# Patient Record
Sex: MALE | Race: WHITE | NOT HISPANIC OR LATINO | Employment: FULL TIME | URBAN - METROPOLITAN AREA
[De-identification: names, ages, dates, MRNs, and addresses within clinical notes are randomized per-mention and may not be internally consistent; named-entity substitution may affect disease eponyms.]

---

## 2017-01-18 ENCOUNTER — TRANSFERRED RECORDS (OUTPATIENT)
Dept: CARDIOLOGY | Facility: CLINIC | Age: 57
End: 2017-01-18

## 2017-06-14 ENCOUNTER — HOSPITAL ENCOUNTER (INPATIENT)
Facility: CLINIC | Age: 57
LOS: 2 days | Discharge: HOME | DRG: 247 | End: 2017-06-16
Attending: EMERGENCY MEDICINE | Admitting: INTERNAL MEDICINE
Payer: COMMERCIAL

## 2017-06-14 ENCOUNTER — APPOINTMENT (OUTPATIENT)
Dept: GENERAL RADIOLOGY | Facility: CLINIC | Age: 57
DRG: 247 | End: 2017-06-14
Attending: EMERGENCY MEDICINE
Payer: COMMERCIAL

## 2017-06-14 DIAGNOSIS — I21.4 NSTEMI (NON-ST ELEVATED MYOCARDIAL INFARCTION) (H): ICD-10-CM

## 2017-06-14 LAB
ALBUMIN SERPL-MCNC: 3.9 G/DL (ref 3.4–5)
ALP SERPL-CCNC: 38 U/L (ref 40–150)
ALT SERPL W P-5'-P-CCNC: 43 U/L (ref 0–70)
ANION GAP SERPL CALCULATED.3IONS-SCNC: 7 MMOL/L (ref 3–14)
AST SERPL W P-5'-P-CCNC: 30 U/L (ref 0–45)
BASOPHILS # BLD AUTO: 0 10E9/L (ref 0–0.2)
BASOPHILS NFR BLD AUTO: 0.3 %
BILIRUB SERPL-MCNC: 0.4 MG/DL (ref 0.2–1.3)
BUN SERPL-MCNC: 19 MG/DL (ref 7–30)
CALCIUM SERPL-MCNC: 9.6 MG/DL (ref 8.5–10.1)
CHLORIDE SERPL-SCNC: 109 MMOL/L (ref 94–109)
CO2 SERPL-SCNC: 25 MMOL/L (ref 20–32)
CREAT SERPL-MCNC: 0.88 MG/DL (ref 0.66–1.25)
D DIMER PPP FEU-MCNC: 0.3 UG/ML FEU (ref 0–0.5)
DIFFERENTIAL METHOD BLD: NORMAL
EOSINOPHIL # BLD AUTO: 0.1 10E9/L (ref 0–0.7)
EOSINOPHIL NFR BLD AUTO: 1.4 %
ERYTHROCYTE [DISTWIDTH] IN BLOOD BY AUTOMATED COUNT: 12.4 % (ref 10–15)
GFR SERPL CREATININE-BSD FRML MDRD: 89 ML/MIN/1.7M2
GLUCOSE SERPL-MCNC: 112 MG/DL (ref 70–99)
HBA1C MFR BLD: 5.8 % (ref 4.3–6)
HCT VFR BLD AUTO: 41.7 % (ref 40–53)
HGB BLD-MCNC: 14.5 G/DL (ref 13.3–17.7)
IMM GRANULOCYTES # BLD: 0 10E9/L (ref 0–0.4)
IMM GRANULOCYTES NFR BLD: 0.2 %
LIPASE SERPL-CCNC: 193 U/L (ref 73–393)
LYMPHOCYTES # BLD AUTO: 2.2 10E9/L (ref 0.8–5.3)
LYMPHOCYTES NFR BLD AUTO: 33.2 %
MAGNESIUM SERPL-MCNC: 1.7 MG/DL (ref 1.6–2.3)
MCH RBC QN AUTO: 30.5 PG (ref 26.5–33)
MCHC RBC AUTO-ENTMCNC: 34.8 G/DL (ref 31.5–36.5)
MCV RBC AUTO: 88 FL (ref 78–100)
MONOCYTES # BLD AUTO: 0.5 10E9/L (ref 0–1.3)
MONOCYTES NFR BLD AUTO: 8.3 %
NEUTROPHILS # BLD AUTO: 3.7 10E9/L (ref 1.6–8.3)
NEUTROPHILS NFR BLD AUTO: 56.6 %
NRBC # BLD AUTO: 0 10*3/UL
NRBC BLD AUTO-RTO: 0 /100
NT-PROBNP SERPL-MCNC: 44 PG/ML (ref 0–900)
PLATELET # BLD AUTO: 231 10E9/L (ref 150–450)
POTASSIUM SERPL-SCNC: 3.7 MMOL/L (ref 3.4–5.3)
PROT SERPL-MCNC: 7.3 G/DL (ref 6.8–8.8)
RBC # BLD AUTO: 4.75 10E12/L (ref 4.4–5.9)
SODIUM SERPL-SCNC: 141 MMOL/L (ref 133–144)
TROPONIN I SERPL-MCNC: 1.48 UG/L (ref 0–0.04)
TROPONIN I SERPL-MCNC: 1.61 UG/L (ref 0–0.04)
WBC # BLD AUTO: 6.5 10E9/L (ref 4–11)

## 2017-06-14 PROCEDURE — 85379 FIBRIN DEGRADATION QUANT: CPT | Performed by: EMERGENCY MEDICINE

## 2017-06-14 PROCEDURE — 84484 ASSAY OF TROPONIN QUANT: CPT | Performed by: EMERGENCY MEDICINE

## 2017-06-14 PROCEDURE — 84484 ASSAY OF TROPONIN QUANT: CPT | Performed by: INTERNAL MEDICINE

## 2017-06-14 PROCEDURE — 25000132 ZZH RX MED GY IP 250 OP 250 PS 637: Performed by: EMERGENCY MEDICINE

## 2017-06-14 PROCEDURE — 25000128 H RX IP 250 OP 636: Performed by: EMERGENCY MEDICINE

## 2017-06-14 PROCEDURE — 36415 COLL VENOUS BLD VENIPUNCTURE: CPT | Performed by: INTERNAL MEDICINE

## 2017-06-14 PROCEDURE — 83735 ASSAY OF MAGNESIUM: CPT | Performed by: EMERGENCY MEDICINE

## 2017-06-14 PROCEDURE — 25000132 ZZH RX MED GY IP 250 OP 250 PS 637: Performed by: INTERNAL MEDICINE

## 2017-06-14 PROCEDURE — 96376 TX/PRO/DX INJ SAME DRUG ADON: CPT

## 2017-06-14 PROCEDURE — 99223 1ST HOSP IP/OBS HIGH 75: CPT | Mod: AI | Performed by: INTERNAL MEDICINE

## 2017-06-14 PROCEDURE — 71020 XR CHEST 2 VW: CPT

## 2017-06-14 PROCEDURE — 83690 ASSAY OF LIPASE: CPT | Performed by: EMERGENCY MEDICINE

## 2017-06-14 PROCEDURE — 83880 ASSAY OF NATRIURETIC PEPTIDE: CPT | Performed by: EMERGENCY MEDICINE

## 2017-06-14 PROCEDURE — 83036 HEMOGLOBIN GLYCOSYLATED A1C: CPT | Performed by: EMERGENCY MEDICINE

## 2017-06-14 PROCEDURE — 96361 HYDRATE IV INFUSION ADD-ON: CPT

## 2017-06-14 PROCEDURE — 12000007 ZZH R&B INTERMEDIATE

## 2017-06-14 PROCEDURE — 96365 THER/PROPH/DIAG IV INF INIT: CPT

## 2017-06-14 PROCEDURE — 99285 EMERGENCY DEPT VISIT HI MDM: CPT | Mod: 25

## 2017-06-14 PROCEDURE — 85025 COMPLETE CBC W/AUTO DIFF WBC: CPT | Performed by: EMERGENCY MEDICINE

## 2017-06-14 PROCEDURE — 93005 ELECTROCARDIOGRAM TRACING: CPT

## 2017-06-14 PROCEDURE — 80053 COMPREHEN METABOLIC PANEL: CPT | Performed by: EMERGENCY MEDICINE

## 2017-06-14 RX ORDER — HYDRALAZINE HYDROCHLORIDE 20 MG/ML
10 INJECTION INTRAMUSCULAR; INTRAVENOUS EVERY 4 HOURS PRN
Status: DISCONTINUED | OUTPATIENT
Start: 2017-06-14 | End: 2017-06-16 | Stop reason: HOSPADM

## 2017-06-14 RX ORDER — BISACODYL 10 MG
10 SUPPOSITORY, RECTAL RECTAL DAILY PRN
Status: DISCONTINUED | OUTPATIENT
Start: 2017-06-14 | End: 2017-06-16 | Stop reason: HOSPADM

## 2017-06-14 RX ORDER — MAGNESIUM SULFATE HEPTAHYDRATE 40 MG/ML
4 INJECTION, SOLUTION INTRAVENOUS EVERY 4 HOURS PRN
Status: DISCONTINUED | OUTPATIENT
Start: 2017-06-14 | End: 2017-06-16 | Stop reason: HOSPADM

## 2017-06-14 RX ORDER — POTASSIUM CL/LIDO/0.9 % NACL 10MEQ/0.1L
10 INTRAVENOUS SOLUTION, PIGGYBACK (ML) INTRAVENOUS
Status: DISCONTINUED | OUTPATIENT
Start: 2017-06-14 | End: 2017-06-16 | Stop reason: HOSPADM

## 2017-06-14 RX ORDER — ONDANSETRON 2 MG/ML
4 INJECTION INTRAMUSCULAR; INTRAVENOUS EVERY 6 HOURS PRN
Status: DISCONTINUED | OUTPATIENT
Start: 2017-06-14 | End: 2017-06-16 | Stop reason: HOSPADM

## 2017-06-14 RX ORDER — NITROGLYCERIN 0.4 MG/1
0.4 TABLET SUBLINGUAL EVERY 5 MIN PRN
Status: DISCONTINUED | OUTPATIENT
Start: 2017-06-14 | End: 2017-06-15

## 2017-06-14 RX ORDER — LISINOPRIL 40 MG/1
40 TABLET ORAL AT BEDTIME
Status: DISCONTINUED | OUTPATIENT
Start: 2017-06-14 | End: 2017-06-16 | Stop reason: HOSPADM

## 2017-06-14 RX ORDER — LORAZEPAM 0.5 MG/1
.5-1 TABLET ORAL EVERY 4 HOURS PRN
Status: DISCONTINUED | OUTPATIENT
Start: 2017-06-14 | End: 2017-06-16 | Stop reason: HOSPADM

## 2017-06-14 RX ORDER — PROCHLORPERAZINE 25 MG
25 SUPPOSITORY, RECTAL RECTAL EVERY 12 HOURS PRN
Status: DISCONTINUED | OUTPATIENT
Start: 2017-06-14 | End: 2017-06-16 | Stop reason: HOSPADM

## 2017-06-14 RX ORDER — CARVEDILOL 6.25 MG/1
6.25 TABLET ORAL ONCE
Status: COMPLETED | OUTPATIENT
Start: 2017-06-14 | End: 2017-06-15

## 2017-06-14 RX ORDER — POTASSIUM CHLORIDE 29.8 MG/ML
20 INJECTION INTRAVENOUS
Status: DISCONTINUED | OUTPATIENT
Start: 2017-06-14 | End: 2017-06-16 | Stop reason: HOSPADM

## 2017-06-14 RX ORDER — LISINOPRIL 20 MG/1
20 TABLET ORAL 2 TIMES DAILY
Status: DISCONTINUED | OUTPATIENT
Start: 2017-06-14 | End: 2017-06-14

## 2017-06-14 RX ORDER — ACETAMINOPHEN 500 MG
1000 TABLET ORAL EVERY 6 HOURS PRN
Status: DISCONTINUED | OUTPATIENT
Start: 2017-06-14 | End: 2017-06-15

## 2017-06-14 RX ORDER — CARVEDILOL 6.25 MG/1
6.25 TABLET ORAL 2 TIMES DAILY WITH MEALS
Status: DISCONTINUED | OUTPATIENT
Start: 2017-06-14 | End: 2017-06-14

## 2017-06-14 RX ORDER — POTASSIUM CHLORIDE 1500 MG/1
20-40 TABLET, EXTENDED RELEASE ORAL
Status: DISCONTINUED | OUTPATIENT
Start: 2017-06-14 | End: 2017-06-16 | Stop reason: HOSPADM

## 2017-06-14 RX ORDER — POTASSIUM CHLORIDE 1.5 G/1.58G
20-40 POWDER, FOR SOLUTION ORAL
Status: DISCONTINUED | OUTPATIENT
Start: 2017-06-14 | End: 2017-06-16 | Stop reason: HOSPADM

## 2017-06-14 RX ORDER — LISINOPRIL 30 MG/1
60 TABLET ORAL AT BEDTIME
Status: ON HOLD | COMMUNITY
End: 2017-06-16

## 2017-06-14 RX ORDER — VENLAFAXINE 37.5 MG/1
37.5 TABLET ORAL 2 TIMES DAILY
Status: DISCONTINUED | OUTPATIENT
Start: 2017-06-14 | End: 2017-06-16 | Stop reason: HOSPADM

## 2017-06-14 RX ORDER — PROCHLORPERAZINE MALEATE 5 MG
5-10 TABLET ORAL EVERY 6 HOURS PRN
Status: DISCONTINUED | OUTPATIENT
Start: 2017-06-14 | End: 2017-06-16 | Stop reason: HOSPADM

## 2017-06-14 RX ORDER — ASPIRIN 325 MG
325 TABLET ORAL ONCE
Status: COMPLETED | OUTPATIENT
Start: 2017-06-14 | End: 2017-06-14

## 2017-06-14 RX ORDER — VENLAFAXINE 37.5 MG/1
37.5 TABLET ORAL 2 TIMES DAILY
COMMUNITY
End: 2024-01-03

## 2017-06-14 RX ORDER — ATORVASTATIN CALCIUM 40 MG/1
40 TABLET, FILM COATED ORAL
Status: DISCONTINUED | OUTPATIENT
Start: 2017-06-14 | End: 2017-06-16 | Stop reason: HOSPADM

## 2017-06-14 RX ORDER — SODIUM CHLORIDE 9 MG/ML
INJECTION, SOLUTION INTRAVENOUS CONTINUOUS
Status: DISCONTINUED | OUTPATIENT
Start: 2017-06-15 | End: 2017-06-16 | Stop reason: HOSPADM

## 2017-06-14 RX ORDER — OMEGA-3 FATTY ACIDS/FISH OIL 300-1000MG
1000 CAPSULE ORAL DAILY
COMMUNITY
End: 2017-08-10

## 2017-06-14 RX ORDER — AMOXICILLIN 250 MG
1-2 CAPSULE ORAL 2 TIMES DAILY PRN
Status: DISCONTINUED | OUTPATIENT
Start: 2017-06-14 | End: 2017-06-16 | Stop reason: HOSPADM

## 2017-06-14 RX ORDER — ONDANSETRON 4 MG/1
4 TABLET, ORALLY DISINTEGRATING ORAL EVERY 6 HOURS PRN
Status: DISCONTINUED | OUTPATIENT
Start: 2017-06-14 | End: 2017-06-16 | Stop reason: HOSPADM

## 2017-06-14 RX ORDER — SODIUM CHLORIDE 9 MG/ML
1000 INJECTION, SOLUTION INTRAVENOUS CONTINUOUS
Status: DISCONTINUED | OUTPATIENT
Start: 2017-06-14 | End: 2017-06-14

## 2017-06-14 RX ORDER — NALOXONE HYDROCHLORIDE 0.4 MG/ML
.1-.4 INJECTION, SOLUTION INTRAMUSCULAR; INTRAVENOUS; SUBCUTANEOUS
Status: DISCONTINUED | OUTPATIENT
Start: 2017-06-14 | End: 2017-06-16

## 2017-06-14 RX ORDER — LISINOPRIL 20 MG/1
20 TABLET ORAL DAILY
Status: DISCONTINUED | OUTPATIENT
Start: 2017-06-15 | End: 2017-06-14

## 2017-06-14 RX ORDER — CARVEDILOL 12.5 MG/1
12.5 TABLET ORAL 2 TIMES DAILY WITH MEALS
Status: DISCONTINUED | OUTPATIENT
Start: 2017-06-15 | End: 2017-06-15

## 2017-06-14 RX ORDER — POTASSIUM CHLORIDE 7.45 MG/ML
10 INJECTION INTRAVENOUS
Status: DISCONTINUED | OUTPATIENT
Start: 2017-06-14 | End: 2017-06-16 | Stop reason: HOSPADM

## 2017-06-14 RX ADMIN — LISINOPRIL 40 MG: 40 TABLET ORAL at 21:53

## 2017-06-14 RX ADMIN — VENLAFAXINE 37.5 MG: 37.5 TABLET ORAL at 21:53

## 2017-06-14 RX ADMIN — ATORVASTATIN CALCIUM 40 MG: 40 TABLET, FILM COATED ORAL at 20:09

## 2017-06-14 RX ADMIN — CARVEDILOL 6.25 MG: 6.25 TABLET, FILM COATED ORAL at 20:09

## 2017-06-14 RX ADMIN — SODIUM CHLORIDE 1000 ML: 9 INJECTION, SOLUTION INTRAVENOUS at 20:50

## 2017-06-14 RX ADMIN — SODIUM CHLORIDE 1000 ML: 9 INJECTION, SOLUTION INTRAVENOUS at 17:45

## 2017-06-14 RX ADMIN — Medication 5250 UNITS: at 18:35

## 2017-06-14 RX ADMIN — NITROGLYCERIN 0.4 MG: 0.4 TABLET SUBLINGUAL at 17:36

## 2017-06-14 RX ADMIN — HEPARIN SODIUM 12 UNITS/KG/HR: 10000 INJECTION, SOLUTION INTRAVENOUS at 18:36

## 2017-06-14 RX ADMIN — ASPIRIN 325 MG ORAL TABLET 325 MG: 325 PILL ORAL at 17:36

## 2017-06-14 RX ADMIN — POTASSIUM CHLORIDE 20 MEQ: 1500 TABLET, EXTENDED RELEASE ORAL at 23:01

## 2017-06-14 ASSESSMENT — ENCOUNTER SYMPTOMS
LIGHT-HEADEDNESS: 0
SHORTNESS OF BREATH: 1
CHEST TIGHTNESS: 1
CONSTIPATION: 0
COUGH: 0
HEMATURIA: 0
NAUSEA: 0
DIARRHEA: 0
PALPITATIONS: 0
VOMITING: 0
DIZZINESS: 0
DIFFICULTY URINATING: 0
FREQUENCY: 0

## 2017-06-14 NOTE — ED NOTES
Pt presents with chest pain, has been going on for about 2 months, Has been getting shortness of breath over the past few months. Now having Substernal chest pain, on exertion. ABC's AXOx4.

## 2017-06-14 NOTE — ED PROVIDER NOTES
History     Chief Complaint:  Chest Pain    HPI   Darryl Uribe is a 57 year old male who presents with chest pain. The patient states the pain is substernal without radiation, and has been intermittent for the last 2 months, since he ran a 5K in April, and doesn't radiate to any other part of the body.  In the past 2-3 days he developed constant chest pressure, 3/10, and shortness of breath, and these three symptoms all worsen with exertion, bringing his pain to a 7-8/10. The chest pressure has been persistent the last few days, and this change is what prompted the patient to call his PCP, who advised him to come to the emergency department for further evaluation. Upon presentation, the patient denies any acute pain, but states the pressure is still felt in his chest. The patient denies nausea, fever, cough, change in bladder and or bowel habits, dizziness, palpatations, leg swelling, and states no other concerns at this time.     Of note, the patient used to workout often before onset of symptoms, but his shortness of breath stopped his activities.     Cardiac/PE/DVT Risk Factors:  The patient has a history of hypertension and high cholesterol and is not a smoker.  He reports a family history of cardiac problems- early-onset MI in both parents.  He denies any personal or familial history of PE, DVT or clotting disorder.  He reports no recent travel, surgery or other immobilizations.  He is not on hormone therapy and has no known malignancy.    Allergies:  No known drug allergies.      Medications:    The patient is currently on no regular medications.      Past Medical History:    History reviewed.  No significant past medical history.     Past Surgical History:    History reviewed. No pertinent past surgical history.     Family History:    History reviewed. No pertinent family history.     Social History:  Marital Status:  Single  Smoking status: Never smoker  Alcohol use: No  Recreational drug use:  Never    Review of Systems   Respiratory: Positive for chest tightness and shortness of breath. Negative for cough.    Cardiovascular: Positive for chest pain. Negative for palpitations and leg swelling.   Gastrointestinal: Negative for constipation, diarrhea, nausea and vomiting.   Genitourinary: Negative for difficulty urinating, frequency, hematuria and urgency.   Neurological: Negative for dizziness and light-headedness.   All other systems reviewed and are negative.      Physical Exam     Patient Vitals for the past 24 hrs:   BP Temp Temp src Pulse Heart Rate Resp SpO2   06/14/17 1711 (!) 150/96 98.2  F (36.8  C) Oral 87 87 18 97 %      Physical Exam  VITAL SIGNS: BP (!) 150/96  Pulse 87  Temp 98.2  F (36.8  C) (Oral)  Resp 18  SpO2 97%   Constitutional:  Well developed, Well nourished.   HENT:  Bilateral external ears normal, Mucous membranes moist, Nose normal. Neck- Normal range of motion, Supple  Respiratory:  Normal breath sounds, No respiratory distress, No wheezing,  Cardiovascular:  Normal heart rate, Normal rhythm, No murmurs, pulses intact and equal bilaterally.  GI:  Bowel sounds normal, Soft, No tenderness,   Musculoskeletal:  Intact distal pulses, No edema, grossly unremarkable range of motion, no calf tenderness  Integument:  Warm, Dry   Neurologic:  Alert, attentive and appropriately oriented  Psychiatric:  Anxious    Emergency Department Course     ECG:  @ 1711  Indication: chest pain   Vent. Rate 83 bpm. RI interval 182 ms. QRS duration 90 ms. QT/QTc 362/425 ms. P-R-T axis 58 -13 -21.   Abnormal ECG.  Normal sinus rhythm, interior infarct, new since 08/13/09  Read @ 1730 by Dr. Perez.     Imaging:  Radiology findings were communicated with the patient who voiced understanding of the findings.  XR Chest 2 vws:  Mistake in not done while in the department, was done on the floor and is unremarkable  Report per radiology      Laboratory:  Laboratory findings were communicated with the patient  who voiced understanding of the findings.  CBC: AWNL. (WBC 6.5, HGB 14.5, )   CMP: Glucose 112 (H), ALKPHOS 38 (L), o/w WNL (Creatinine 0.88)  Lipase: 193  Troponin (Collected 1714): 1614 (H)  Pro-BNP: 44  (1714) D Dimer: 0.3    Interventions:  1736: Nitro 0.4 mg PO  1736: Asprin 325 mg PO  1745: NS 1,000mL IV    1836: Heparin loading bolus 25,000 units in NaCl     Emergency Department Course:  Nursing notes and vitals reviewed.  I performed an exam of the patient as documented above.   IV was inserted and blood was drawn for laboratory testing, results above.  1800: Patient rechecked and updated. Discussed plan to start heparin, and is agreeable to that.  No history of bleeding problems including intracranial or GI hemorrhage.  He is currently pain free after medications.   I discussed the treatment plan with the patient and his partner. They expressed understanding of this plan and consented to admission. I discussed the patient with  Dr. Haro, who will admit the patient to a monitored bed for further evaluation and treatment.    I personally reviewed the laboratory and imaging results with the Patient and answered all related questions prior to admit.      Impression & Plan      Medical Decision Making:  Darryl Uribe is a 57 year old male who presents with chest pain.  His history and risk factor analysis are significant for NSTEMI.  The workup in the Emergency Department (see above for cardiac enzymes and EKG)  is indicative of NSTEMI.  I will admit the patient  to medicine service for further workup.  The patient is pain free after nitroglycerin and aspirin.  After discussing with him the risks and benefits of heparinization and given lack of contraindication to this therapy, heparin bolus and drip were started given suspicion of acute coronary syndrome.      There is no clinical, laboratory, or radiographic evidence of pulmonary embolism, AAA, aortic dissection, pneumonia or pneumothorax.      He agrees to be admitted and all questions were answered.    Diagnosis:    ICD-10-CM    1. NSTEMI (non-ST elevated myocardial infarction) (H) I21.4       Disposition:   Admitted to cardiac telemetry bed under the supervision of Dr. Haro    Scribe Disclosure:  [employer], am serving as a scribe at 5:25 PM on 6/14/2017 to document services personally performed by Arin Perez MD, based on my observations and the provider's statements to me.    6/14/2017   [facility] EMERGENCY DEPARTMENT    Critical care time this patient and agree procedures was 30 minutes       Arin Perez MD  06/14/17 2146

## 2017-06-14 NOTE — IP AVS SNAPSHOT
MRN:[id number]                      After Visit Summary   6/14/2017    Darryl Uribe    MRN: [id number]           Thank you!     Thank you for choosing [facility] for your care. Our goal is always to provide you with excellent care. Hearing back from our patients is one way we can continue to improve our services. Please take a few minutes to complete the written survey that you may receive in the mail after you visit. If you would like to speak to someone directly about your visit please contact Patient Relations at [phone]. Thank you!          Patient Information     Date Of Birth          1960        Designated Caregiver       Most Recent Value    Caregiver    Will someone help with your care after discharge? yes    Name of designated caregiver Severiano    Phone number of caregiver [phone]    Caregiver address [address] [address]      About your hospital stay     You were admitted on:  June 14, 2017 You last received care in the:  [place] [address] [facility]    You were discharged on:  June 16, 2017        Reason for your hospital stay       NSTEMI s/p angiogram                  Who to Call     For medical emergencies, please call 911.  For non-urgent questions about your medical care, please call your primary care provider or clinic, None          Attending Provider     Provider Specialty    Arin Perez MD Emergency Medicine    [facility]       Primary Care Provider Fax #    [facility] [phone]      After Care Instructions     Activity       Your activity upon discharge: activity as tolerated            Diet       Follow this diet upon discharge: Orders Placed This Encounter      Advance Diet as Tolerated: Regular Diet Adult; Low Saturated Fat Na <2400mg Diet                  Follow-up Appointments     Follow-up and recommended labs and tests        Follow up with primary care  provider, [facility], within 7 days   Follow up with cardiology as scheduled  Outpatient cardiac rehab                  Your next 10 appointments already scheduled     Jun 30, 2017  8:10 AM CDT   Return Discharge with ZACHERY Burt CNP   [facility] [place] ([facility])    [address]  [place] [address]   [phone]            Aug 10, 2017  4:45 PM CDT   Return Visit with Mike Tirado MD   [facility] ([facility])    [address]  [place] [address]   [phone]              Further instructions from your care team         Going Home after an Angioplasty or Stent Placement (Cardiac)  ______________________________________________    Patient Name: Darryl Uribe  Date of Procedure: Nuvia 15, 2017    Doctor: Mo    After you go home:    Have an adult stay with you for 24 hours.    Drink plenty of fluids.    You may eat your normal diet, unless your doctor tells you otherwise.    For 24 hours:    Relax and take it easy.    Do NOT smoke.    Do NOT make any important or legal decisions.    Do NOT drive or operate machines at home or at work.    Do NOT drink alcohol.    Remove the Band-Aid after 24 hours. If there is minor oozing, apply another Band-aid and remove it after 12 hours.    For 2 days, do NOT have sex or do any heavy exercise.    Do NOT take a bath, or use a hot tub or pool for at least 3 days. You may shower.        Care of wrist or arm site  It is normal to have soreness at the puncture site and mild tingling in your hand for up to 3 days.    For 2 days, do not use your hand or arm to support your weight (such as rising from a chair) or bend your wrist (such as lifting a garage door).    For 2 days, do not lift more than 5 pounds or exercise your arm (tennis, golf or bowling).    If you start bleeding from the site in your arm:    Sit down and  "press firmly on the site with your fingers for 10 minutes. Call your doctor as soon as you can.    If the bleeding stops, sit still and keep your wrist straight for 2 hours.    Medicines    If you have started taking Plavix or Effient, do not stop taking it until you talk to your heart doctor (cardiologist).      If you have stopped any other medicines, check with your nurse or provider about when to restart them.    Call 911 right away if you have bleeding that is heavy or does not stop.    Call your doctor if:    You have a large or growing hard lump around the site.    The site is red, swollen, hot or tender.    Blood or fluid is draining from the site.    You have chills or a fever greater than 101 F (38 C).    Your leg or arm feels numb or cool.    You have hives, a rash or unusual itching.      [facility] [place]:  [phone] (7 days a week)            Pending Results     No orders found from 6/12/2017 to 6/15/2017.            Statement of Approval     Ordered          06/16/17 1413  I have reviewed and agree with all the recommendations and orders detailed in this document.  EFFECTIVE NOW     Approved and electronically signed by:  Tita Mata MD             Admission Information     Date & Time Provider Department Dept. Phone    6/14/2017 Hai Haro MD [place] [address] [facility] [phone]      Your Vitals Were     Blood Pressure Pulse Temperature Respirations Height Weight    173/82 72 97.9  F (36.6  C) (Oral) 16 1.702 m (5' 7\") 115 kg (253 lb 9.6 oz)    Pulse Oximetry BMI (Body Mass Index)                96% 39.72 kg/m2          MyChart Information     [employer] lets you send messages to your doctor, view your test results, renew your prescriptions, schedule appointments and more. To sign up, go to [email or web address] . Click on \"Log in\" on the left side of the screen, which will take you to the Welcome page. Then click on " "\"Sign up Now\" on the right side of the page.     You will be asked to enter the access code listed below, as well as some personal information. Please follow the directions to create your username and password.     Your access code is: [email or web address]  Expires: 2017  2:26 PM     Your access code will  in 90 days. If you need help or a new code, please call your [place] clinic or [phone].        Care EveryWhere ID     This is your Care EveryWhere ID. This could be used by other organizations to access your [place] medical records  [id number]           Review of your medicines      START taking        Dose / Directions    metoprolol 25 MG tablet   Commonly known as:  LOPRESSOR        Dose:  25 mg   Take 1 tablet (25 mg) by mouth 2 times daily   Quantity:  60 tablet   Refills:  0       ticagrelor 90 MG tablet   Commonly known as:  BRILINTA        Dose:  90 mg   Take 1 tablet (90 mg) by mouth every 12 hours   Quantity:  60 tablet   Refills:  0         CONTINUE these medicines which may have CHANGED, or have new prescriptions. If we are uncertain of the size of tablets/capsules you have at home, strength may be listed as something that might have changed.        Dose / Directions    atorvastatin 40 MG tablet   Commonly known as:  LIPITOR   This may have changed:    - medication strength  - how much to take  - when to take this        Dose:  40 mg   Take 1 tablet (40 mg) by mouth daily   Quantity:  30 tablet   Refills:  0       lisinopril 40 MG tablet   Commonly known as:  PRINIVIL/ZESTRIL   This may have changed:    - medication strength  - how much to take        Dose:  40 mg   Take 1 tablet (40 mg) by mouth At Bedtime   Quantity:  30 tablet   Refills:  0         CONTINUE these medicines which have NOT CHANGED        Dose / Directions    ASPIRIN PO        Dose:  81 mg   Take 81 mg by mouth At Bedtime   Refills:  0       LECITHIN PO        Dose:  1 tablet   Take 1 tablet by mouth daily   Refills:  0       " omega 3 1000 MG Caps        Dose:  1000 mg   Take 1,000 mg by mouth daily   Refills:  0       omeprazole 20 MG CR capsule   Commonly known as:  priLOSEC        Dose:  20 mg   Take 20 mg by mouth 2 times daily   Refills:  0       venlafaxine 37.5 MG tablet   Commonly known as:  EFFEXOR        Dose:  37.5 mg   Take 37.5 mg by mouth 2 times daily   Refills:  0       VITAMIN D (CHOLECALCIFEROL) PO        Dose:  400 Units   Take 400 Units by mouth daily   Refills:  0            Where to get your medicines      These medications were sent to [place], [place] - [address] [address]  [address] [address] [facility] [address]     Phone:  [phone]     atorvastatin 40 MG tablet    lisinopril 40 MG tablet    metoprolol 25 MG tablet    ticagrelor 90 MG tablet                Protect others around you: Learn how to safely use, store and throw away your medicines at www.disposemymeds.org.             Medication List: This is a list of all your medications and when to take them. Check marks below indicate your daily home schedule. Keep this list as a reference.      Medications           Morning Afternoon Evening Bedtime As Needed    ASPIRIN PO   Take 81 mg by mouth At Bedtime   Last time this was given:  81 mg on 6/16/2017  8:52 AM         8am                       atorvastatin 40 MG tablet   Commonly known as:  LIPITOR   Take 1 tablet (40 mg) by mouth daily   Last time this was given:  40 mg on 6/15/2017  7:34 PM         8am                       LECITHIN PO   Take 1 tablet by mouth daily         8am                       lisinopril 40 MG tablet   Commonly known as:  PRINIVIL/ZESTRIL   Take 1 tablet (40 mg) by mouth At Bedtime   Last time this was given:  40 mg on 6/15/2017  9:20 PM                     bedtime           metoprolol 25 MG tablet   Commonly known as:  LOPRESSOR   Take 1 tablet (25 mg) by mouth 2 times daily   Last time this was given:  25 mg on 6/16/2017  8:52 AM         8am         8pm               omega 3 1000 MG Caps   Take 1,000 mg by mouth daily         8am                       omeprazole 20 MG CR capsule   Commonly known as:  priLOSEC   Take 20 mg by mouth 2 times daily   Last time this was given:  20 mg on 6/16/2017  8:52 AM         8am        8pm               ticagrelor 90 MG tablet   Commonly known as:  BRILINTA   Take 1 tablet (90 mg) by mouth every 12 hours   Last time this was given:  90 mg on 6/16/2017  8:52 AM         8am        8pm               venlafaxine 37.5 MG tablet   Commonly known as:  EFFEXOR   Take 37.5 mg by mouth 2 times daily   Last time this was given:  37.5 mg on 6/16/2017  8:52 AM         8am        8pm               VITAMIN D (CHOLECALCIFEROL) PO   Take 400 Units by mouth daily         8am                                 More Information        Discharge Instructions for Heart Attack  You have had a heart attack (acute myocardial infarction). A heart attack occurs when a vessel that sends blood to your heart suddenly becomes blocked. This causes your heart not to work as well as it should. Follow these guidelines for home care and lifestyle changes.  Home care    Take your medicines exactly as directed. Don t skip doses. Talk with your healthcare provider if your medicines aren't working for you. Together you can come up with another treatment plan.    Remember that recovery after a heart attack takes time. Plan to rest for at least 4 to 8 weeks while you recover. Then return to normal activity when your doctor says it s OK.    Ask your doctor about joining a heart rehabilitation program. This can help strengthen your heart and lungs and give you more energy and confidence.    Tell your doctor if you are feeling depressed. Feelings of sadness are common after a heart attack. But it is important to speak to someone or seek counseling if you are feeling overwhelmed by these feelings.    Call 911 right away if you have chest pain or pain that goes to your  shoulder, neck, or back. Don't drive yourself to the hospital.    Ask your family members to learn CPR. This is an important skill that can save lives when it's needed.    Learn to take your own blood pressure and pulse. Keep a record of your results. Ask your doctor when you should seek emergency medical attention. He or she will tell you which blood pressure reading is dangerous.  Lifestyle changes  Your heart attack might have been caused by cardiovascular disease. Your healthcare provider will work with you to make changes to your lifestyle. This will help the heart disease from getting worse. These changes will most likely be a combination of diet and exercise.  Diet  Your healthcare provider will tell you what changes you need to make to your diet. You may need to see a registered dietitian for help with these diet changes. These changes may include:    Cutting back on how much fat and cholesterol you eat    Cutting back on how much salt (sodium) you eat, especially if you have high blood pressure    Eating more fresh vegetables and fruits    Eating lean proteins such as fish, poultry, beans, and peas, and eating less red meat and processed meats    Using low-fat dairy products    Using vegetable and nut oils in limited amounts    Limiting how many sweets and processed foods such as chips, cookies, and baked goods you eat    Limiting how often you eat out. And when you do eat out, making better food choices.    Not eating fried or greasy foods, or foods high in saturated fat  Exercise  Your healthcare provider may tell you to get more exercise if you haven't been physically active. Depending on your case, your provider may recommend that you get moderate to vigorous physical activity for at least 40 minutes each day, and for at least 3 to 4 days each week. A few examples of moderate to vigorous activity include:    Walking at a brisk pace, about 3 to 4 miles per hour    Jogging or running    Swimming or water  aerobics    Hiking    Dancing    Martial arts    Tennis    Riding a bicycle or stationary bike  Other changes  Your healthcare provider may also recommend that you:    Lose weight. If you are overweight or obese, your provider will work with you to lose extra pounds. Making diet changes and getting more exercise can help. A good goal is to lose your 10% of your body weight in one year.    Stop smoking. Sign up for a stop-smoking program to make it more likely for you to quit for good. You can join a stop-smoking support group. Or ask your doctor about nicotine replacement products.    Learn to manage stress. Stress management techniques to help you deal with stress in your home and work life. This will help you feel better emotionally and ease the strain on your heart.  Follow-up  Make a follow-up appointment as directed by our staff.     When to seek medical advice  Call 911 right away if you have:    Chest pain that goes to your neck, jaw, back, or shoulder    Shortness of breath  Otherwise, call your doctor immediately if you have:    Lightheadedness, dizziness, or fainting    Feeling of irregular heartbeat or fast pulse.   Date Last Reviewed: 10/1/2016    8332-0939 [employer]. [address] [place] [address]. All rights reserved. This information is not intended as a substitute for professional medical care. Always follow your healthcare professional's instructions.                Metoprolol tablets  What is this medicine?  METOPROLOL (me TOE proe lole) is a beta-blocker. Beta-blockers reduce the workload on the heart and help it to beat more regularly. This medicine is used to treat high blood pressure and to prevent chest pain. It is also used to after a heart attack and to prevent an additional heart attack from occurring.  How should I use this medicine?  Take this medicine by mouth with a drink of water. Follow the directions on the prescription label. Take this medicine immediately  after meals. Take your doses at regular intervals. Do not take more medicine than directed. Do not stop taking this medicine suddenly. This could lead to serious heart-related effects.  Talk to your pediatrician regarding the use of this medicine in children. Special care may be needed.  What side effects may I notice from receiving this medicine?  Side effects that you should report to your doctor or health care professional as soon as possible:    allergic reactions like skin rash, itching or hives    cold or numb hands or feet    depression    difficulty breathing    faint    fever with sore throat    irregular heartbeat, chest pain    rapid weight gain    swollen legs or ankles  Side effects that usually do not require medical attention (report to your doctor or health care professional if they continue or are bothersome):    anxiety or nervousness    change in sex drive or performance    dry skin    headache    nightmares or trouble sleeping    short term memory loss    stomach upset or diarrhea    unusually tired  What may interact with this medicine?  This medicine may interact with the following medications:    certain medicines for blood pressure, heart disease, irregular heart beat    certain medicines for depression like monoamine oxidase (MAO) inhibitors, fluoxetine, or paroxetine    clonidine    dobutamine    epinephrine    isoproterenol    reserpine  What if I miss a dose?  If you miss a dose, take it as soon as you can. If it is almost time for your next dose, take only that dose. Do not take double or extra doses.  Where should I keep my medicine?  Keep out of the reach of children.  Store at room temperature between 15 and 30 degrees C (59 and 86 degrees F). Throw away any unused medicine after the expiration date.  What should I tell my health care provider before I take this medicine?  They need to know if you have any of these conditions:    diabetes    heart or vessel disease like slow heart  rate, worsening heart failure, heart block, sick sinus syndrome or Raynaud's disease    kidney disease    liver disease    lung or breathing disease, like asthma or emphysema    pheochromocytoma    thyroid disease    an unusual or allergic reaction to metoprolol, other beta-blockers, medicines, foods, dyes, or preservatives    pregnant or trying to get pregnant    breast-feeding  What should I watch for while using this medicine?  Visit your doctor or health care professional for regular check ups. Contact your doctor right away if your symptoms worsen. Check your blood pressure and pulse rate regularly. Ask your health care professional what your blood pressure and pulse rate should be, and when you should contact them.  You may get drowsy or dizzy. Do not drive, use machinery, or do anything that needs mental alertness until you know how this medicine affects you. Do not sit or stand up quickly, especially if you are an older patient. This reduces the risk of dizzy or fainting spells. Contact your doctor if these symptoms continue. Alcohol may interfere with the effect of this medicine. Avoid alcoholic drinks.  NOTE:This sheet is a summary. It may not cover all possible information. If you have questions about this medicine, talk to your doctor, pharmacist, or health care provider. Copyright  2017 Gold Standard                Ticagrelor Oral tablet  What is this medicine?  TICAGRELOR (MAYKEL ka GREL or) helps to prevent blood clots. This medicine is used to prevent heart attack, stroke, or other vascular events in people who have had a recent heart attack or who have severe chest pain.  This medicine may be used for other purposes; ask your health care provider or pharmacist if you have questions.  What should I tell my health care provider before I take this medicine?  They need to know if you have any of these conditions:    bleeding disorder    bleeding in the brain    liver disease    planned surgery    stomach  or intestinal ulcers    stroke or transient ischemic attack    an unusual or allergic reaction to ticagrelor, other medicines, foods, dyes, or preservatives    pregnant or trying to get pregnant    breast-feeding  How should I use this medicine?  Take this medicine by mouth with a glass of water. Follow the directions on the prescription label. You can take it with or without food. If it upsets your stomach, take it with food. Take your medicine at regular intervals. Do not take it more often than directed. Do not stop taking except on your doctor's advice.  Talk to you pediatrician regarding the use of this medicine in children. Special care may be needed.  Overdosage: If you think you've taken too much of this medicine contact a poison control center or emergency room at once.  NOTE: This medicine is only for you. Do not share this medicine with others.  What if I miss a dose?  If you miss a dose, take it as soon as you can. If it is almost time for your next dose, take only that dose. Do not take double or extra doses.  What may interact with this medicine?    certain antibiotics like clarithromycin and telithromycin    certain medicines for fungal infections like itraconazole, ketoconazole, and voriconazole    certain medicines for HIV infection like atazanavir, indinavir, nelfinavir, ritonavir, and saquinavir    certain medicines for seizures like carbamazepine, phenobarbital, and phenytoin    certain medicines that treat or prevent blood clots like warfarin    dexamethasone    digoxin    lovastatin    nefazodone    rifampin    simvastatin  This list may not describe all possible interactions. Give your health care provider a list of all the medicines, herbs, non-prescription drugs, or dietary supplements you use. Also tell them if you smoke, drink alcohol, or use illegal drugs. Some items may interact with your medicine.  What should I watch for while using this medicine?  Visit your doctor or health care  professional for regular check ups. Do not stop taking you medicine unless your doctor tells you to.  Notify your doctor or health care professional and seek emergency treatment if you develop breathing problems; changes in vision; chest pain; severe, sudden headache; pain, swelling, warmth in the leg; trouble speaking; sudden numbness or weakness of the face, arm, or leg. These can be signs that your condition has gotten worse.  If you are going to have surgery or dental work, tell your doctor or health care professional that you are taking this medicine.  You should take aspirin every day with this medicine. Do not take more than 100 mg each day. Talk to your doctor if you have questions.  What side effects may I notice from receiving this medicine?  Side effects that you should report to your doctor or health care professional as soon as possible:    allergic reactions like skin rash, itching or hives, swelling of the face, lips, or tongue    breathing problems    fast or irregular heartbeat    feeling faint or light-headed, falls    signs and symptoms of bleeding such as bloody or black, tarry stools; red or dark-brown urine; spitting up blood or brown material that looks like coffee grounds; red spots on the skin; unusual bruising or bleeding from the eye, gums, or nose  Side effects that usually do not require medical attention (Report these to your doctor or health care professional if they continue or are bothersome.):    breast enlargement in both males and females    diarrhea    dizziness    headache    tiredness    upset stomach  This list may not describe all possible side effects. Call your doctor for medical advice about side effects. You may report side effects to FDA at 7-098-UVW-1488.  Where should I keep my medicine?  Keep out of the reach of children.  Store at room temperature of 59 to 86 degrees F (15 to 30 degrees C). Throw away any unused medicine after the expiration date.  NOTE: This sheet  is a summary. It may not cover all possible information. If you have questions about this medicine, talk to your doctor, pharmacist, or health care provider.  NOTE:This sheet is a summary. It may not cover all possible information. If you have questions about this medicine, talk to your doctor, pharmacist, or health care provider. Copyright  2016 Gold Standard

## 2017-06-14 NOTE — IP AVS SNAPSHOT
"[place] [address] [facility]: [phone]                                              INTERAGENCY TRANSFER FORM - PHYSICIAN ORDERS   2017                    Hospital Admission Date: 2017  ROHITH DALY   : 1960  Sex: Male        Attending Provider: Hai Haro MD     Allergies:  No Known Allergies    Infection:  None   Service:  GENERAL MEDI    Ht:  1.702 m (5' 7\")   Wt:  115 kg (253 lb 9.6 oz)   Admission Wt:  118.4 kg (261 lb 0.4 oz)    BMI:  39.72 kg/m 2   BSA:  2.33 m 2            Patient PCP Information     Provider PCP Type    [facility] General      ED Clinical Impression     Diagnosis Description Comment Added By Time Added    NSTEMI (non-ST elevated myocardial infarction) (H) [I21.4] NSTEMI (non-ST elevated myocardial infarction) (H) [I21.4]  Arin Perez MD 2017  6:13 PM      Hospital Problems as of 2017              Priority Class Noted POA    NSTEMI (non-ST elevated myocardial infarction) (H) Medium  2017 Yes      Non-Hospital Problems as of 2017     None      Code Status History     Date Active Date Inactive Code Status Order ID Comments User Context    2017  9:23 AM 2017  2:11 PM Full Code [id number]  Tita Mata MD Outpatient    2017  7:43 PM 2017  9:23 AM Full Code [id number]  Hai Haro MD Inpatient         Medication Review      START taking        Dose / Directions Comments    metoprolol 25 MG tablet   Commonly known as:  LOPRESSOR        Dose:  25 mg   Take 1 tablet (25 mg) by mouth 2 times daily   Quantity:  60 tablet   Refills:  0        ticagrelor 90 MG tablet   Commonly known as:  BRILINTA        Dose:  90 mg   Take 1 tablet (90 mg) by mouth every 12 hours   Quantity:  60 tablet   Refills:  0          CONTINUE these medications which may have CHANGED, or have new prescriptions. If we are uncertain of the size of tablets/capsules you have at home, strength " may be listed as something that might have changed.        Dose / Directions Comments    atorvastatin 40 MG tablet   Commonly known as:  LIPITOR   This may have changed:    - medication strength  - how much to take  - when to take this        Dose:  40 mg   Take 1 tablet (40 mg) by mouth daily   Quantity:  30 tablet   Refills:  0        lisinopril 40 MG tablet   Commonly known as:  PRINIVIL/ZESTRIL   This may have changed:    - medication strength  - how much to take        Dose:  40 mg   Take 1 tablet (40 mg) by mouth At Bedtime   Quantity:  30 tablet   Refills:  0          CONTINUE these medications which have NOT CHANGED        Dose / Directions Comments    ASPIRIN PO        Dose:  81 mg   Take 81 mg by mouth At Bedtime   Refills:  0        LECITHIN PO        Dose:  1 tablet   Take 1 tablet by mouth daily   Refills:  0        omega 3 1000 MG Caps        Dose:  1000 mg   Take 1,000 mg by mouth daily   Refills:  0        omeprazole 20 MG CR capsule   Commonly known as:  priLOSEC        Dose:  20 mg   Take 20 mg by mouth 2 times daily   Refills:  0        venlafaxine 37.5 MG tablet   Commonly known as:  EFFEXOR        Dose:  37.5 mg   Take 37.5 mg by mouth 2 times daily   Refills:  0        VITAMIN D (CHOLECALCIFEROL) PO        Dose:  400 Units   Take 400 Units by mouth daily   Refills:  0                  Further instructions from your care team         Going Home after an Angioplasty or Stent Placement (Cardiac)  ______________________________________________    Patient Name: Darryl Uribe  Date of Procedure: Nuvia 15, 2017    Doctor: Mo    After you go home:    Have an adult stay with you for 24 hours.    Drink plenty of fluids.    You may eat your normal diet, unless your doctor tells you otherwise.    For 24 hours:    Relax and take it easy.    Do NOT smoke.    Do NOT make any important or legal decisions.    Do NOT drive or operate machines at home or at work.    Do NOT drink alcohol.    Remove the Band-Aid  after 24 hours. If there is minor oozing, apply another Band-aid and remove it after 12 hours.    For 2 days, do NOT have sex or do any heavy exercise.    Do NOT take a bath, or use a hot tub or pool for at least 3 days. You may shower.        Care of wrist or arm site  It is normal to have soreness at the puncture site and mild tingling in your hand for up to 3 days.    For 2 days, do not use your hand or arm to support your weight (such as rising from a chair) or bend your wrist (such as lifting a garage door).    For 2 days, do not lift more than 5 pounds or exercise your arm (tennis, golf or bowling).    If you start bleeding from the site in your arm:    Sit down and press firmly on the site with your fingers for 10 minutes. Call your doctor as soon as you can.    If the bleeding stops, sit still and keep your wrist straight for 2 hours.    Medicines    If you have started taking Plavix or Effient, do not stop taking it until you talk to your heart doctor (cardiologist).      If you have stopped any other medicines, check with your nurse or provider about when to restart them.    Call 911 right away if you have bleeding that is heavy or does not stop.    Call your doctor if:    You have a large or growing hard lump around the site.    The site is red, swollen, hot or tender.    Blood or fluid is draining from the site.    You have chills or a fever greater than 101 F (38 C).    Your leg or arm feels numb or cool.    You have hives, a rash or unusual itching.      [facility] [place]:  [phone] (7 days a week)            Summary of Visit     Reason for your hospital stay       NSTEMI s/p angiogram             After Care     Activity       Your activity upon discharge: activity as tolerated       Diet       Follow this diet upon discharge: Orders Placed This Encounter      Advance Diet as Tolerated: Regular Diet Adult; Low Saturated Fat Na <2400mg Diet             Your next 10  appointments already scheduled     Jun 30, 2017  8:10 AM CDT   Return Discharge with ZACHERY Burt CNP   [facility] [place] ([facility])    [address]  [place] [id number] [phone]            Aug 10, 2017  4:45 PM CDT   Return Visit with Mike Tirado MD   [facility] [place] ([facility])    [address]  [place] [id number] [phone]              Follow-Up Appointment Instructions     Future Labs/Procedures    Follow-up and recommended labs and tests      Comments:    Follow up with primary care provider, [facility], within 7 days   Follow up with cardiology as scheduled      Follow-Up Appointment Instructions     Follow-up and recommended labs and tests        Follow up with primary care provider, [facility], within 7 days   Follow up with cardiology as scheduled             Statement of Approval     Ordered          06/16/17 1413  I have reviewed and agree with all the recommendations and orders detailed in this document.  EFFECTIVE NOW     Approved and electronically signed by:  Tita Mata MD

## 2017-06-14 NOTE — IP AVS SNAPSHOT
[place] [address] [facility]    [address]    [facility] [address]    Phone:  [phone]    Fax:  [phone]                                       After Visit Summary   6/14/2017    Darryl Uribe    MRN: [id number]           After Visit Summary Signature Page     I have received my discharge instructions, and my questions have been answered. I have discussed any challenges I see with this plan with the nurse or doctor.    ..........................................................................................................................................  Patient/Patient Representative Signature      ..........................................................................................................................................  Patient Representative Print Name and Relationship to Patient    ..................................................               ................................................  Date                                            Time    ..........................................................................................................................................  Reviewed by Signature/Title    ...................................................              ..............................................  Date                                                            Time

## 2017-06-14 NOTE — ED NOTES
"[facility]  ED Nurse Handoff Report    Darryl Uribe is a 57 year old male   ED Chief complaint: Chest Pain  . ED Diagnosis:   Final diagnoses:   NSTEMI (non-ST elevated myocardial infarction) (H)     Allergies: No Known Allergies    Code Status: Full Code  Activity level - Baseline/Home:  Independent. Activity Level - Current:   Independent. Lift room needed: No. Bariatric: No   Needed: No   Isolation: No. Infection: Not Applicable.     Vital Signs:   Vitals:    06/14/17 1711   BP: (!) 150/96   Pulse: 87   Resp: 18   Temp: 98.2  F (36.8  C)   TempSrc: Oral   SpO2: 97%   Weight: 118.4 kg (261 lb 0.4 oz)   Height: 1.702 m (5' 7\")       Cardiac Rhythm:  ,   Cardiac  Cardiac Rhythm: Normal sinus rhythm  Pain level:    Patient confused: No. Patient Falls Risk: No.   Elimination Status: Has voided   Patient Report - Initial Complaint: Chest pain. Focused Assessment:  Cardiac - JVD (Jugular Venous Distention): absent General Capillary Refill: less than/equal to 3 secs Cardiac Comment: Chest pain substernal with excertion, rating 7/10 has been short of breath for past 2 months.   Chest Pain Assessment - Rating (0-10): 7 Duration: 2 months  Precipitating Factors: activity Associated Signs/Symptoms: diaphoresis Chest Pain Reproducible?: Yes If yes, how & where is the chest pain reproducible?: When walkinf or going up stairs.   Cardiac Monitoring - EKG Monitoring: Yes Cardiac Regularity: Regular Cardiac Rhythm: NSR  Chest Pain Assessment - Location: midsternal  Cardiac - Cardiac WDL:  WDL except  Tests Performed: labs X-ray. Abnormal Results: Troponin .   Treatments provided: heparin, and asa  Family Comments: Gentleman at beside   OBS brochure/video discussed/provided to patient:  N/A  ED Medications:   Medications   0.9% sodium chloride BOLUS (1,000 mLs Intravenous New Bag 6/14/17 3129)     Followed by   0.9% sodium chloride infusion (not administered)   nitroglycerin (NITROSTAT) sublingual tablet " 0.4 mg (0.4 mg Sublingual Given 6/14/17 1736)   heparin infusion 25,000 units in 0.45% NaCl 250 mL (not administered)   heparin Loading Dose bolus dose from infusion pump 5,250 Units (not administered)   aspirin tablet 325 mg (325 mg Oral Given 6/14/17 1736)     Drips infusing:  Yes heparin          ED Nurse Name/Phone Number: Keegan Rhoades,   6:27 PM    RECEIVING UNIT ED HANDOFF REVIEW    Above ED Nurse Handoff Report was reviewed: Yes  Reviewed by: SULLY KELLER on June 14, 2017 at 7:22 PM

## 2017-06-14 NOTE — H&P
[facility]  Hospitalist Admission Note  Name: Darryl Uribe    MRN: [id number]  YOB: 1960    Age: 57 year old  Date of admission: 6/14/2017  [facility] care provider: [facility]    Chief Complaint:  Chest pain    Darryl Uribe is a 57 year old male with PMH including HTN, anxiety who presents with chest pressure noted to have fairly classic anginal symptoms for over two months which have accelerated in the past three days.  He is now pain free after nitroglycerine.  EKG shows inferior q-waves but no ST elevations or depressions.  Troponin is elevated to 1.614 and he is being admitted for NSTEMI and treated with heparin drip, atorvastatin, aspirin, coreg, lisinopril and close monitoring.  Cardiology will be consulted and he will be NPO at midnight as he likely will need an angiogram while here.    Assessment and Plan:   1. NSTEMI: consider a primary coronary event about two months ago with concerning anginal symptoms intermittently since then, now accelerating which could indicate a new or expanding infarct or alternately complications such as arrhythmias or early CHF symptoms from what I suspect has been ongoing (at least intermittently) ischemia for the past 2+ months.  He is now pain free after nitroglycerine but will continue medical management as noted below and keep him NPO for possible angiogram.  -admit to telemetry  -continue heparin drip  -serial troponins  -check TTE  -Cardiology consult, strong suspicion he'll need an angiogram  -add coreg 6.25 mg BID, atorvastatin 40 mg.  Titrate coreg as needed.  Update.  Still hypertensive to 150s systolic.  Will increase coreg to 12.5 mg BID.  -resume lisinopril.  Will reduce from home 30 mg to 20 mg for now until we see how he responds to the coreg.  -repeat EKG if any chest pain returns.  -monitor on tele  -check A1C, lipid panel    2.   Hypertension: adding coreg 6.25 mg BID, titrate as able.  Continue home lisinopril at  reduced dose of 40 mg QHS compared w/ home 60 mg QHS while titrating coreg.    3.   Anxiety: resume effexor.    4.  Obesity    FEN: NPO at midnight, add low rate IVF in the morning at 0600 to avoid hypovolemia in case cath is planned.    DVT Prophylaxis: heparin drip  Code Status: Full Code  Discharge Dispo: admit to inpatient status      History of Present Illness:  Darryl Uribe is a 57 year old male with PMH including HTN, anxiety who presents with chest pain.  He has been having intermittent chest pain for about two months.  He ran a [employer] in April and notes he actually had chest pressure and dyspnea prior to that which was severe and that the race itself was very difficult for him and he felt he could not catch his breath and had chest pressure and generally a very difficult time recovering.  His chest discomfort has been pressure-like with associated dyspnea, worse with exertion and he denies radiation anywhere or arm symptoms.  He felt this was more persistent the past few days with progressively worsened tolerance for basic physical activity such as going up a flight of stairs so he called his PCP and was sent to the ER today at which point he was noted to still have chest pressure.  He was given a nitroglycerine then his pain completely resolved.  He had an EKG here suggestive of an inferior infarct (q-waves in inferior leads) and troponin which was elevated to 1.614.  D-dimer was negative.  He was given aspirin 325 mg, fluids and started on a heparin drip.        Past Medical History:  Ulcerative colitis  HTN  Anxiety on effexor    Past Surgical History:  Ileostomy surgery 7-8 years ago  Carpal tunnel  rhinoplasty    Social History:  Never smoker.  Rare etoh use, most weeks none.  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  No other  "known hx of CVA or CAD.    Allergies:  No Known Allergies  Medications:  None     Lisinopril 30 mg  Atorvastatin 10 mg   Effexor  ASA 81 mg    Review of Systems:  A Comprehensive greater than 10 system review of systems was carried out.  Pertinent positives and negatives are noted above.  Otherwise negative for contributory information.     Physical Exam:  Blood pressure 152/86, pulse 87, temperature 98.2  F (36.8  C), temperature source Oral, resp. rate 18, height 1.702 m (5' 7\"), weight 118.4 kg (261 lb 0.4 oz), SpO2 97 %.  Wt Readings from Last 1 Encounters:   06/14/17 118.4 kg (261 lb 0.4 oz)     Exam:  General: Alert, awake, no acute distress.  HEENT: NC/AT, eyes anicteric, external occular movements intact, face symmetric.  Dentition WNL, MM moist.  Cardiac: RRR, S1, S2.  No murmurs appreciated.  Pulmonary: Normal chest rise, normal work of breathing.  Lungs CTA BL  Abdomen: soft, non-tender, non-distended.  Bowel Sounds Present.  No guarding.  Extremities: no deformities.  Warm, well perfused.  Skin: no rashes or lesions noted.  Warm and Dry.  Neuro: No focal deficits noted.  Speech clear.  Coordination and strength grossly normal.  Psych: Appropriate affect.    Data:  EKG:  NSR with q-waves in leads II and III.  No ST elevations or depressions noted.  Imaging:  Results for orders placed or performed in visit on 08/14/09   CHEST X-RAY 1 VW    Impression       CHEST 1VIEW PORTABLE PICC      HISTORY:  RN placed PICC. Verify tip placement.      FINDINGS: A PICC line has been advanced from the right brachium. The  catheter tip extends to the low superior vena cava possibly, to the  upper right atrium. The catheter could be pulled back 2 or 3 cm. No  pneumothorax.     Labs:    Recent Labs  Lab 06/14/17  1714   WBC 6.5   HGB 14.5   HCT 41.7   MCV 88             Lab Results   Component Value Date     06/14/2017     08/24/2009     08/23/2009    Lab Results   Component Value Date    CHLORIDE " 109 06/14/2017    CHLORIDE 98 08/24/2009    CHLORIDE 96 08/22/2009    Lab Results   Component Value Date    BUN 19 06/14/2017    BUN 7 08/22/2009    BUN 6 08/20/2009      Lab Results   Component Value Date    POTASSIUM 3.7 06/14/2017    POTASSIUM 3.9 08/24/2009    POTASSIUM 3.5 08/22/2009    Lab Results   Component Value Date    CO2 25 06/14/2017    CO2 30 08/24/2009    CO2 28 08/22/2009    Lab Results   Component Value Date    CR 0.88 06/14/2017    CR 0.66 08/22/2009    CR 0.53 08/20/2009            Jc Haro MD  Hospitalist  [facility]

## 2017-06-15 ENCOUNTER — APPOINTMENT (OUTPATIENT)
Dept: CARDIOLOGY | Facility: CLINIC | Age: 57
DRG: 247 | End: 2017-06-15
Attending: INTERNAL MEDICINE
Payer: COMMERCIAL

## 2017-06-15 LAB
CHOLEST SERPL-MCNC: 148 MG/DL
HDLC SERPL-MCNC: 48 MG/DL
INR PPP: 1.05 (ref 0.86–1.14)
INTERPRETATION ECG - MUSE: NORMAL
KCT BLD-ACNC: 275 SEC (ref 105–167)
LDLC SERPL CALC-MCNC: 58 MG/DL
LMWH PPP CHRO-ACNC: 0.23 IU/ML
LMWH PPP CHRO-ACNC: 0.26 IU/ML
NONHDLC SERPL-MCNC: 100 MG/DL
POTASSIUM SERPL-SCNC: 4.1 MMOL/L (ref 3.4–5.3)
TRIGL SERPL-MCNC: 212 MG/DL
TROPONIN I SERPL-MCNC: 1.28 UG/L (ref 0–0.04)

## 2017-06-15 PROCEDURE — 40000275 ZZH STATISTIC RCP TIME EA 10 MIN

## 2017-06-15 PROCEDURE — C1894 INTRO/SHEATH, NON-LASER: HCPCS

## 2017-06-15 PROCEDURE — C9600 PERC DRUG-EL COR STENT SING: HCPCS | Mod: RC

## 2017-06-15 PROCEDURE — B2111ZZ FLUOROSCOPY OF MULTIPLE CORONARY ARTERIES USING LOW OSMOLAR CONTRAST: ICD-10-PCS | Performed by: INTERNAL MEDICINE

## 2017-06-15 PROCEDURE — 93306 TTE W/DOPPLER COMPLETE: CPT | Mod: 26 | Performed by: INTERNAL MEDICINE

## 2017-06-15 PROCEDURE — C1769 GUIDE WIRE: HCPCS

## 2017-06-15 PROCEDURE — 85347 COAGULATION TIME ACTIVATED: CPT

## 2017-06-15 PROCEDURE — C1874 STENT, COATED/COV W/DEL SYS: HCPCS

## 2017-06-15 PROCEDURE — 25000128 H RX IP 250 OP 636: Performed by: INTERNAL MEDICINE

## 2017-06-15 PROCEDURE — 99233 SBSQ HOSP IP/OBS HIGH 50: CPT | Performed by: INTERNAL MEDICINE

## 2017-06-15 PROCEDURE — 25500064 ZZH RX 255 OP 636: Performed by: INTERNAL MEDICINE

## 2017-06-15 PROCEDURE — 27210759 ZZH DEVICE INFLATION CR6

## 2017-06-15 PROCEDURE — 27210856 ZZH ACCESS HEART CATH CR2

## 2017-06-15 PROCEDURE — 93454 CORONARY ARTERY ANGIO S&I: CPT | Mod: 26 | Performed by: INTERNAL MEDICINE

## 2017-06-15 PROCEDURE — C1725 CATH, TRANSLUMIN NON-LASER: HCPCS

## 2017-06-15 PROCEDURE — 36415 COLL VENOUS BLD VENIPUNCTURE: CPT | Performed by: INTERNAL MEDICINE

## 2017-06-15 PROCEDURE — 027034Z DILATION OF CORONARY ARTERY, ONE ARTERY WITH DRUG-ELUTING INTRALUMINAL DEVICE, PERCUTANEOUS APPROACH: ICD-10-PCS | Performed by: INTERNAL MEDICINE

## 2017-06-15 PROCEDURE — 85520 HEPARIN ASSAY: CPT | Performed by: INTERNAL MEDICINE

## 2017-06-15 PROCEDURE — 92928 PRQ TCAT PLMT NTRAC ST 1 LES: CPT | Mod: RC | Performed by: INTERNAL MEDICINE

## 2017-06-15 PROCEDURE — 99152 MOD SED SAME PHYS/QHP 5/>YRS: CPT

## 2017-06-15 PROCEDURE — 12000007 ZZH R&B INTERMEDIATE

## 2017-06-15 PROCEDURE — 40000264 ECHO COMPLETE WITH LUMASON

## 2017-06-15 PROCEDURE — 27210827 ZZH KIT ACIST INJECTOR CR6

## 2017-06-15 PROCEDURE — 27210946 ZZH KIT HC TOTES DISP CR8

## 2017-06-15 PROCEDURE — 27210742 ZZH CATH CR1

## 2017-06-15 PROCEDURE — 25000125 ZZHC RX 250: Performed by: INTERNAL MEDICINE

## 2017-06-15 PROCEDURE — 93454 CORONARY ARTERY ANGIO S&I: CPT

## 2017-06-15 PROCEDURE — 84132 ASSAY OF SERUM POTASSIUM: CPT | Performed by: INTERNAL MEDICINE

## 2017-06-15 PROCEDURE — 93010 ELECTROCARDIOGRAM REPORT: CPT | Performed by: INTERNAL MEDICINE

## 2017-06-15 PROCEDURE — 99152 MOD SED SAME PHYS/QHP 5/>YRS: CPT | Performed by: INTERNAL MEDICINE

## 2017-06-15 PROCEDURE — 99153 MOD SED SAME PHYS/QHP EA: CPT

## 2017-06-15 PROCEDURE — 25000132 ZZH RX MED GY IP 250 OP 250 PS 637: Performed by: INTERNAL MEDICINE

## 2017-06-15 PROCEDURE — 93005 ELECTROCARDIOGRAM TRACING: CPT

## 2017-06-15 PROCEDURE — C1887 CATHETER, GUIDING: HCPCS

## 2017-06-15 PROCEDURE — 85610 PROTHROMBIN TIME: CPT | Performed by: INTERNAL MEDICINE

## 2017-06-15 PROCEDURE — 80061 LIPID PANEL: CPT | Performed by: INTERNAL MEDICINE

## 2017-06-15 PROCEDURE — 99222 1ST HOSP IP/OBS MODERATE 55: CPT | Mod: 25 | Performed by: INTERNAL MEDICINE

## 2017-06-15 PROCEDURE — 99153 MOD SED SAME PHYS/QHP EA: CPT | Performed by: INTERNAL MEDICINE

## 2017-06-15 PROCEDURE — 84484 ASSAY OF TROPONIN QUANT: CPT | Performed by: INTERNAL MEDICINE

## 2017-06-15 RX ORDER — HYDRALAZINE HYDROCHLORIDE 20 MG/ML
10-20 INJECTION INTRAMUSCULAR; INTRAVENOUS
Status: DISCONTINUED | OUTPATIENT
Start: 2017-06-15 | End: 2017-06-15 | Stop reason: HOSPADM

## 2017-06-15 RX ORDER — SODIUM CHLORIDE 9 MG/ML
INJECTION, SOLUTION INTRAVENOUS CONTINUOUS
Status: ACTIVE | OUTPATIENT
Start: 2017-06-15 | End: 2017-06-15

## 2017-06-15 RX ORDER — PROTAMINE SULFATE 10 MG/ML
1-5 INJECTION, SOLUTION INTRAVENOUS
Status: DISCONTINUED | OUTPATIENT
Start: 2017-06-15 | End: 2017-06-15 | Stop reason: HOSPADM

## 2017-06-15 RX ORDER — FLUMAZENIL 0.1 MG/ML
0.2 INJECTION, SOLUTION INTRAVENOUS
Status: DISCONTINUED | OUTPATIENT
Start: 2017-06-15 | End: 2017-06-15

## 2017-06-15 RX ORDER — EPTIFIBATIDE 2 MG/ML
2 INJECTION, SOLUTION INTRAVENOUS CONTINUOUS PRN
Status: DISCONTINUED | OUTPATIENT
Start: 2017-06-15 | End: 2017-06-15 | Stop reason: HOSPADM

## 2017-06-15 RX ORDER — POTASSIUM CHLORIDE 29.8 MG/ML
20 INJECTION INTRAVENOUS
Status: DISCONTINUED | OUTPATIENT
Start: 2017-06-15 | End: 2017-06-15 | Stop reason: HOSPADM

## 2017-06-15 RX ORDER — NALOXONE HYDROCHLORIDE 0.4 MG/ML
.1-.4 INJECTION, SOLUTION INTRAMUSCULAR; INTRAVENOUS; SUBCUTANEOUS
Status: DISCONTINUED | OUTPATIENT
Start: 2017-06-15 | End: 2017-06-16 | Stop reason: HOSPADM

## 2017-06-15 RX ORDER — NALOXONE HYDROCHLORIDE 0.4 MG/ML
.2-.4 INJECTION, SOLUTION INTRAMUSCULAR; INTRAVENOUS; SUBCUTANEOUS
Status: ACTIVE | OUTPATIENT
Start: 2017-06-15 | End: 2017-06-16

## 2017-06-15 RX ORDER — NITROGLYCERIN 0.4 MG/1
0.4 TABLET SUBLINGUAL EVERY 5 MIN PRN
Status: DISCONTINUED | OUTPATIENT
Start: 2017-06-15 | End: 2017-06-16 | Stop reason: HOSPADM

## 2017-06-15 RX ORDER — NITROGLYCERIN 20 MG/100ML
.07-1.71 INJECTION INTRAVENOUS CONTINUOUS PRN
Status: DISCONTINUED | OUTPATIENT
Start: 2017-06-15 | End: 2017-06-15 | Stop reason: HOSPADM

## 2017-06-15 RX ORDER — PROTAMINE SULFATE 10 MG/ML
25-100 INJECTION, SOLUTION INTRAVENOUS EVERY 5 MIN PRN
Status: DISCONTINUED | OUTPATIENT
Start: 2017-06-15 | End: 2017-06-15 | Stop reason: HOSPADM

## 2017-06-15 RX ORDER — METOPROLOL TARTRATE 1 MG/ML
5 INJECTION, SOLUTION INTRAVENOUS EVERY 5 MIN PRN
Status: DISCONTINUED | OUTPATIENT
Start: 2017-06-15 | End: 2017-06-15 | Stop reason: HOSPADM

## 2017-06-15 RX ORDER — NALOXONE HYDROCHLORIDE 0.4 MG/ML
.1-.4 INJECTION, SOLUTION INTRAMUSCULAR; INTRAVENOUS; SUBCUTANEOUS
Status: DISCONTINUED | OUTPATIENT
Start: 2017-06-15 | End: 2017-06-16

## 2017-06-15 RX ORDER — ASPIRIN 325 MG
325 TABLET ORAL
Status: DISCONTINUED | OUTPATIENT
Start: 2017-06-15 | End: 2017-06-15 | Stop reason: HOSPADM

## 2017-06-15 RX ORDER — PHENYLEPHRINE HCL IN 0.9% NACL 1 MG/10 ML
20-100 SYRINGE (ML) INTRAVENOUS
Status: DISCONTINUED | OUTPATIENT
Start: 2017-06-15 | End: 2017-06-15 | Stop reason: HOSPADM

## 2017-06-15 RX ORDER — LORAZEPAM 0.5 MG/1
0.5 TABLET ORAL
Status: DISCONTINUED | OUTPATIENT
Start: 2017-06-15 | End: 2017-06-15 | Stop reason: HOSPADM

## 2017-06-15 RX ORDER — LIDOCAINE 40 MG/G
CREAM TOPICAL
Status: DISCONTINUED | OUTPATIENT
Start: 2017-06-15 | End: 2017-06-16 | Stop reason: HOSPADM

## 2017-06-15 RX ORDER — FLUMAZENIL 0.1 MG/ML
0.2 INJECTION, SOLUTION INTRAVENOUS
Status: DISCONTINUED | OUTPATIENT
Start: 2017-06-15 | End: 2017-06-15 | Stop reason: HOSPADM

## 2017-06-15 RX ORDER — ENALAPRILAT 1.25 MG/ML
1.25-2.5 INJECTION INTRAVENOUS
Status: DISCONTINUED | OUTPATIENT
Start: 2017-06-15 | End: 2017-06-15 | Stop reason: HOSPADM

## 2017-06-15 RX ORDER — NITROGLYCERIN 5 MG/ML
100-500 VIAL (ML) INTRAVENOUS
Status: COMPLETED | OUTPATIENT
Start: 2017-06-15 | End: 2017-06-15

## 2017-06-15 RX ORDER — DOPAMINE HYDROCHLORIDE 160 MG/100ML
2-20 INJECTION, SOLUTION INTRAVENOUS CONTINUOUS PRN
Status: DISCONTINUED | OUTPATIENT
Start: 2017-06-15 | End: 2017-06-15 | Stop reason: HOSPADM

## 2017-06-15 RX ORDER — LIDOCAINE 40 MG/G
CREAM TOPICAL
Status: DISCONTINUED | OUTPATIENT
Start: 2017-06-15 | End: 2017-06-15 | Stop reason: HOSPADM

## 2017-06-15 RX ORDER — NIFEDIPINE 10 MG/1
10 CAPSULE ORAL
Status: DISCONTINUED | OUTPATIENT
Start: 2017-06-15 | End: 2017-06-15 | Stop reason: HOSPADM

## 2017-06-15 RX ORDER — LIDOCAINE HYDROCHLORIDE 10 MG/ML
1-10 INJECTION, SOLUTION EPIDURAL; INFILTRATION; INTRACAUDAL; PERINEURAL
Status: DISCONTINUED | OUTPATIENT
Start: 2017-06-15 | End: 2017-06-15 | Stop reason: HOSPADM

## 2017-06-15 RX ORDER — NALOXONE HYDROCHLORIDE 0.4 MG/ML
0.4 INJECTION, SOLUTION INTRAMUSCULAR; INTRAVENOUS; SUBCUTANEOUS EVERY 5 MIN PRN
Status: DISCONTINUED | OUTPATIENT
Start: 2017-06-15 | End: 2017-06-15 | Stop reason: HOSPADM

## 2017-06-15 RX ORDER — NITROGLYCERIN 0.4 MG/1
0.4 TABLET SUBLINGUAL EVERY 5 MIN PRN
Status: DISCONTINUED | OUTPATIENT
Start: 2017-06-15 | End: 2017-06-15 | Stop reason: HOSPADM

## 2017-06-15 RX ORDER — IOPAMIDOL 755 MG/ML
100 INJECTION, SOLUTION INTRAVASCULAR ONCE
Status: COMPLETED | OUTPATIENT
Start: 2017-06-15 | End: 2017-06-15

## 2017-06-15 RX ORDER — SODIUM CHLORIDE 9 MG/ML
INJECTION, SOLUTION INTRAVENOUS CONTINUOUS
Status: DISCONTINUED | OUTPATIENT
Start: 2017-06-15 | End: 2017-06-15 | Stop reason: HOSPADM

## 2017-06-15 RX ORDER — CLOPIDOGREL BISULFATE 75 MG/1
300-600 TABLET ORAL
Status: DISCONTINUED | OUTPATIENT
Start: 2017-06-15 | End: 2017-06-15 | Stop reason: HOSPADM

## 2017-06-15 RX ORDER — MORPHINE SULFATE 2 MG/ML
1-2 INJECTION, SOLUTION INTRAMUSCULAR; INTRAVENOUS EVERY 5 MIN PRN
Status: DISCONTINUED | OUTPATIENT
Start: 2017-06-15 | End: 2017-06-15 | Stop reason: HOSPADM

## 2017-06-15 RX ORDER — ASPIRIN 81 MG/1
81-324 TABLET, CHEWABLE ORAL
Status: DISCONTINUED | OUTPATIENT
Start: 2017-06-15 | End: 2017-06-15 | Stop reason: HOSPADM

## 2017-06-15 RX ORDER — POTASSIUM CHLORIDE 1500 MG/1
20 TABLET, EXTENDED RELEASE ORAL
Status: DISCONTINUED | OUTPATIENT
Start: 2017-06-15 | End: 2017-06-15 | Stop reason: HOSPADM

## 2017-06-15 RX ORDER — EPTIFIBATIDE 2 MG/ML
180 INJECTION, SOLUTION INTRAVENOUS EVERY 10 MIN PRN
Status: DISCONTINUED | OUTPATIENT
Start: 2017-06-15 | End: 2017-06-15 | Stop reason: HOSPADM

## 2017-06-15 RX ORDER — NALOXONE HYDROCHLORIDE 0.4 MG/ML
.1-.4 INJECTION, SOLUTION INTRAMUSCULAR; INTRAVENOUS; SUBCUTANEOUS
Status: DISCONTINUED | OUTPATIENT
Start: 2017-06-15 | End: 2017-06-15

## 2017-06-15 RX ORDER — BUPIVACAINE HYDROCHLORIDE 2.5 MG/ML
1-10 INJECTION, SOLUTION EPIDURAL; INFILTRATION; INTRACAUDAL
Status: DISCONTINUED | OUTPATIENT
Start: 2017-06-15 | End: 2017-06-15 | Stop reason: HOSPADM

## 2017-06-15 RX ORDER — LORAZEPAM 2 MG/ML
.5-2 INJECTION INTRAMUSCULAR EVERY 4 HOURS PRN
Status: DISCONTINUED | OUTPATIENT
Start: 2017-06-15 | End: 2017-06-15 | Stop reason: HOSPADM

## 2017-06-15 RX ORDER — POTASSIUM CHLORIDE 7.45 MG/ML
10 INJECTION INTRAVENOUS
Status: DISCONTINUED | OUTPATIENT
Start: 2017-06-15 | End: 2017-06-15 | Stop reason: HOSPADM

## 2017-06-15 RX ORDER — ACETAMINOPHEN 325 MG/1
325-650 TABLET ORAL EVERY 4 HOURS PRN
Status: DISCONTINUED | OUTPATIENT
Start: 2017-06-15 | End: 2017-06-16 | Stop reason: HOSPADM

## 2017-06-15 RX ORDER — LORAZEPAM 2 MG/ML
0.5 INJECTION INTRAMUSCULAR
Status: DISCONTINUED | OUTPATIENT
Start: 2017-06-15 | End: 2017-06-15 | Stop reason: HOSPADM

## 2017-06-15 RX ORDER — FUROSEMIDE 10 MG/ML
20-100 INJECTION INTRAMUSCULAR; INTRAVENOUS
Status: DISCONTINUED | OUTPATIENT
Start: 2017-06-15 | End: 2017-06-15 | Stop reason: HOSPADM

## 2017-06-15 RX ORDER — FENTANYL CITRATE 50 UG/ML
INJECTION, SOLUTION INTRAMUSCULAR; INTRAVENOUS
Status: DISCONTINUED
Start: 2017-06-15 | End: 2017-06-15 | Stop reason: HOSPADM

## 2017-06-15 RX ORDER — FLUMAZENIL 0.1 MG/ML
0.2 INJECTION, SOLUTION INTRAVENOUS
Status: ACTIVE | OUTPATIENT
Start: 2017-06-15 | End: 2017-06-16

## 2017-06-15 RX ORDER — HEPARIN SODIUM 1000 [USP'U]/ML
1000-10000 INJECTION, SOLUTION INTRAVENOUS; SUBCUTANEOUS EVERY 5 MIN PRN
Status: DISCONTINUED | OUTPATIENT
Start: 2017-06-15 | End: 2017-06-15 | Stop reason: HOSPADM

## 2017-06-15 RX ORDER — ASPIRIN 81 MG/1
81 TABLET ORAL DAILY
Status: DISCONTINUED | OUTPATIENT
Start: 2017-06-15 | End: 2017-06-16 | Stop reason: HOSPADM

## 2017-06-15 RX ORDER — ADENOSINE 3 MG/ML
12-12000 INJECTION, SOLUTION INTRAVENOUS
Status: DISCONTINUED | OUTPATIENT
Start: 2017-06-15 | End: 2017-06-15 | Stop reason: HOSPADM

## 2017-06-15 RX ORDER — NITROGLYCERIN 5 MG/ML
100-200 VIAL (ML) INTRAVENOUS
Status: DISCONTINUED | OUTPATIENT
Start: 2017-06-15 | End: 2017-06-15 | Stop reason: HOSPADM

## 2017-06-15 RX ORDER — SODIUM NITROPRUSSIDE 25 MG/ML
100-200 INJECTION INTRAVENOUS
Status: DISCONTINUED | OUTPATIENT
Start: 2017-06-15 | End: 2017-06-15 | Stop reason: HOSPADM

## 2017-06-15 RX ORDER — METHYLPREDNISOLONE SODIUM SUCCINATE 125 MG/2ML
125 INJECTION, POWDER, LYOPHILIZED, FOR SOLUTION INTRAMUSCULAR; INTRAVENOUS
Status: DISCONTINUED | OUTPATIENT
Start: 2017-06-15 | End: 2017-06-15 | Stop reason: HOSPADM

## 2017-06-15 RX ORDER — METOPROLOL TARTRATE 25 MG/1
25 TABLET, FILM COATED ORAL 2 TIMES DAILY
Status: DISCONTINUED | OUTPATIENT
Start: 2017-06-15 | End: 2017-06-16 | Stop reason: HOSPADM

## 2017-06-15 RX ORDER — FENTANYL CITRATE 50 UG/ML
25-50 INJECTION, SOLUTION INTRAMUSCULAR; INTRAVENOUS
Status: DISCONTINUED | OUTPATIENT
Start: 2017-06-15 | End: 2017-06-15 | Stop reason: HOSPADM

## 2017-06-15 RX ORDER — VERAPAMIL HYDROCHLORIDE 2.5 MG/ML
INJECTION, SOLUTION INTRAVENOUS
Status: DISCONTINUED
Start: 2017-06-15 | End: 2017-06-15 | Stop reason: HOSPADM

## 2017-06-15 RX ORDER — LIDOCAINE HYDROCHLORIDE 10 MG/ML
30 INJECTION, SOLUTION EPIDURAL; INFILTRATION; INTRACAUDAL; PERINEURAL
Status: DISCONTINUED | OUTPATIENT
Start: 2017-06-15 | End: 2017-06-15 | Stop reason: HOSPADM

## 2017-06-15 RX ORDER — DIPHENHYDRAMINE HYDROCHLORIDE 50 MG/ML
25-50 INJECTION INTRAMUSCULAR; INTRAVENOUS
Status: DISCONTINUED | OUTPATIENT
Start: 2017-06-15 | End: 2017-06-15 | Stop reason: HOSPADM

## 2017-06-15 RX ORDER — ATROPINE SULFATE 0.1 MG/ML
.5-1 INJECTION INTRAVENOUS
Status: DISCONTINUED | OUTPATIENT
Start: 2017-06-15 | End: 2017-06-15 | Stop reason: HOSPADM

## 2017-06-15 RX ORDER — VERAPAMIL HYDROCHLORIDE 2.5 MG/ML
1-2.5 INJECTION, SOLUTION INTRAVENOUS
Status: DISCONTINUED | OUTPATIENT
Start: 2017-06-15 | End: 2017-06-15 | Stop reason: CLARIF

## 2017-06-15 RX ORDER — PROMETHAZINE HYDROCHLORIDE 25 MG/ML
6.25-25 INJECTION, SOLUTION INTRAMUSCULAR; INTRAVENOUS EVERY 4 HOURS PRN
Status: DISCONTINUED | OUTPATIENT
Start: 2017-06-15 | End: 2017-06-15 | Stop reason: HOSPADM

## 2017-06-15 RX ORDER — ATROPINE SULFATE 0.1 MG/ML
0.5 INJECTION INTRAVENOUS EVERY 5 MIN PRN
Status: ACTIVE | OUTPATIENT
Start: 2017-06-15 | End: 2017-06-16

## 2017-06-15 RX ORDER — NICARDIPINE HYDROCHLORIDE 2.5 MG/ML
100 INJECTION INTRAVENOUS
Status: DISCONTINUED | OUTPATIENT
Start: 2017-06-15 | End: 2017-06-15 | Stop reason: HOSPADM

## 2017-06-15 RX ORDER — CLOPIDOGREL BISULFATE 75 MG/1
75 TABLET ORAL
Status: DISCONTINUED | OUTPATIENT
Start: 2017-06-15 | End: 2017-06-15 | Stop reason: HOSPADM

## 2017-06-15 RX ORDER — PRASUGREL 10 MG/1
10-60 TABLET, FILM COATED ORAL
Status: DISCONTINUED | OUTPATIENT
Start: 2017-06-15 | End: 2017-06-15 | Stop reason: HOSPADM

## 2017-06-15 RX ORDER — FENTANYL CITRATE 50 UG/ML
25-50 INJECTION, SOLUTION INTRAMUSCULAR; INTRAVENOUS
Status: ACTIVE | OUTPATIENT
Start: 2017-06-15 | End: 2017-06-16

## 2017-06-15 RX ORDER — DOBUTAMINE HYDROCHLORIDE 200 MG/100ML
2-20 INJECTION INTRAVENOUS CONTINUOUS PRN
Status: DISCONTINUED | OUTPATIENT
Start: 2017-06-15 | End: 2017-06-15 | Stop reason: HOSPADM

## 2017-06-15 RX ORDER — HYDROCODONE BITARTRATE AND ACETAMINOPHEN 5; 325 MG/1; MG/1
1-2 TABLET ORAL EVERY 4 HOURS PRN
Status: DISCONTINUED | OUTPATIENT
Start: 2017-06-15 | End: 2017-06-16 | Stop reason: HOSPADM

## 2017-06-15 RX ORDER — ONDANSETRON 2 MG/ML
4 INJECTION INTRAMUSCULAR; INTRAVENOUS EVERY 4 HOURS PRN
Status: DISCONTINUED | OUTPATIENT
Start: 2017-06-15 | End: 2017-06-15 | Stop reason: HOSPADM

## 2017-06-15 RX ORDER — HEPARIN SODIUM 1000 [USP'U]/ML
INJECTION, SOLUTION INTRAVENOUS; SUBCUTANEOUS
Status: DISCONTINUED
Start: 2017-06-15 | End: 2017-06-15 | Stop reason: HOSPADM

## 2017-06-15 RX ORDER — DEXTROSE MONOHYDRATE 25 G/50ML
12.5-5 INJECTION, SOLUTION INTRAVENOUS EVERY 30 MIN PRN
Status: DISCONTINUED | OUTPATIENT
Start: 2017-06-15 | End: 2017-06-15 | Stop reason: HOSPADM

## 2017-06-15 RX ADMIN — ATORVASTATIN CALCIUM 40 MG: 40 TABLET, FILM COATED ORAL at 19:34

## 2017-06-15 RX ADMIN — SULFUR HEXAFLUORIDE 5 ML: KIT at 08:35

## 2017-06-15 RX ADMIN — HEPARIN SODIUM 1050 UNITS/HR: 10000 INJECTION, SOLUTION INTRAVENOUS at 14:29

## 2017-06-15 RX ADMIN — CARVEDILOL 12.5 MG: 12.5 TABLET, FILM COATED ORAL at 09:14

## 2017-06-15 RX ADMIN — MIDAZOLAM 2 MG: 1 INJECTION INTRAMUSCULAR; INTRAVENOUS at 15:09

## 2017-06-15 RX ADMIN — VENLAFAXINE 37.5 MG: 37.5 TABLET ORAL at 09:14

## 2017-06-15 RX ADMIN — FENTANYL CITRATE 100 MCG: 50 INJECTION INTRAMUSCULAR; INTRAVENOUS at 15:09

## 2017-06-15 RX ADMIN — SODIUM CHLORIDE: 9 INJECTION, SOLUTION INTRAVENOUS at 05:43

## 2017-06-15 RX ADMIN — OMEPRAZOLE 20 MG: 20 CAPSULE, DELAYED RELEASE ORAL at 21:20

## 2017-06-15 RX ADMIN — IOPAMIDOL 127 ML: 755 INJECTION, SOLUTION INTRAVASCULAR at 14:30

## 2017-06-15 RX ADMIN — TICAGRELOR 180 MG: 90 TABLET ORAL at 15:17

## 2017-06-15 RX ADMIN — SODIUM CHLORIDE: 9 INJECTION, SOLUTION INTRAVENOUS at 17:33

## 2017-06-15 RX ADMIN — ACETAMINOPHEN 1000 MG: 500 TABLET, FILM COATED ORAL at 04:57

## 2017-06-15 RX ADMIN — VERAPAMIL HYDROCHLORIDE 2.5 MG: 2.5 INJECTION, SOLUTION INTRAVENOUS at 14:52

## 2017-06-15 RX ADMIN — HEPARIN SODIUM 6000 UNITS: 1000 INJECTION, SOLUTION INTRAVENOUS; SUBCUTANEOUS at 15:17

## 2017-06-15 RX ADMIN — TICAGRELOR 90 MG: 90 TABLET ORAL at 19:35

## 2017-06-15 RX ADMIN — HEPARIN SODIUM 5000 UNITS: 1000 INJECTION, SOLUTION INTRAVENOUS; SUBCUTANEOUS at 15:10

## 2017-06-15 RX ADMIN — ACETAMINOPHEN 1000 MG: 500 TABLET, FILM COATED ORAL at 10:19

## 2017-06-15 RX ADMIN — CARVEDILOL 6.25 MG: 6.25 TABLET, FILM COATED ORAL at 00:37

## 2017-06-15 RX ADMIN — VENLAFAXINE 37.5 MG: 37.5 TABLET ORAL at 21:21

## 2017-06-15 RX ADMIN — METOPROLOL TARTRATE 25 MG: 25 TABLET ORAL at 21:20

## 2017-06-15 RX ADMIN — NITROGLYCERIN 1000 MCG: 10 INJECTION INTRAVENOUS at 14:52

## 2017-06-15 RX ADMIN — ASPIRIN 325 MG: 325 TABLET, DELAYED RELEASE ORAL at 00:37

## 2017-06-15 RX ADMIN — LISINOPRIL 40 MG: 40 TABLET ORAL at 21:20

## 2017-06-15 RX ADMIN — ASPIRIN 325 MG: 325 TABLET, DELAYED RELEASE ORAL at 12:21

## 2017-06-15 RX ADMIN — ACETAMINOPHEN 1000 MG: 500 TABLET, FILM COATED ORAL at 16:51

## 2017-06-15 NOTE — PLAN OF CARE
Problem: Cardiac: Acute Coronary Syndrome (ACS) (Adult)  Goal: Signs and Symptoms of Listed Potential Problems Will be Absent or Manageable (Cardiac: Acute Coronary Syndrome)  Signs and symptoms of listed potential problems will be absent or manageable by discharge/transition of care (reference Cardiac: Acute Coronary Syndrome (ACS) (Adult) CPG).   A&O, VSS, denies chest pain, started on Heparin @10.5ml/hr, Coreg and Atorvastatin, education provided with new medication, Cardio consulted, NPO at midnight for possible Angio tomorrow, Troponin trending down at 1.483, Tele SR, will continue with POC.

## 2017-06-15 NOTE — H&P
Darryl Uribe is a 57 year old male with PMH including HTN, anxiety who presents with chest pressure noted to have fairly classic anginal symptoms for over two months which have accelerated in the past three days.  He is now pain free after nitroglycerine.  EKG shows inferior q-waves but no ST elevations or depressions.  Troponin is elevated to 1.614 and he is being admitted for NSTEMI and treated with heparin drip, atorvastatin, aspirin, coreg, lisinopril and close monitoring.    The visual ejection fraction is estimated at 60-65%.  The right ventricle is normal in size and function.  Mild aortic root dilatation.  The ascending aorta is Mildly dilated.  The rhythm was sinus bradycardia.  No regional wall motion abnormalities noted.    Classic [place] with a non localized SEMI.   LVEF was normal by echo despite the small Q waves inferiorly,  + treated HTN  + treated hyperlipidemia  + FH for CAD (mom and dad)  + over weight    Recommended coronary angiogram.   -  I have reviewed the catheterization procedures including risks and benefits.   This could include angiography, angioplasty, stenting, and other coronary interventions as the  Interventional cardiologist deems necessary.  Risks were discussed but may not be limited to death, heart attack, bleeding, kidney injury, stroke, urgent coronary bypass surgery and vascular injury.   HALINA Tiardo

## 2017-06-15 NOTE — PHARMACY-ADMISSION MEDICATION HISTORY
Added:  Omeprazole  Prior to Admission medications    Medication Sig Last Dose Taking? Auth Provider   ASPIRIN PO Take 81 mg by mouth At Bedtime 6/13/2017 at Unknown time Yes Unknown, Entered By History   ATORVASTATIN CALCIUM PO Take 10 mg by mouth At Bedtime 6/13/2017 at Unknown time Yes Unknown, Entered By History   lisinopril (PRINIVIL,ZESTRIL) 30 MG tablet Take 60 mg by mouth At Bedtime 6/13/2017 at Unknown time Yes Unknown, Entered By History   venlafaxine (EFFEXOR) 37.5 MG tablet Take 37.5 mg by mouth 2 times daily 6/14/2017 at am Yes Unknown, Entered By History   VITAMIN D, CHOLECALCIFEROL, PO Take 400 Units by mouth daily Past Week at Unknown time Yes Unknown, Entered By History   LECITHIN PO Take 1 tablet by mouth daily Past Week at Unknown time Yes Unknown, Entered By History   omega 3 1000 MG CAPS Take 1,000 mg by mouth daily Past Week at Unknown time Yes Unknown, Entered By History   omeprazole (PRILOSEC) 20 MG CR capsule Take 20 mg by mouth 2 times daily  at x Yes Unknown, Entered By History

## 2017-06-15 NOTE — PROCEDURES
Procedure  1) CAG  2) PCI proximal RCA 3.5 x 16 everolimus eluting PROMUS PREMIERE post dilated to 4.0    Findings  LMCA, LAD, CX nondominant, no significant stenosis  RCA focal 95% proximal  40% mid    Post procedure  RCA no residual stenosis . DEANNA 3 flow.    [place]

## 2017-06-15 NOTE — CONSULTS
I have examined the patient, reviewed the history, medications and pre procedural tests. He has sustained a NSTEMI.. I have explained to the patient the risks of death, MI, stroke, hematoma, possible urgent bypass surgery for failed PCI, use of stents, thienopyridine agents, possible peripheral vascular complications, arrhythmia, the use of FFR in clinical decision-making and alternative of medical therapy alone in regards to left heart catheterization, left ventriculography, coronary angiography, and possible percutaneous coronary intervention. The patient voiced understanding and wishes to proceed. The patient has a good right radial pulse, normal ulnar pulse and a normal Rudi's sign.

## 2017-06-15 NOTE — PROGRESS NOTES
[facility]    Hospitalist Progress Note  Name: Darryl Uribe    MRN: [id number]  Provider: Wilda Esquivel MD  Date of Service: 06/15/2017    Assessment & Plan   Summary of Stay: Darryl Uribe is a 57 year old male who was admitted on 6/14/2017. Patient with  PMH sig for HTN, presented with cp, EKG with inferior q waves and elevated troponin. Admitted for NSTEMI    NSTEMI  -- IV Heparin  -- prn nitro  -- asa, BB, ACEI, statins  -- cards consulted likely angio  -- admit to tele  -- Echo ordered  -- HGBa1c 5.8  -- lipid panel: HDL 48, LDL 58, TRIG 212      HTN  --on lisinopril at 40   -- started coreg    Anxiety  -- On Effexor      DVT Prophylaxis: Heparin SQ  Code Status: Full Code    Disposition: Expected discharge in 1-2 days to home if stable after angio      Interval History   Patient denies chest pain at this time, awaiting angio today. HR is lower this morning started on BB    -Data reviewed today: I reviewed all new labs and imaging reports over the last 24 hours. I personally reviewed the EKG tracing showing q in inferior leads.    Physical Exam   Temp: 96.9  F (36.1  C) Temp src: Oral BP: 131/75 Pulse: 95 Heart Rate: 79 Resp: 16 SpO2: 96 % O2 Device: None (Room air)    Vitals:    06/14/17 1711 06/15/17 0453   Weight: 118.4 kg (261 lb 0.4 oz) 116.9 kg (257 lb 12.8 oz)     Vital Signs with Ranges  Temp:  [96.7  F (35.9  C)-98.7  F (37.1  C)] 96.9  F (36.1  C)  Pulse:  [87-95] 95  Heart Rate:  [66-87] 79  Resp:  [16-18] 16  BP: (131-162)/(75-96) 131/75  SpO2:  [94 %-98 %] 96 %  I/O last 3 completed shifts:  In: 1441 [P.O.:480; I.V.:961]  Out: 650 [Stool:650]      GEN:  Alert, oriented x 3, appears comfortable, NAD.  HEENT:  Normocephalic/atraumatic, no scleral icterus, no nasal discharge, mouth moist.  CV:  Regular rate and rhythm, no murmur or JVD.  S1 + S2 noted, no S3 or S4.  LUNGS:  Clear to auscultation bilaterally without rales/rhonchi/wheezing/retractions.  Symmetric chest rise on inhalation  noted.  ABD:  Active bowel sounds, soft, non-tender/non-distended.  No rebound/guarding/rigidity.  EXT:  No edema.  No cyanosis.  No joint synovitis noted.  SKIN:  Dry to touch, no exanthems noted in the visualized areas.    Medications     HEParin 12 Units/kg/hr (06/15/17 0157)     - MEDICATION INSTRUCTIONS -       NaCl 75 mL/hr at 06/15/17 0543       aspirin EC  325 mg Oral Daily     atorvastatin  40 mg Oral Daily at 8 pm     venlafaxine  37.5 mg Oral BID     lisinopril  40 mg Oral At Bedtime     carvedilol  12.5 mg Oral BID w/meals     Data       Recent Labs  Lab 06/14/17  1714   WBC 6.5   HGB 14.5   HCT 41.7   MCV 88          Recent Labs  Lab 06/14/17  1714      POTASSIUM 3.7   CHLORIDE 109   CO2 25   ANIONGAP 7   *   BUN 19   CR 0.88   GFRESTIMATED 89   GFRESTBLACK >90African American GFR Calc   DYANA 9.6       Recent Labs  Lab 06/14/17  1714   NTBNPI 44       Recent Labs  Lab 06/14/17  1714   AST 30   ALT 43   ALKPHOS 38*   BILITOTAL 0.4       Recent Labs  Lab 06/15/17  0620   INR 1.05       Recent Labs  Lab 06/15/17  0620 06/14/17  1956 06/14/17  1714   TROPI 1.284* 1.483* 1.614*       Recent Results (from the past 24 hour(s))   XR Chest 2 Views    Narrative    XR CHEST 2 VW 6/14/2017 8:30 PM     HISTORY: chest pain      Impression    IMPRESSION: Negative exam.    ROBI JASMINE MD

## 2017-06-15 NOTE — CONSULTS
CARDIOLOGY CONSULTATION      HISTORY OF PRESENT ILLNESS:  We were asked by Dr. Haro to see Darryl Uribe because of progressive unstable angina.  Mr. Kumar has not had a previous history of coronary disease.  He has had a tendency to be overweight, hypertensive and hyperlipidemic, the latter to issues of which have been treated nicely.  He says for a couple months, he has had pressure in the mid chest and upper chest, nonradiating, brought on by exertion and relieved by rest.  As time has gotten closer and closer to admission, he has also become more short of breath, had less endurance and his chest  pressure has become more easily provokable.  His symptoms have almost always been on exertion, but progressively more easily provokable.  He finally had prolonged enough and severe enough chest tightness over the past 3 days that he was admitted yesterday, 06/14/2017.  His troponins were trace positive at 1.5.  They have tapered down to 1.4 and 1.3.  His EKG showed sinus rhythm and small Q-waves in III and aVF suggestive of a previous inferior MI, but no ST-T changes or hyperacute changes were appreciated.  Since admission, he has been placed on heparin.  He has been relatively normotensive.  He has been asymptomatic.      He has not had palpitations, dizziness or syncope.  On admission, electrolytes had been normal.  Potassium was 3.9-3.7, creatinine 0.88, BUN 19, hemoglobin 14.5 grams, total cholesterol 148, LDL 58 and HDL 49.      RISK FACTORS FOR CORONARY DISEASE:   1.  Mildly overweight.   2.  Hypertension, treated.   3.  Hyperlipidemia, treated.   1.  Positive family history.  Both mother and father had coronary disease, mother in her 60s and father at an older age.      SOCIAL HISTORY:  He is .  He has no children.  He is a manager.  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  He exercises regularly, mostly walking outside.  Occasionally, he will try to jog lightly, but when he has done so  recently, he got tight in the upper chest/angina.      FAMILY HISTORY:  As noted above, positive for mother and father.      PAST MEDICAL HISTORY:   1.  Hypertension.   2.  Hyperlipidemia.   3.  Ulcerative colitis with colectomy and subsequently an ileostomy.   4.  History of mild anxiety.      ALLERGIES:  None.      MEDICATIONS:   1.  Lisinopril 30 mg a day.   2.  Atorvastatin 10 mg at bedtime.   3.  Effexor daily.   4.  Aspirin 81 mg a day.      REVIEW OF SYSTEMS:   HEENT:  Negative.   CARDIAC:  As noted above.   RESPIRATORY:  Positive for dyspnea on exertion, likely related to his coronary disease.    UPPER GASTROINTESTINAL:  Negative.   LOWER GASTROINTESTINAL:  Positive for an ileostomy that is stable.   GENITOURINARY:  Negative.   The remaining review is negative.      PHYSICAL EXAMINATION:   GENERAL:  Well-developed, well-nourished, mildly overweight male who weighs 260 pounds.   VITAL SIGNS:  Blood pressure is 140/90.  Heart rate is 60 beats a minute.   HEAD:  Normal.   NECK:  Free of neck vein distention, mass or bruit.   HEART:  Regular without gallop, murmur, or rub.   LUNGS:  Clear.   ABDOMEN:  Soft, overweight and without pain or mass.  He has an ileostomy in the mid/right abdomen.   EXTREMITIES:  Free of edema.  Posterior tibial pulse is +2.  No edema or cyanosis is noted.        Darryl Uribe is 57.  He has classic angina and trace subendocardial nonlocalized myocardial infarction.  He most certainly has coronary artery disease.  Coronary angiography is recommended.  His blood pressure is normal.  He is asymptomatic at rest.  I have reviewed coronary angiography, including the benefits of directed therapy, including stenting, bypass only if life-threatening coronary disease present, medication therapy, etc.      I reviewed the risks, including death, myocardial infarction, stroke, urgent surgery, bleeding, dye reactions, vascular injury and kidney injury, among others.  He understands and is willing to  proceed.      IMPRESSION:   1.  Classic unstable angina.   2.  Trace nonlocalized subendocardial myocardial infarction.   3.  Coronary artery disease, severity unclear.   4.  Treated hypertension.   5.  Treated hyperlipidemia.  LDL is at goal.   6.  Mildly overweight.   7.  Positive family history for CAD.   8.  Post-colectomy and ileostomy in place.      PLAN:  Coronary angiography direct therapy, likely coronary stenting as able.         CIRA SQUIRES MD, [facility]             D: 06/15/2017 10:23   T: 06/15/2017 10:55   MT: [id number]      Name:     ROHITH DALY   MRN:      [id number]        Account:       [id number]   :      1960           Consult Date:  06/15/2017      Document: [id number]       cc: [facility]        Hai Haro MD

## 2017-06-15 NOTE — PHARMACY-ADMISSION MEDICATION HISTORY
Admission medication history interview status for this patient is complete. See [facility] admission navigator for allergy information, prior to admission medications and immunization status.     Medication history interview source(s):Patient  Medication history resources (including written lists, pill bottles, clinic record):Medication list  Primary pharmacy:[facility]     Changes made to PTA medication list:  Added: All   Deleted: None   Changed: None    Actions taken by pharmacist (provider contacted, etc):None     Additional medication history information:None    Medication reconciliation/reorder completed by provider prior to medication history? No    Do you take OTC medications (eg tylenol, ibuprofen, fish oil, eye/ear drops, etc)? Yes    For patients on insulin therapy: No       Prior to Admission medications    Medication Sig Last Dose Taking? Auth Provider   ASPIRIN PO Take 81 mg by mouth At Bedtime 6/13/2017 at Unknown time Yes Unknown, Entered By History   ATORVASTATIN CALCIUM PO Take 10 mg by mouth At Bedtime 6/13/2017 at Unknown time Yes Unknown, Entered By History   lisinopril (PRINIVIL,ZESTRIL) 30 MG tablet Take 60 mg by mouth At Bedtime 6/13/2017 at Unknown time Yes Unknown, Entered By History   venlafaxine (EFFEXOR) 37.5 MG tablet Take 37.5 mg by mouth 2 times daily 6/14/2017 at am Yes Unknown, Entered By History   VITAMIN D, CHOLECALCIFEROL, PO Take 400 Units by mouth daily Past Week at Unknown time Yes Unknown, Entered By History   LECITHIN PO Take 1 tablet by mouth daily Past Week at Unknown time Yes Unknown, Entered By History   omega 3 1000 MG CAPS Take 1,000 mg by mouth daily Past Week at Unknown time Yes Unknown, Entered By History

## 2017-06-15 NOTE — PLAN OF CARE
Problem: Cardiac: Acute Coronary Syndrome (ACS) (Adult)  Intervention: Monitor/Manage Fluid Balance  A&Ox4, denies CP, SOB. VSS, afebrile, on room air. Tele SR HR 60s. Lung sounds clear. Heparin gtt infusing at 10.5 mL/hr. Trops trending down. NS at 75 mL/hr to start 0600. NPO, plan for possible Angio today. Asprin 325 mg given early this shift. Card consult in place. Sleeping in between cares, Up SBA. Will cont with POC.

## 2017-06-16 ENCOUNTER — APPOINTMENT (OUTPATIENT)
Dept: PHYSICAL THERAPY | Facility: CLINIC | Age: 57
DRG: 247 | End: 2017-06-16
Attending: INTERNAL MEDICINE
Payer: COMMERCIAL

## 2017-06-16 VITALS
OXYGEN SATURATION: 96 % | HEART RATE: 72 BPM | DIASTOLIC BLOOD PRESSURE: 82 MMHG | BODY MASS INDEX: 39.8 KG/M2 | WEIGHT: 253.6 LBS | TEMPERATURE: 97.9 F | RESPIRATION RATE: 16 BRPM | SYSTOLIC BLOOD PRESSURE: 173 MMHG | HEIGHT: 67 IN

## 2017-06-16 LAB
ANION GAP SERPL CALCULATED.3IONS-SCNC: 6 MMOL/L (ref 3–14)
BASOPHILS # BLD AUTO: 0 10E9/L (ref 0–0.2)
BASOPHILS NFR BLD AUTO: 0.2 %
BUN SERPL-MCNC: 11 MG/DL (ref 7–30)
CALCIUM SERPL-MCNC: 9 MG/DL (ref 8.5–10.1)
CHLORIDE SERPL-SCNC: 106 MMOL/L (ref 94–109)
CO2 SERPL-SCNC: 28 MMOL/L (ref 20–32)
CREAT SERPL-MCNC: 0.86 MG/DL (ref 0.66–1.25)
DIFFERENTIAL METHOD BLD: ABNORMAL
EOSINOPHIL # BLD AUTO: 0.1 10E9/L (ref 0–0.7)
EOSINOPHIL NFR BLD AUTO: 1.4 %
ERYTHROCYTE [DISTWIDTH] IN BLOOD BY AUTOMATED COUNT: 12.1 % (ref 10–15)
GFR SERPL CREATININE-BSD FRML MDRD: ABNORMAL ML/MIN/1.7M2
GLUCOSE SERPL-MCNC: 104 MG/DL (ref 70–99)
HCT VFR BLD AUTO: 39.4 % (ref 40–53)
HGB BLD-MCNC: 13.7 G/DL (ref 13.3–17.7)
IMM GRANULOCYTES # BLD: 0 10E9/L (ref 0–0.4)
IMM GRANULOCYTES NFR BLD: 0.3 %
INTERPRETATION ECG - MUSE: NORMAL
LMWH PPP CHRO-ACNC: NORMAL IU/ML
LYMPHOCYTES # BLD AUTO: 1.3 10E9/L (ref 0.8–5.3)
LYMPHOCYTES NFR BLD AUTO: 22.6 %
MAGNESIUM SERPL-MCNC: 2.1 MG/DL (ref 1.6–2.3)
MCH RBC QN AUTO: 30.6 PG (ref 26.5–33)
MCHC RBC AUTO-ENTMCNC: 34.8 G/DL (ref 31.5–36.5)
MCV RBC AUTO: 88 FL (ref 78–100)
MONOCYTES # BLD AUTO: 0.3 10E9/L (ref 0–1.3)
MONOCYTES NFR BLD AUTO: 5.7 %
NEUTROPHILS # BLD AUTO: 4.1 10E9/L (ref 1.6–8.3)
NEUTROPHILS NFR BLD AUTO: 69.8 %
NRBC # BLD AUTO: 0 10*3/UL
NRBC BLD AUTO-RTO: 0 /100
PLATELET # BLD AUTO: 199 10E9/L (ref 150–450)
POTASSIUM SERPL-SCNC: 3.8 MMOL/L (ref 3.4–5.3)
RBC # BLD AUTO: 4.48 10E12/L (ref 4.4–5.9)
SODIUM SERPL-SCNC: 140 MMOL/L (ref 133–144)
WBC # BLD AUTO: 5.8 10E9/L (ref 4–11)

## 2017-06-16 PROCEDURE — 25000132 ZZH RX MED GY IP 250 OP 250 PS 637: Performed by: INTERNAL MEDICINE

## 2017-06-16 PROCEDURE — 85027 COMPLETE CBC AUTOMATED: CPT | Performed by: INTERNAL MEDICINE

## 2017-06-16 PROCEDURE — 99233 SBSQ HOSP IP/OBS HIGH 50: CPT | Performed by: INTERNAL MEDICINE

## 2017-06-16 PROCEDURE — 83735 ASSAY OF MAGNESIUM: CPT | Performed by: INTERNAL MEDICINE

## 2017-06-16 PROCEDURE — 36415 COLL VENOUS BLD VENIPUNCTURE: CPT | Performed by: INTERNAL MEDICINE

## 2017-06-16 PROCEDURE — 80048 BASIC METABOLIC PNL TOTAL CA: CPT | Performed by: INTERNAL MEDICINE

## 2017-06-16 PROCEDURE — 85520 HEPARIN ASSAY: CPT | Performed by: INTERNAL MEDICINE

## 2017-06-16 PROCEDURE — 99239 HOSP IP/OBS DSCHRG MGMT >30: CPT | Mod: 25 | Performed by: INTERNAL MEDICINE

## 2017-06-16 PROCEDURE — 97161 PT EVAL LOW COMPLEX 20 MIN: CPT | Mod: GP | Performed by: PHYSICAL THERAPIST

## 2017-06-16 PROCEDURE — 40000193 ZZH STATISTIC PT WARD VISIT: Performed by: PHYSICAL THERAPIST

## 2017-06-16 PROCEDURE — 97110 THERAPEUTIC EXERCISES: CPT | Mod: GP | Performed by: PHYSICAL THERAPIST

## 2017-06-16 PROCEDURE — 85025 COMPLETE CBC W/AUTO DIFF WBC: CPT | Performed by: INTERNAL MEDICINE

## 2017-06-16 RX ORDER — POTASSIUM CHLORIDE 1500 MG/1
20 TABLET, EXTENDED RELEASE ORAL
Status: DISCONTINUED | OUTPATIENT
Start: 2017-06-16 | End: 2017-06-16 | Stop reason: CLARIF

## 2017-06-16 RX ORDER — SODIUM CHLORIDE 9 MG/ML
INJECTION, SOLUTION INTRAVENOUS CONTINUOUS
Status: DISCONTINUED | OUTPATIENT
Start: 2017-06-16 | End: 2017-06-16 | Stop reason: CLARIF

## 2017-06-16 RX ORDER — LISINOPRIL 40 MG/1
40 TABLET ORAL AT BEDTIME
Qty: 30 TABLET | Refills: 0 | Status: SHIPPED | OUTPATIENT
Start: 2017-06-16 | End: 2020-10-08

## 2017-06-16 RX ORDER — LIDOCAINE 40 MG/G
CREAM TOPICAL
Status: DISCONTINUED | OUTPATIENT
Start: 2017-06-16 | End: 2017-06-16 | Stop reason: CLARIF

## 2017-06-16 RX ORDER — METOPROLOL TARTRATE 25 MG/1
25 TABLET, FILM COATED ORAL 2 TIMES DAILY
Qty: 60 TABLET | Refills: 0 | Status: SHIPPED | OUTPATIENT
Start: 2017-06-16 | End: 2018-10-02

## 2017-06-16 RX ORDER — LORAZEPAM 2 MG/ML
0.5 INJECTION INTRAMUSCULAR
Status: DISCONTINUED | OUTPATIENT
Start: 2017-06-16 | End: 2017-06-16 | Stop reason: CLARIF

## 2017-06-16 RX ORDER — LORAZEPAM 0.5 MG/1
0.5 TABLET ORAL
Status: DISCONTINUED | OUTPATIENT
Start: 2017-06-16 | End: 2017-06-16 | Stop reason: CLARIF

## 2017-06-16 RX ORDER — ATORVASTATIN CALCIUM 40 MG/1
40 TABLET, FILM COATED ORAL DAILY
Qty: 30 TABLET | Refills: 0 | Status: SHIPPED | OUTPATIENT
Start: 2017-06-16 | End: 2020-10-08

## 2017-06-16 RX ADMIN — OMEPRAZOLE 20 MG: 20 CAPSULE, DELAYED RELEASE ORAL at 08:52

## 2017-06-16 RX ADMIN — ACETAMINOPHEN 650 MG: 325 TABLET, FILM COATED ORAL at 07:16

## 2017-06-16 RX ADMIN — ASPIRIN 81 MG: 81 TABLET, COATED ORAL at 08:52

## 2017-06-16 RX ADMIN — TICAGRELOR 90 MG: 90 TABLET ORAL at 08:52

## 2017-06-16 RX ADMIN — VENLAFAXINE 37.5 MG: 37.5 TABLET ORAL at 08:52

## 2017-06-16 RX ADMIN — METOPROLOL TARTRATE 25 MG: 25 TABLET ORAL at 08:52

## 2017-06-16 NOTE — PLAN OF CARE
"Problem: Goal Outcome Summary  Goal: Goal Outcome Summary     PT/CR:  Eval and treatment complete. Pt functions independently at baseline, lives with spouse in a multi-level home.  Works a desk job.     Discharge Planner PT   Patient plan for discharge: home today  Current status: Pt reports he feels \"great\" today following stenting yesterday, asymptomatic at rest.  Good functional balance, fast gait speed.  Amb indep in hallways x 5 min, mild SOB reported, no angina or dizziness.  Resting Vitals:  /93, HR 69, SaO2 97%.  Vitals with activity: /82, HR 72, SaO2 96%.  Educated pt on post stenting cardiac precautions and activity guidelines, ed on OP phase II CR, orders in chart.  Barriers to return to prior living situation: none  Recommendations for discharge: home with OP CR  Rationale for recommendations: safe for d/c home today, CR orders in chart       Entered by: Angelito Childs 06/16/2017 2:41 PM      Received orders for Smoking Cessation, although pt reports he does not smoke, orders completed.     Physical Therapy Discharge Summary     Reason for therapy discharge:    Discharged to home with outpatient therapy.  All goals and outcomes met, no further needs identified.     Progress towards therapy goal(s). See goals on Care Plan in [facility] electronic health record for goal details.  Goals met     Therapy recommendation(s):    Continue home exercise program.                "

## 2017-06-16 NOTE — DISCHARGE INSTRUCTIONS
Going Home after an Angioplasty or Stent Placement (Cardiac)  ______________________________________________    Patient Name: Darryl Uribe  Date of Procedure: Nuvia 15, 2017    Doctor: Mo    After you go home:    Have an adult stay with you for 24 hours.    Drink plenty of fluids.    You may eat your normal diet, unless your doctor tells you otherwise.    For 24 hours:    Relax and take it easy.    Do NOT smoke.    Do NOT make any important or legal decisions.    Do NOT drive or operate machines at home or at work.    Do NOT drink alcohol.    Remove the Band-Aid after 24 hours. If there is minor oozing, apply another Band-aid and remove it after 12 hours.    For 2 days, do NOT have sex or do any heavy exercise.    Do NOT take a bath, or use a hot tub or pool for at least 3 days. You may shower.        Care of wrist or arm site  It is normal to have soreness at the puncture site and mild tingling in your hand for up to 3 days.    For 2 days, do not use your hand or arm to support your weight (such as rising from a chair) or bend your wrist (such as lifting a garage door).    For 2 days, do not lift more than 5 pounds or exercise your arm (tennis, golf or bowling).    If you start bleeding from the site in your arm:    Sit down and press firmly on the site with your fingers for 10 minutes. Call your doctor as soon as you can.    If the bleeding stops, sit still and keep your wrist straight for 2 hours.    Medicines    If you have started taking Plavix or Effient, do not stop taking it until you talk to your heart doctor (cardiologist).      If you have stopped any other medicines, check with your nurse or provider about when to restart them.    Call 911 right away if you have bleeding that is heavy or does not stop.    Call your doctor if:    You have a large or growing hard lump around the site.    The site is red, swollen, hot or tender.    Blood or fluid is draining from the site.    You have chills or a  fever greater than 101 F (38 C).    Your leg or arm feels numb or cool.    You have hives, a rash or unusual itching.      [facility] [place]:  [phone] (7 days a week)

## 2017-06-16 NOTE — CONSULTS
"CLINICAL NUTRITION SERVICES  -  ASSESSMENT NOTE       REASON FOR ASSESSMENT  Darryl Uribe is a 57 year old male seen by Registered Dietitian for Provider Order - \"Dietitian to see for Heart Healthy Diet education\".        NUTRITION HISTORY  - Information obtained from patient.   - Patient following a low-fat diet at home per his report.  Avoids butter/oils, actually describes as \"no-fat\" but unaware that dairy products and protein sources also contain fats.    - Consumes meals TID.   Breakfast: Frozen/microwave burrito with eggs, sausage or zimmerman.   Lunch: [place], type of meat, cheese, and bread varies  Dinner: Protein + vegetable, sometimes starch  - Reports CHO-containing foods are his \"downfall\".  Tends to overeat with these food items.  Therefore tends to avoid crackers and chips.    - NKFA.          CURRENT NUTRITION ORDERS  Diet Order:     Cardiac    Current Intake/Tolerance:  Good appetite/intake since admission.        PHYSICAL FINDINGS  Observed  No fat/muscle wasting, see below  Obtained from Chart/Interdisciplinary Team  Angio with stent 6/15    ANTHROPOMETRICS  Height: 5' 7\"  Weight: 115 kg (253#)  Body mass index is 39.72 kg/(m^2).  Weight Status:  Obesity Grade II BMI 35-39.9  IBW: 67.3 kg (148#)  % IBW: 171%  Weight History:  Wt Readings from Last 10 Encounters:   06/16/17 115 kg (253 lb 9.6 oz)   - Patient denies recent wt changes.      LABS  Labs reviewed    MEDICATIONS  Medications reviewed    Dosing Weight 79.2 kg - adjusted weight    ASSESSED NUTRITION NEEDS PER APPROVED PRACTICE GUIDELINES:  Estimated Energy Needs: 3183-9207 kcals (20-25 Kcal/Kg)  Justification: obese  Estimated Protein Needs: 79-95 grams protein (1-1.2 g pro/Kg)  Justification: maintenance  Estimated Fluid Needs: 4577-0438 mL (1 mL/Kcal)  Justification: maintenance and per provider pending fluid status    MALNUTRITION:  % Weight Loss:  None noted  % Intake:  No decreased intake noted  Subcutaneous Fat Loss:  None " "observed  Muscle Loss:  None observed  Fluid Retention:  None noted    Malnutrition Diagnosis: Patient does not meet two of the above criteria necessary for diagnosing malnutrition    NUTRITION DIAGNOSIS:  Food and nutrition-related knowledge deficit related to lack of heart healthy diet education as evidenced by patient with misconceptions regarding fat-containing food items.       NUTRITION INTERVENTIONS  Recommendations / Nutrition Prescription  Continue cardiac diet at d/c.       Implementation  Nutrition education: Provided education on cardiac diet:    Assessed learning needs, learning preferences, and willingness to learn    Nutrition Education (Content):  a) Provided handout \"heart healthy nutrition therapy\".   b) Discussed foods which contain saturated fat/trans fat, unsaturated substitutes  c) Discussed the importance of total fat intake  d) Discussed foods high in salt to avoid, appropriate substitutes  e) Discussed importance of increasing fiber intake and sources    Nutrition Education (Application):  a) Discussed eating habits and recommended alternative food choices  b) Encouraged increased fruit/vegetable and whole grain intake.     Patient verbalizes understanding of diet by verbalizing need to check sodium content of breakfast burrito.    Anticipate good compliance    Diet Education - refer to Education Flowsheet    Collaboration and Referral of Nutrition care: Discussed POC with team during rounds.       Nutrition Goals  Patient to name at least 2 dietary changes he will make to current eating habits.      MONITORING AND EVALUATION:  Progress towards goals will be monitored and evaluated per protocol and Practice Guidelines          Savannah Aldana RD, LD  Clinical Dietitian  3rd floor/ICU: [phone]  All other floors: [phone]  Weekend/holiday: [phone]  "

## 2017-06-16 NOTE — PROGRESS NOTES
Cath Procedure  1) CAG  2) PCI proximal RCA 3.5 x 16 everolimus eluting PROMUS PREMIERE post dilated to 4.0     Findings  LMCA, LAD, CX nondominant, no significant stenosis  RCA focal 95% proximal  40% mid     Post procedure  RCA no residual stenosis . DEANNA 3 flow.  Single vessel CAD  ADELFO to RCA    DC instructiuons given:  Meds  PMD FU  Card follow up  Prognosis  Labs,   Cath results    OK to DC  Follow up visits

## 2017-06-16 NOTE — PROGRESS NOTES
Discharge instructions reviewed with patient no unanswered questions.    Side effects of brilinta and metoprolol reviewed with patient.

## 2017-06-16 NOTE — PROGRESS NOTES
06/16/17 1357   Quick Adds   Type of Visit Initial PT Evaluation   Living Environment   Lives With significant other   Living Arrangements house   Home Accessibility stairs within home   Number of Stairs Within Home 14   Stair Railings at Home inside, present on right side   Self-Care   Dominant Hand right   Usual Activity Tolerance good   Current Activity Tolerance moderate   Regular Exercise no   Equipment Currently Used at Home none   Activity/Exercise/Self-Care Comment Indep PLOF   Functional Level Prior   Ambulation 0-->independent   Transferring 0-->independent   Toileting 0-->independent   Bathing 0-->independent   Dressing 0-->independent   Eating 0-->independent   Communication 0-->understands/communicates without difficulty   Swallowing 0-->swallows foods/liquids without difficulty   Cognition 0 - no cognition issues reported   Fall history within last six months no   Which of the above functional risks had a recent onset or change? none   General Information   Onset of Illness/Injury or Date of Surgery - Date 06/15/17   Referring Physician Asher Hassan MD   Patient/Family Goals Statement d/c home today   Pertinent History of Current Problem (include personal factors and/or comorbidities that impact the POC) Darryl Uribe is a 57 year old male who was admitted on 6/14/2017. Patient with  PMH sig for HTN, presented with cp, EKG with inferior q waves and elevated troponin. Admitted for NSTEMI.  Now POD #1 s/p PCI with stenting.  He now reports no chest pain and feeling well.   Precautions/Limitations other (see comments)  (post stenting)   General Observations pt awake sitting EOB, states he feels well   General Info Comments Resting vitals in standing: HR 69, SaO2 97%, /93, no chest pain/pressure.  Per pt, they are holding his BP meds currently.   Cognitive Status Examination   Orientation orientation to person, place and time   Pain Assessment   Patient Currently in Pain No  "  Integumentary/Edema   Integumentary/Edema no deficits were identifed   Integumentary/Edema Comments R radial incisional site dry and intact   Posture    Posture Not impaired   Range of Motion (ROM)   ROM Comment WFL alisa UEs and LEs   Strength   Strength Comments WNL   Bed Mobility   Bed Mobility Comments Indep   Transfer Skills   Transfer Comments Indep   Gait   Gait Comments Indep and steady on feet, ambulatory in room and hallways with anginal symptoms.   Balance   Balance Comments WNL   Sensory Examination   Sensory Perception no deficits were identified   Clinical Impression   Criteria for Skilled Therapeutic Intervention yes, treatment indicated   PT Diagnosis s/p cardiac stenting   Influenced by the following impairments anginal symptoms prior to cardiac stenting   Functional limitations due to impairments impaired tolerance with endurance walking program   Clinical Presentation Stable/Uncomplicated   Clinical Presentation Rationale stable medical status, single body system involved   Clinical Decision Making (Complexity) Low complexity   Therapy Frequency` daily   Predicted Duration of Therapy Intervention (days/wks) 1 day   Anticipated Discharge Disposition Home with Outpatient Therapy  (OP CR orders in chart)   Risk & Benefits of therapy have been explained Yes   Patient, Family & other staff in agreement with plan of care Yes   [facility] [employer] TM \"6 Clicks\"   2016, Trustees of [facility], under license to [employer].  All rights reserved.   6 Clicks Short Forms Basic Mobility Inpatient Short Form   [facility] AM-PAC  \"6 Clicks\" V.2 Basic Mobility Inpatient Short Form   1. Turning from your back to your side while in a flat bed without using bedrails? 4 - None   2. Moving from lying on your back to sitting on the side of a flat bed without using bedrails? 4 - None   3. Moving to and from a bed to a chair (including a wheelchair)? 4 - None   4. Standing up from a chair using your " arms (e.g., wheelchair, or bedside chair)? 4 - None   5. To walk in hospital room? 4 - None   6. Climbing 3-5 steps with a railing? 4 - None   Basic Mobility Raw Score (Score out of 24.Lower scores equate to lower levels of function) 24   Total Evaluation Time   Total Evaluation Time (Minutes) 10

## 2017-06-16 NOTE — DISCHARGE SUMMARY
PRIMARY CARE PROVIDER:  [facility].      DATE OF ADMISSION:  06/14/2017.       DATE OF DISCHARGE:  06/16/2017.        Darryl Uribe is 57.  He was admitted with classic anginal chest pain syndrome 06/14/2017.  I saw the patient yesterday 06/15/2017 and I agreed with the admission impression of progressive - unstable angina with a trace subendocardial MI.  His LV function was normal.  His EKG showed minor nonspecific ST-T changes and small Q-waves inferiorly, but there was no inferior wall motion abnormality.  The patient had a history of treated hypertension and treated hyperlipidemia and a positive family history for coronary artery disease with both parents having had that issue.  He tended to be overweight, but not profoundly so.      Coronary angiography was done by my associate, Dr. Hassan.  This study revealed a high-grade focal stenosis of his RCA with no significant coronary disease in the other vessel.  Dr. Hassan delivered a 3.5 x 16 mm Promus Premier drug-eluting stent dilated to 4 mm.  There was a residual 40% distal or mid vessel stenosis, but this was left alone.  The acute angiographic result was excellent.  The patient was asymptomatic subsequently.  Intervention was done through the right radial artery and it showed no signs of complications post-procedure with a good pulse and a normal hand.      Laboratory data during the admission revealed normal electrolytes, potassium 3.9, creatinine 0.8, BUN 18, albumin 3.9, liver profile normal, hemoglobin A1C 5.8, LDL was 58, total cholesterol 148, triglycerides 212, troponins were serially 1.4 followed by 1.2.      The patient is asymptomatic.  He is going to be discharged.  He will follow up with Cardiology in 2-4 weeks, with his primary care doctor in [place] in 1-2 weeks, he will see me in 2-3 months or sooner if needed.      IMPRESSION:   1.  Unstable angina - trace subendocardial myocardial infarction by troponins.   2.   High-grade focal right coronary artery stenosis treated with a drug-eluting stent as noted above.   3.  Treated hyperlipidemia.   4.  Mild hypertension.   5.  Virtually no coronary disease otherwise.      I feel he can return to work on Monday, which would be 3 days from now.  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      Diet, weight loss and exercise were stressed.      MEDICATIONS ON DISCHARGE:   1.  Nitroglycerin 0.4 mg SL p.r.n.   2.  Aspirin 81 mg a day.   3.  Atorvastatin 40 mg at bedtime.   4.  Lisinopril 40 mg a day.   5.  Metoprolol tartrate 25 mg b.i.d.   6.  Omeprazole 20 mg a day.   7.  The new drug is Brilinta 90 mg b.i.d.   8.  And his previous Effexor 37.5 mg twice a day.      If the Brilinta is too expensive, he can be converted to Plavix.  He says he will check into it with his insurance and see what he can afford.  Once again follow up with primary care in 1-2 weeks, Cardiology in 2-4 weeks and with me in 2-3 months.      Over 35 minutes was spent doing this discharge summary, consult, reviewing records, catheterization, medications, indications, side effects, followup with multiple physicians, etc.         CIRA SQUIRES MD, [facility]             D: 2017 14:05   T: 2017 15:05   MT: [id number]      Name:     ROHITH DALY   MRN:      [id number]        Account:        [id number]   :      1960           Admit Date:                                       Discharge Date:       Document: [id number]       cc: [facility]

## 2017-06-16 NOTE — PLAN OF CARE
Problem: Cardiac: Acute Coronary Syndrome (ACS) (Adult)  Intervention: Monitor/Manage Fluid Balance  A&Ox4, denies pain, SOB, CP. VSS afebrile, on room air. Tele SB, HR 50s. Lung sounds clear. S/p angio on 6/15 with stent, CMS intact to R radial. Cards following, see notes for recommendations. Up indep in the room. Per MD anticipated discharge in 1-2 days if remains stable after Angio. Will cont with POC.

## 2017-06-16 NOTE — DISCHARGE SUMMARY
[facility]    Discharge Summary  Hospitalist    Date of Admission:  6/14/2017  Date of Discharge:  6/16/2017  Discharging Provider: Tita Mata MD  Date of Service (when I saw the patient): 06/16/17    Discharge Diagnoses     1. NSTEMI S/P angiogram with stent to RCA      2. HTN     3. Anxiety    History of Present Illness   Darryl Uribe is an 57 year old male patient with past medical history pf hypertension admitted on 6/14/2017 with a complaint of chest pain. Please see H&P for details.     Hospital Course   Summary of Stay: Darryl Uribe is a 57 year old male who was admitted on 6/14/2017. Patient with  PMH sig for HTN, presented with cp, EKG with inferior q waves and elevated troponin.He was admitted for NSTEMI. He was initially started on heparin drip. He was put on nitroglycerin prn. He was also put on asa, BB, ACEI, statin. Cardiology was consulted and recommended coronary angiogram.Coronary angiogram was done on 6/15 and he was found to have LMCA, LAD, CX nondominant, no significant stenosis.  RCA focal 95% proximal  40% mid stenosis. He had ADELFO to RCA. Cardiology started him on Brilinta 90 mg BID. He was also started on metoprolol 25 mg po BID. Lisinopril was changed to 40 mg daily. Atorvastatin was also increased to 40 mg po daily. Lab workup showed HGBa1c 5.8,  lipid panel: HDL 48, LDL 58, TRIG 212.   Patient was advised to follow up with PCP in 1 week and cardiology as scheduled. He was also advised to     HTN. He was continued on lisinopril at 40 mg daily. He was started on metoprolol 25 mg BID.    Anxiety: He was continued on Effexor    Patient remained stable during hospital course. He was discharged in a stable condition. He was advised to have follow up with PCP and cardiology as outpatient.     Significant Results and Procedures   Results for orders placed or performed during the hospital encounter of 06/14/17   XR Chest 2 Views    Narrative    XR CHEST 2 VW 6/14/2017 8:30  PM     HISTORY: chest pain      Impression    IMPRESSION: Negative exam.    ROBI JASMINE MD         Pending Results   None  Code Status   Full Code       Primary Care Physician   [facility]    Discharge Disposition   Discharged to home  Condition at discharge: Stable    Consultations This Hospital Stay   PHARMACY TO DOSE HEPARIN  PHARMACY TO DOSE HEPARIN  CARDIOLOGY IP CONSULT  CARDIAC REHAB IP CONSULT  NUTRITION SERVICES ADULT IP CONSULT  PHARMACY IP CONSULT  PHARMACY IP CONSULT  PHARMACY DISCHARGE EDUCATION BY PHARMACIST  SMOKING CESSATION PROGRAM IP CONSULT    Time Spent on this Encounter   I, Tita Mata MD, personally saw the patient today and spent greater than 30 minutes discharging this patient.    Discharge Orders     CARDIAC REHAB REFERRAL     Reason for your hospital stay   NSTEMI s/p angiogram     Activity   Your activity upon discharge: activity as tolerated     Follow-up and recommended labs and tests    Follow up with primary care provider, [facility], within 7 days   Follow up with cardiology as scheduled  Outpatient cardiac rehab     Diet   Follow this diet upon discharge: Orders Placed This Encounter     Advance Diet as Tolerated: Regular Diet Adult; Low Saturated Fat Na <2400mg Diet       Discharge Medications   Current Discharge Medication List      START taking these medications    Details   metoprolol (LOPRESSOR) 25 MG tablet Take 1 tablet (25 mg) by mouth 2 times daily  Qty: 60 tablet, Refills: 0    Associated Diagnoses: NSTEMI (non-ST elevated myocardial infarction) (H)      ticagrelor (BRILINTA) 90 MG tablet Take 1 tablet (90 mg) by mouth every 12 hours  Qty: 60 tablet, Refills: 0    Associated Diagnoses: NSTEMI (non-ST elevated myocardial infarction) (H)         CONTINUE these medications which have CHANGED    Details   atorvastatin (LIPITOR) 40 MG tablet Take 1 tablet (40 mg) by mouth daily  Qty: 30 tablet, Refills: 0    Associated Diagnoses:  NSTEMI (non-ST elevated myocardial infarction) (H)      lisinopril (PRINIVIL/ZESTRIL) 40 MG tablet Take 1 tablet (40 mg) by mouth At Bedtime  Qty: 30 tablet, Refills: 0    Associated Diagnoses: NSTEMI (non-ST elevated myocardial infarction) (H)         CONTINUE these medications which have NOT CHANGED    Details   ASPIRIN PO Take 81 mg by mouth At Bedtime      venlafaxine (EFFEXOR) 37.5 MG tablet Take 37.5 mg by mouth 2 times daily      VITAMIN D, CHOLECALCIFEROL, PO Take 400 Units by mouth daily      LECITHIN PO Take 1 tablet by mouth daily      omega 3 1000 MG CAPS Take 1,000 mg by mouth daily      omeprazole (PRILOSEC) 20 MG CR capsule Take 20 mg by mouth 2 times daily           Allergies   No Known Allergies  Data   Most Recent 3 CBC's:  Recent Labs   Lab Test  06/16/17 0817 06/14/17 1714 08/24/09   0640  08/23/09   0529   WBC  5.8  6.5   --   10.0   HGB  13.7  14.5  9.1*  7.6*   MCV  88  88   --   84   PLT  199  231   --   320      Most Recent 3 BMP's:  Recent Labs   Lab Test  06/16/17   0817  06/15/17   0620  06/14/17 1714 08/24/09   0640   08/22/09   0600   NA  140   --   141  131*   < >  130*   POTASSIUM  3.8  4.1  3.7  3.9   --   3.5   CHLORIDE  106   --   109  98   --   96   CO2  28   --   25  30   --   28   BUN  11   --   19   --    --   7   CR  0.86   --   0.88   --    --   0.66   ANIONGAP  6   --   7  3*   --   6   DYANA  9.0   --   9.6   --    --   8.1*   GLC  104*   --   112*   --    --   104*    < > = values in this interval not displayed.     Most Recent 2 LFT's:  Recent Labs   Lab Test  06/14/17 1714 08/18/09   0610   AST  30  29   ALT  43  27   ALKPHOS  38*  38*   BILITOTAL  0.4  <0.1*     Most Recent INR's and Anticoagulation Dosing History:  Anticoagulation Dose History     Recent Dosing and Labs Latest Ref Rng & Units 8/16/2009 6/15/2017    INR 0.86 - 1.14 1.22(H) 1.05        Most Recent 3 Troponin's:  Recent Labs   Lab Test  06/15/17   0620  06/14/17   1956  06/14/17   1714   TROPI   1.284*  1.483*  1.614*     Most Recent Cholesterol Panel:  Recent Labs   Lab Test  06/15/17   0620   CHOL  148   LDL  58   HDL  48   TRIG  212*     Most Recent 6 Bacteria Isolates From Any Culture (See EPIC Reports for Culture Details):  Recent Labs   Lab Test  08/19/09   1120  08/11/09   0550  08/11/09   0310  08/11/09   0305   CULT  No growth  No Salmonella, Shigella, Campylobacter or E coli 0157 isolated.  No growth after 6 days  No growth after 6 days     Most Recent TSH, T4 and A1c Labs:  Recent Labs   Lab Test  06/14/17   1714   A1C  5.8

## 2017-06-16 NOTE — PHARMACY - DISCHARGE MEDICATION RECONCILIATION
Discharge medication review for this patient is complete. Face-to-face medication education was provided by the pharmacist.  See [facility] for allergy information and immunization status.   Pharmacist assisted with medication reconciliation of discharge medications with PTA medications.    Patient was informed to STOP taking the following HOME medications:   none    Patient was informed to START taking the following NEW medications:   Metoprolol and Ticagrelor     Patient was informed to make the following changes to prior to admission medications:  Lisinopril to 40 mg daily, Atorvastatin to 40 mg daily    Patient was educated on the following for each discharge medication:   Rationale for therapy  Duration of treatment  Dosing and or monitoring drug levels  Common side effects  Importance of compliance  Drug/food interactions  Missed doses  Self monitoring parameters    Left written materials and instructions (Clinical notes from [facility]) for new medications: Yes    OUTCOMES: Patient verbalized understanding    Discharge Medication List     Review of your medicines      START taking       Dose / Directions    metoprolol 25 MG tablet   Commonly known as:  LOPRESSOR        Dose:  25 mg   Take 1 tablet (25 mg) by mouth 2 times daily   Quantity:  60 tablet   Refills:  0       ticagrelor 90 MG tablet   Commonly known as:  BRILINTA        Dose:  90 mg   Take 1 tablet (90 mg) by mouth every 12 hours   Quantity:  60 tablet   Refills:  0         CONTINUE these medicines which may have CHANGED, or have new prescriptions. If we are uncertain of the size of tablets/capsules you have at home, strength may be listed as something that might have changed.       Dose / Directions    atorvastatin 40 MG tablet   Commonly known as:  LIPITOR   This may have changed:    - medication strength  - how much to take  - when to take this        Dose:  40 mg   Take 1 tablet (40 mg) by mouth daily   Quantity:  30 tablet   Refills:  0       lisinopril  40 MG tablet   Commonly known as:  PRINIVIL/ZESTRIL   This may have changed:    - medication strength  - how much to take        Dose:  40 mg   Take 1 tablet (40 mg) by mouth At Bedtime   Quantity:  30 tablet   Refills:  0         CONTINUE these medicines which have NOT CHANGED       Dose / Directions    ASPIRIN PO        Dose:  81 mg   Take 81 mg by mouth At Bedtime   Refills:  0       LECITHIN PO        Dose:  1 tablet   Take 1 tablet by mouth daily   Refills:  0       omega 3 1000 MG Caps        Dose:  1000 mg   Take 1,000 mg by mouth daily   Refills:  0       omeprazole 20 MG CR capsule   Commonly known as:  priLOSEC        Dose:  20 mg   Take 20 mg by mouth 2 times daily   Refills:  0       venlafaxine 37.5 MG tablet   Commonly known as:  EFFEXOR        Dose:  37.5 mg   Take 37.5 mg by mouth 2 times daily   Refills:  0       VITAMIN D (CHOLECALCIFEROL) PO        Dose:  400 Units   Take 400 Units by mouth daily   Refills:  0            Where to get your medicines      These medications were sent to [facility] [place], [place] - [address] [address]  [address] [address] [address]     Phone:  [phone]      atorvastatin 40 MG tablet     lisinopril 40 MG tablet     metoprolol 25 MG tablet     ticagrelor 90 MG tablet

## 2017-06-19 ENCOUNTER — CARE COORDINATION (OUTPATIENT)
Dept: CARDIOLOGY | Facility: CLINIC | Age: 57
End: 2017-06-19

## 2017-06-19 NOTE — PROGRESS NOTES
Called patient and left  to discuss any post hospital d/c questions hemay have, review medication changes, and confirm f/u appts. RN advised patient in  that he has an apt scheduled on 6/30/17 with [facility] Anette Forbes. RN also advised patient in  that we would like him to schedule his OP cardiac rehab eval apt. RN provided phone number for OP cardiac rehab scheduling in . Patient advised in  to call clinic with any cardiac related questions or concerns prior to his apt on 6/30/17.

## 2017-06-21 ENCOUNTER — DOCUMENTATION ONLY (OUTPATIENT)
Dept: CARDIOLOGY | Facility: CLINIC | Age: 57
End: 2017-06-21

## 2017-06-26 PROBLEM — I10 BENIGN ESSENTIAL HYPERTENSION: Status: ACTIVE | Noted: 2017-06-26

## 2017-06-26 PROBLEM — K51.90 ULCERATIVE COLITIS (H): Status: ACTIVE | Noted: 2017-06-26

## 2017-06-26 PROBLEM — F41.1 GAD (GENERALIZED ANXIETY DISORDER): Status: ACTIVE | Noted: 2017-06-26

## 2017-06-26 PROBLEM — E78.2 MIXED HYPERLIPIDEMIA: Status: ACTIVE | Noted: 2017-06-26

## 2017-06-29 ENCOUNTER — HOSPITAL ENCOUNTER (OUTPATIENT)
Dept: CARDIAC REHAB | Facility: CLINIC | Age: 57
End: 2017-06-29
Attending: INTERNAL MEDICINE
Payer: COMMERCIAL

## 2017-06-29 VITALS — WEIGHT: 252.2 LBS | HEIGHT: 67 IN | BODY MASS INDEX: 39.58 KG/M2

## 2017-06-29 PROCEDURE — 93798 PHYS/QHP OP CAR RHAB W/ECG: CPT | Performed by: REHABILITATION PRACTITIONER

## 2017-06-29 PROCEDURE — 40000116 ZZH STATISTIC OP CR VISIT: Performed by: REHABILITATION PRACTITIONER

## 2017-06-29 PROCEDURE — 40000575 ZZH STATISTIC OP CARDIAC VISIT #2: Performed by: REHABILITATION PRACTITIONER

## 2017-06-29 PROCEDURE — 93797 PHYS/QHP OP CAR RHAB WO ECG: CPT | Mod: 59 | Performed by: REHABILITATION PRACTITIONER

## 2017-06-29 ASSESSMENT — 6 MINUTE WALK TEST (6MWT)
PREDICTED: 1622
TOTAL DISTANCE WALKED (FT): 1923
MALE CALC: 1612.17
FEMALE CALC: 1424
GENDER SELECTION: MALE

## 2017-06-29 NOTE — PROGRESS NOTES
06/29/17 0900   Session  I have established, reviewed and made necessary changes to the individualized treatment plan and exercise prescription for this patient.    Physician Name (printed): ________________________   Date: _______  Time: ______    Physician Signature: ___________________________________________    Darryl Uribe  1960  NSTEMI/Stent       Session Initial Evaluation and Exercise Prescription   Certified through this date 07/28/17   Cardiac Rehab Assessment   Cardiac Rehab Assessment 6/29/17 PT seen for intial evaluation. PT is post NSTEMI and stent of RCA on 6/14/17. He has had some anxiety post MI, and was put on an anti-anxiety medication. He denies a history of depression. He has not had any cardiac symptoms. He had about a 2 month history of chest pain/tightness with walking. He has been walking 3 to 5 miles per day since hospital discharge. PT has been tired. Instructed PT to follow cardiac rehab guidelines. HR at 85% of APMHR with 6 minute walk test. PT will see NP tomorrow. Skilled therapy needed to safely progress exercise levels while monitoring HR and BP and work with PT to increase confidence to exercise independently. Therapy needed to work with PT on weight loss goals of 1 to 2 lbs per week. PT appears motivated to work on weight loss and exercise goals.     The patient's history and clinical status including hemodynamics and ECG were evaluated.  The patient was assessed to be stable and appropriate to begin exercise.   The patient's functional capacity and exercise prescription were determined by the completion of the 6 minute walk test.  See results below.  The patient was oriented to the program.  Risk factor profile was completed. Goals and objectives were discussed. HR at 85% of APMHR with 6 minute walk.  No symptoms, complaints or pain were reported. Good prognosis for reaching above goals. Skilled therapy is necessary in order to monitor CV response to exercise, to provide  "education on risk factors and behavior change counseling needed to achieve patient's goals.  Plan to progress to 30-40 minutes of exercise prior to discharge from cardiac rehab.  Initial THR of 20-30 beats above RHR; Effort rating of 4-6.  Initiate muscle conditioning as appropriate.  Provide risk factor education and behavior change counseling.    General Information   Treatment Diagnosis NSTEMI   Date of Treatment Diagnosis 06/14/17   Secondary Treatment Diagnosis Stent   Significant Past CV History None   Comorbidities None   Other Medical History Illiostomy   Lead up symptoms SOB, slurred speech, 2 month history of chest pain/tightness   Hospital Location [facility]   Hospital Discharge Date 06/16/17   Signs and Symptoms Post Hospital Discharge SOB, fatigue  (PT may be \"overdoing it\")   Outpatient Cardiac Rehab Start Date 06/29/17   Primary Physician Dr. Oseas Correa   Primary Physician Follow Up Completed   Cardiologist Dr. Simon   Cardiologist Follow Up Scheduled   Ejection Fraction 60-65%   Risk Stratification Moderate   Summary of Cath Report   Summary of Cath Report Available   Date Performed 06/14/17   RCA 95% to 40% stenosis  (ADELFO)   Living and Work Status    Living Arrangements and Social Status spouse   Support System Live with an adult   Return to Employment Yes   Occupation Computer work   Preventative Medications   CMS recommended medications Ace inhibitors;Antiplatelets;Beta Blocker;Lipid Lowering;Influenza vaccination   Falls Screen   Have you fallen two or more times in the past year? No   Have you fallen and had an injury in the past year? No   Pain   Patient Currently in Pain No   Physical Assessments   Incisions Not applicable   Edema +1 Trace   Right Lung Sounds not assessed   Left Lung Sounds not assessed   Comments wrist site sore from angiogram   Individualized Treatment Plan   Monitored Sessions Scheduled 18   Monitored Sessions Attended 1   Oxygen   Supplemental Oxygen needed No   Nutrition " "Management - Weight Management   Assessment Initial Assessment   Age 57   Weight 114.4 kg (252 lb 3.2 oz)   Height 1.702 m (5' 7.01\")   BMI (Calculated) 39.57   Initial Rate Your Plate Score. Dietary tool to assess eating patterns. Scores range from 24 to 72. The higher the score the healthier the eating pattern. 58   Nutrition Management - Lipids   Lipids Labs Available   Date 06/14/17   Total Cholesterol 148   Triglycerides 212   HDL 48   LDL 58   Prescribed Lipid Medication Yes   Statin Intensity High Intensity   Nutrition Management - Diabetes   Diabetes No   Nutrition Management Summary   Dietary Recommendations Low Fat;Low Cholesterol;Low Sodium   Stages of Change for Diet Compliance Preparation   Interventions Planned Attend Nutrition Education Class(es);Refer for Individual Diet Consultation;Complete Food Diary;Instruct on Label Reading;Educate on Weight Management Principles;Educate on Benefits of Exercise   Patient Goals Goal #1   Goal #1 Description PT would like to work on weight loss of 1 to 2 lbs per week. His short term goal is 180 lbs and long term goal is 168 lbs.    Goal #1 Target Date 08/29/17   Nutrition Target Outcome Weight loss .5-1 lb/week (if BMI > 25)   Psychosocial Management   Psychosocial Assessment Initial   Is there history of clinical depression or increased risk of depression? No previous history  (Anxiety)   Current Level of Stress per Patient Report Moderate   (work related)   Current Coping Skills Is on Medication for Depression/Anxiety;Uses Stress Management/Relaxation Techniques  (walking and deep breathing)   Initial Patient Health Questionnaire -9 Score (PHQ-9) for depression. 5-9 Minimal symptoms, 10-14 Minor depression, 15-19 Major depression, moderately severe, > 20 Major depression, severe  17   Initial [facility] Survey score.  Quality of Life:   If total score > 25 review individual areas where patient rated a 4 or 5.  Consider patients current medical condition and " "what role that plays on the score.   Adjust treatment protocol to improve areas of concern.  Consider the following:  PHQ9 score, DASI, and re-assessment within the next 30 days to assist with developing treatments.  1   Stages of Change Action   Interventions Planned Patient to verbalize understanding of behavioral assessment results;Patient to verbalize understanding of negative impact of stress to personal health;Patient will recognize signs and symptoms of depression;Patient interested in implementing one strategy to reduce current level of stress/anxiety;Patient to attend stress management class(es)   Interventions In Progress or Completed Patient is able to identify positive support system  (wife is supportive)   Patient Goal No   Psychosocial Comments No history of depression. PT states that he does have anxiety at times. He is a \"type A\" and likes things a certain way. /   Psychosocial Target Outcome Identify absence or presence of depression using valid screening tool;Maximize coping skills   Other Core Components - Hypertension   History of or Diagnosis of Hypertension Yes   Currently taking Anti-Hypertensives Yes;Beta blocker;Ace Inhibitor   Other Core Components - Tobacco   History of Tobacco Use Never   Other Core Components Summary   Interventions Planned None   Activity/Exercise History   Activity/Exercise Assessment Initial   Activity/Exercise Status prior to event? Was Physically Active   Number of Days Currently participating in Moderate Physical Activity? 2   Number of Days Currently performing  Aerobic Exercise (including rehab)? Biking or jog/walk one day per week, more sportic  (1)   Number of Minutes per Session Currently of Aerobic Exercise (average)? 1 hour   Current Stage of Change (Physical Activity) Preparation   Current Stage of Change (Aerobic Exercise) Preparation   Patient Goals Goal #1   Goal #1 Description PT will attend cardiac rehab 2 to 3 times per week to increase confidence " with goal of adding jogging before cardiac rehab is complete. PT would like to run a 5K in a year.    Goal #1 Target Date 07/29/17   Activity/Exercise Comments PT ran 2-5K's this year. He would like to run another next year.    Activity/Exercise Target Outcome An Accumulation of 150  Minutes of Aerobic Activity per Week   Exercise Assessment   6 Minute Walk Predicted - Gender Selection Male   6 Minute Walk Predicted (Male) 1612.17   6 Minute Walk Predicted (Female) 1424   Initial 6 Minute Walk Distance (Feet) 1923 ft   Resting HR 74 bpm   Exercise  bpm   Post Exercise HR 89 bpm   Resting /70   Exercise /70   Post Exercise /64   Current MET Level 3.8   MET Level Goal 5.5 to 6.5   ECG Rhythm Sinus rhythm   Ectopy None   Current Symptoms Denies symptoms   Limitations/Restrictions None   Exercise Prescription   Mode Treadmill;Airdyne;Nustep;Elliptical;Weights   Duration/Time 30-45 min   Frequency 2 days/week   THR (85% of age predicted max HR) 138.55   OMNI Effort Rating (0-10 Scale) 4-6/10   Progression Continuous bouts;Total exercise time of 30-45 minutes;Aerobic exercise to OMNI rating of 6 or below and at or below THR;Progress peak intensity by 1/2 MET per week   Recommended Home Exercise   Type of Exercise Walking;Treadmill   Frequency (days per week) on days off from rehab.    Duration (minutes per session) 15-30 min   Effort Rating Recommended 4-6/10   30 Day Exercise Plan PT to exercise on days off from rehab to increase to 3 miles by the end of 30 days.    Current Home Exercise   Type of Exercise Walking  (PT has a TM and weights at home)   Frequency (days per week) daily   Duration (minutes per session) 3 to 5 miles  (instructed PT to walk 20 to 25 minutes until rehab increases)   Follow-up/On-going Support   Provider follow-up needed on the following Heart Rate  (HR at 85% of APMHR with 6 minute walk test)   Learning Assessment   Learner Patient   Primary Language English   Preferred  Learning Style Listening;Reading;Demonstration   Barriers to Learning No barriers noted   Patient Education   Education recommended Anatomy and Physiology of the Heart;Blood Pressure;Exercise Principles;Medication Overview;Nutrition;Stress Management   Education Comments PT is not sure he will be able to attend classes due to work schedule.

## 2017-06-30 ENCOUNTER — OFFICE VISIT (OUTPATIENT)
Dept: CARDIOLOGY | Facility: CLINIC | Age: 57
End: 2017-06-30
Attending: INTERNAL MEDICINE
Payer: COMMERCIAL

## 2017-06-30 VITALS
OXYGEN SATURATION: 97 % | HEART RATE: 67 BPM | WEIGHT: 253 LBS | SYSTOLIC BLOOD PRESSURE: 120 MMHG | BODY MASS INDEX: 39.71 KG/M2 | HEIGHT: 67 IN | DIASTOLIC BLOOD PRESSURE: 78 MMHG

## 2017-06-30 DIAGNOSIS — I21.4 NSTEMI (NON-ST ELEVATED MYOCARDIAL INFARCTION) (H): ICD-10-CM

## 2017-06-30 DIAGNOSIS — E78.2 MIXED HYPERLIPIDEMIA: ICD-10-CM

## 2017-06-30 DIAGNOSIS — Z98.61 POSTSURGICAL PERCUTANEOUS TRANSLUMINAL CORONARY ANGIOPLASTY STATUS: Primary | ICD-10-CM

## 2017-06-30 PROCEDURE — 99214 OFFICE O/P EST MOD 30 MIN: CPT | Performed by: NURSE PRACTITIONER

## 2017-06-30 RX ORDER — NITROGLYCERIN 0.4 MG/1
TABLET SUBLINGUAL
Qty: 25 TABLET | Refills: 1 | Status: SHIPPED | OUTPATIENT
Start: 2017-06-30 | End: 2018-02-13

## 2017-06-30 NOTE — LETTER
6/30/2017    [facility]  [id number]  [facility] [id number]    RE: Darryl Uribe       Dear Colleague,    I had the pleasure of seeing Darryl Uribe in the [facility].    HPI and Plan:   Darryl Uribe is a 57 year old male who was admitted on 6/14/2017 with chest pain. His past medical history includes: HTN. He presented with cp, EKG with inferior q waves and elevated troponin.He was admitted for NSTEMI. He was initially started on heparin drip. He was put on nitroglycerin prn. He was also put on asa, BB, ACEI, statin. Cardiology was consulted and recommended coronary angiogram.Coronary angiogram was done on 6/15 and he was found to have LMCA, LAD, CX nondominant, no significant stenosis.  RCA focal 95% proximal  40% mid stenosis. He had ADELFO to RCA. He was started on Brilinta 90 mg BID. He was also started on metoprolol 25 mg po BID. Lisinopril was changed to 40 mg daily. Atorvastatin was also increased to 40 mg po daily. Lab workup showed HGBa1c 5.8,  lipid panel: HDL 48, LDL 58, TRIG 212.   Pt reports today he is doing well.  He is having difficulties not walking the distances 3-5 miles per day he is used to walking prior to his MI.  Cardiac rehab is working closely with him with education of progression of activity after a MI. He is back at work.   Social History: Mother with MI at age of 60 years, has 2 sisters unaware of their health. No immediate family.     Today he denies light headedness, chest pain or pressure, shortness of breath at rest or with exertion. Some right arm tenderness from procedure persists.       ASSESSMENT/PLAN    1. NSTEMI S/P angiogram with stent to RCA -stable without new symptoms of angina, heart failure or arrhythmias. Continue with cardiac rehab and currently medications.  Switch to plavix when current dose of brilinta is through, pt has 3 months worth.      2. HTN-well controlled      3. Anxiety- focused in on his health state,  "disbelief \"this has happened to me\"- continueal suppport of Cardiac Rehab team and providers  4. Obesity- reviewed previus weight loss efforts, set-up weight loss goals    More than 50 % of this 30 minute clinic visit was spent in counseling and coordinating of cares    .  No orders of the defined types were placed in this encounter.      Orders Placed This Encounter   Medications     nitroGLYcerin (NITROSTAT) 0.4 MG sublingual tablet     Sig: For chest pain place 1 tablet under the tongue every 5 minutes for 3 doses. If symptoms persist 5 minutes after 1st dose call 911.     Dispense:  25 tablet     Refill:  1       Medications Discontinued During This Encounter   Medication Reason     LECITHIN PO Therapy completed         Encounter Diagnoses   Name Primary?     Postsurgical percutaneous transluminal coronary angioplasty status Yes     NSTEMI (non-ST elevated myocardial infarction) (H)      Mixed hyperlipidemia        CURRENT MEDICATIONS:  Current Outpatient Prescriptions   Medication Sig Dispense Refill     nitroGLYcerin (NITROSTAT) 0.4 MG sublingual tablet For chest pain place 1 tablet under the tongue every 5 minutes for 3 doses. If symptoms persist 5 minutes after 1st dose call 911. 25 tablet 1     atorvastatin (LIPITOR) 40 MG tablet Take 1 tablet (40 mg) by mouth daily 30 tablet 0     lisinopril (PRINIVIL/ZESTRIL) 40 MG tablet Take 1 tablet (40 mg) by mouth At Bedtime 30 tablet 0     metoprolol (LOPRESSOR) 25 MG tablet Take 1 tablet (25 mg) by mouth 2 times daily 60 tablet 0     ticagrelor (BRILINTA) 90 MG tablet Take 1 tablet (90 mg) by mouth every 12 hours 60 tablet 0     ASPIRIN PO Take 81 mg by mouth At Bedtime       venlafaxine (EFFEXOR) 37.5 MG tablet Take 37.5 mg by mouth 2 times daily       VITAMIN D, CHOLECALCIFEROL, PO Take 400 Units by mouth daily       omega 3 1000 MG CAPS Take 1,000 mg by mouth daily       omeprazole (PRILOSEC) 20 MG CR capsule Take 20 mg by mouth 2 times daily         ALLERGIES   " "No Known Allergies    PAST MEDICAL HISTORY:  Past Medical History:   Diagnosis Date     Anxiety      CAD (coronary artery disease)     NonSTEMI 06/2017. Stent to RCA     Hyperlipemia      Hypertension      Ulcerative colitis (H)        PAST SURGICAL HISTORY:  History reviewed. No pertinent surgical history.    FAMILY HISTORY:  Family History   Problem Relation Age of Onset     Myocardial Infarction Mother        SOCIAL HISTORY:  Social History     Social History     Marital status:      Spouse name: N/A     Number of children: N/A     Years of education: N/A     Social History Main Topics     Smoking status: Never Smoker     Smokeless tobacco: None     Alcohol use Yes      Comment: 2 drinks per year     Drug use: None     Sexual activity: No     Other Topics Concern     Parent/Sibling W/ Cabg, Mi Or Angioplasty Before 65f 55m? Yes     Social History Narrative       Review of Systems:  Skin:  Negative       Eyes:  Negative      ENT:  Negative      Respiratory:  Negative       Cardiovascular:    Positive for;fatigue    Gastroenterology: Negative      Genitourinary:  Negative      Musculoskeletal:  Negative      Neurologic:  Negative      Psychiatric:  Negative      Heme/Lymph/Imm:  Negative      Endocrine:  Negative        Physical Exam:  Vitals: /78  Pulse 67  Ht 1.702 m (5' 7\")  Wt 114.8 kg (253 lb)  SpO2 97%  BMI 39.63 kg/m2    Constitutional:           Skin:           Head:           Eyes:           ENT:           Neck:           Chest:             Cardiac:                    Abdomen:           Vascular:                                          Extremities and Back:                 Neurological:               Thank you for allowing me to participate in the care of your patient.    Sincerely,     ZACHERY Chen CNP     [facility]      "

## 2017-06-30 NOTE — MR AVS SNAPSHOT
After Visit Summary   6/30/2017    Darryl Uribe    MRN: [id number]           Patient Information     Date Of Birth          1960        Visit Information        Provider Department      6/30/2017 8:10 AM Anette Forbes, [facility]ACH[facility] [place]        Today's Diagnoses     Postsurgical percutaneous transluminal coronary angioplasty status    -  1    NSTEMI (non-ST elevated myocardial infarction) (H)        Mixed hyperlipidemia          Care Instructions    Percutaneous coronary intervention proximal right coronary: 3.5 x  16 mm length everolimus eluting PROMUS PREMIERE     Continue with Brilinta for now, discuss switching to Plavix 75 mg daily    Strategies for Healthy Lifestyle    1. Mediterranean Diet-Handout    2. Keep blood pressure less than 130/80.  Your blood pressure today   BP Readings from Last 1 Encounters:   06/30/17 120/78       3. Exercise Prescription: 150 minutes per week      Your exercise goal: Complete cardiac rehab program    4. Lose Weight  Start with 5 % weight loss goal:  your weight today is   Vitals:    06/30/17 0816   Weight: 114.8 kg (253 lb)           Weight Loss Goal:  241 pounds short term goal by the completion of Cardiac rehab.        180 pounds in the next year.      5. Moderate alcohol consumption-     6. If you smoke, Quit!        Smoking Cessation Referral:   [place] QUITPLAN Services:[phone]-PLAN                 [email or web address]           Follow-ups after your visit        Your next 10 appointments already scheduled     Jul 03, 2017  8:00 AM CDT   Cardiac Treatment with Rh Cardiac Rehab 1   [facility] ([facility])    [address], [address]  [facility] [address] [phone]            Jul 05, 2017  8:00 AM CDT   Cardiac Treatment with Rh Cardiac Rehab 1   [facility] ([facility])    [address], [address]  [facility] [address]    [phone]            Jul 07, 2017  8:00 AM CDT   Cardiac Treatment with Rh Cardiac Rehab 1   [facility] ([facility])    [address]  [facility] [address]   [phone]            Jul 10, 2017  3:00 PM CDT   Cardiac Treatment with Rh Cardiac Rehab 1   [facility] ([facility])    [address], [address]  [facility] [address]   [phone]            Jul 12, 2017  3:00 PM CDT   Cardiac Treatment with Rh Cardiac Rehab 1   [facility] ([facility])    [address], [address]  [facility] [address]   [phone]            Jul 13, 2017  6:45 AM CDT   Cardiac Treatment with Rh Cardiac Rehab 1   [facility] ([facility])    [address], [address]  [facility] [address]   [phone]            Jul 18, 2017  6:45 AM CDT   Cardiac Treatment with Rh Cardiac Rehab 1   [facility] ([facility])    [address]  [facility] [address]   [phone]            Jul 19, 2017  3:00 PM CDT   Cardiac Treatment with Rh Cardiac Rehab 1   [facility] ([facility])    [address]  [facility] [address]   [phone]            Jul 21, 2017  8:00 AM CDT   Cardiac Treatment with Rh Cardiac Rehab 1   [facility] ([facility])    [address]  [facility] [address]   [phone]            Jul 24, 2017  8:00 AM CDT   Cardiac Treatment with Rh Cardiac Rehab 1   [facility] ([facility])    [address]  [facility] [address]   [phone]              Who to contact     If you have questions or need follow up information about today's clinic visit or your schedule please contact [facility] directly at  "[phone].  Normal or non-critical lab and imaging results will be communicated to you by [employer], letter or phone within 4 business days after the clinic has received the results. If you do not hear from us within 7 days, please contact the clinic through [employer] or phone. If you have a critical or abnormal lab result, we will notify you by phone as soon as possible.  Submit refill requests through [employer] or call your pharmacy and they will forward the refill request to us. Please allow 3 business days for your refill to be completed.          Additional Information About Your Visit        [employer] Information     [employer] gives you secure access to your electronic health record. If you see a primary care provider, you can also send messages to your care team and make appointments. If you have questions, please call your primary care clinic.  If you do not have a primary care provider, please call [phone] and they will assist you.        Care EveryWhere ID     This is your Care EveryWhere ID. This could be used by other organizations to access your [facility] medical records  [id number]        Your Vitals Were     Pulse Height Pulse Oximetry BMI (Body Mass Index)          67 1.702 m (5' 7\") 97% 39.63 kg/m2         Blood Pressure from Last 3 Encounters:   06/30/17 120/78   06/16/17 173/82    Weight from Last 3 Encounters:   06/30/17 114.8 kg (253 lb)   06/29/17 114.4 kg (252 lb 3.2 oz)   06/16/17 115 kg (253 lb 9.6 oz)              Today, you had the following     No orders found for display         Today's Medication Changes          These changes are accurate as of: 6/30/17  8:43 AM.  If you have any questions, ask your nurse or doctor.               Start taking these medicines.        Dose/Directions    nitroGLYcerin 0.4 MG sublingual tablet   Commonly known as:  NITROSTAT   Used for:  Postsurgical percutaneous transluminal coronary angioplasty status, NSTEMI (non-ST elevated myocardial infarction) (H), " Mixed hyperlipidemia   Started by:  Anette Forbes, APRN CNP        For chest pain place 1 tablet under the tongue every 5 minutes for 3 doses. If symptoms persist 5 minutes after 1st dose call 911.   Quantity:  25 tablet   Refills:  1            Where to get your medicines      These medications were sent to [facility] - [place], [place] - [address] AT Portal to Registered [place] [id number], [facility] [address]     Phone:  [phone]     nitroGLYcerin 0.4 MG sublingual tablet                Primary Care Provider Fax #    [facility] [phone] [address] [facility] [address]        Equal Access to Services     GARRETT SOSA : Hadii erasto Pike, waaxda luqadaha, qaybta kaalmada deborah, ting sharma. So [facility] [phone].    ATENCIÓN: Si habla español, tiene a kincaid disposición servicios gratuitos de asistencia lingüística. Llame al [phone].    We comply with applicable federal civil rights laws and [place] laws. We do not discriminate on the basis of race, color, national origin, age, disability sex, sexual orientation or gender identity.            Thank you!     Thank you for choosing [facility]  for your care. Our goal is always to provide you with excellent care. Hearing back from our patients is one way we can continue to improve our services. Please take a few minutes to complete the written survey that you may receive in the mail after your visit with us. Thank you!             Your Updated Medication List - Protect others around you: Learn how to safely use, store and throw away your medicines at www.disposemymeds.org.          This list is accurate as of: 6/30/17  8:43 AM.  Always use your most recent med list.                   Brand Name Dispense Instructions for use Diagnosis    ASPIRIN PO      Take 81 mg by mouth At Bedtime         atorvastatin 40 MG tablet    LIPITOR    30 tablet    Take 1 tablet (40 mg) by mouth daily    NSTEMI (non-ST elevated myocardial infarction) (H)       lisinopril 40 MG tablet    PRINIVIL/ZESTRIL    30 tablet    Take 1 tablet (40 mg) by mouth At Bedtime    NSTEMI (non-ST elevated myocardial infarction) (H)       metoprolol 25 MG tablet    LOPRESSOR    60 tablet    Take 1 tablet (25 mg) by mouth 2 times daily    NSTEMI (non-ST elevated myocardial infarction) (H)       nitroGLYcerin 0.4 MG sublingual tablet    NITROSTAT    25 tablet    For chest pain place 1 tablet under the tongue every 5 minutes for 3 doses. If symptoms persist 5 minutes after 1st dose call 911.    Postsurgical percutaneous transluminal coronary angioplasty status, NSTEMI (non-ST elevated myocardial infarction) (H), Mixed hyperlipidemia       omega 3 1000 MG Caps      Take 1,000 mg by mouth daily        omeprazole 20 MG CR capsule    priLOSEC     Take 20 mg by mouth 2 times daily        ticagrelor 90 MG tablet    BRILINTA    60 tablet    Take 1 tablet (90 mg) by mouth every 12 hours    NSTEMI (non-ST elevated myocardial infarction) (H)       venlafaxine 37.5 MG tablet    EFFEXOR     Take 37.5 mg by mouth 2 times daily        VITAMIN D (CHOLECALCIFEROL) PO      Take 400 Units by mouth daily

## 2017-06-30 NOTE — PATIENT INSTRUCTIONS
Percutaneous coronary intervention proximal right coronary: 3.5 x  16 mm length everolimus eluting PROMUS PREMIERE     Continue with Brilinta for now, discuss switching to Plavix 75 mg daily    Strategies for Healthy Lifestyle    1. Mediterranean Diet-Handout    2. Keep blood pressure less than 130/80.  Your blood pressure today   BP Readings from Last 1 Encounters:   06/30/17 120/78       3. Exercise Prescription: 150 minutes per week      Your exercise goal: Complete cardiac rehab program    4. Lose Weight  Start with 5 % weight loss goal:  your weight today is   Vitals:    06/30/17 0816   Weight: 114.8 kg (253 lb)           Weight Loss Goal:  241 pounds short term goal by the completion of Cardiac rehab.        180 pounds in the next year.      5. Moderate alcohol consumption-     6. If you smoke, Quit!        Smoking Cessation Referral:   [place] QUIT[place] Services:[phone]                 [email or web address]

## 2017-06-30 NOTE — PROGRESS NOTES
"HPI and Plan:   Darryl Uribe is a 57 year old male who was admitted on 6/14/2017 with chest pain. His past medical history includes: HTN. He presented with cp, EKG with inferior q waves and elevated troponin.He was admitted for NSTEMI. He was initially started on heparin drip. He was put on nitroglycerin prn. He was also put on asa, BB, ACEI, statin. Cardiology was consulted and recommended coronary angiogram.Coronary angiogram was done on 6/15 and he was found to have LMCA, LAD, CX nondominant, no significant stenosis.  RCA focal 95% proximal  40% mid stenosis. He had ADELFO to RCA. He was started on Brilinta 90 mg BID. He was also started on metoprolol 25 mg po BID. Lisinopril was changed to 40 mg daily. Atorvastatin was also increased to 40 mg po daily. Lab workup showed HGBa1c 5.8,  lipid panel: HDL 48, LDL 58, TRIG 212.   Pt reports today he is doing well.  He is having difficulties not walking the distances 3-5 miles per day he is used to walking prior to his MI.  Cardiac rehab is working closely with him with education of progression of activity after a MI. He is back at work.   Social History: Mother with MI at age of 60 years, has 2 sisters unaware of their health. No immediate family.     Today he denies light headedness, chest pain or pressure, shortness of breath at rest or with exertion. Some right arm tenderness from procedure persists.       ASSESSMENT/PLAN    1. NSTEMI S/P angiogram with stent to RCA -stable without new symptoms of angina, heart failure or arrhythmias. Continue with cardiac rehab and currently medications.  Switch to plavix when current dose of brilinta is through, pt has 3 months worth.      2. HTN-well controlled      3. Anxiety- focused in on his health state, disbelief \"this has happened to me\"- continueal suppport of Cardiac Rehab team and providers  4. Obesity- reviewed previus weight loss efforts, set-up weight loss goals    More than 50 % of this 30 minute clinic visit was spent " in counseling and coordinating of cares    .  No orders of the defined types were placed in this encounter.      Orders Placed This Encounter   Medications     nitroGLYcerin (NITROSTAT) 0.4 MG sublingual tablet     Sig: For chest pain place 1 tablet under the tongue every 5 minutes for 3 doses. If symptoms persist 5 minutes after 1st dose call 911.     Dispense:  25 tablet     Refill:  1       Medications Discontinued During This Encounter   Medication Reason     LECITHIN PO Therapy completed         Encounter Diagnoses   Name Primary?     Postsurgical percutaneous transluminal coronary angioplasty status Yes     NSTEMI (non-ST elevated myocardial infarction) (H)      Mixed hyperlipidemia        CURRENT MEDICATIONS:  Current Outpatient Prescriptions   Medication Sig Dispense Refill     nitroGLYcerin (NITROSTAT) 0.4 MG sublingual tablet For chest pain place 1 tablet under the tongue every 5 minutes for 3 doses. If symptoms persist 5 minutes after 1st dose call 911. 25 tablet 1     atorvastatin (LIPITOR) 40 MG tablet Take 1 tablet (40 mg) by mouth daily 30 tablet 0     lisinopril (PRINIVIL/ZESTRIL) 40 MG tablet Take 1 tablet (40 mg) by mouth At Bedtime 30 tablet 0     metoprolol (LOPRESSOR) 25 MG tablet Take 1 tablet (25 mg) by mouth 2 times daily 60 tablet 0     ticagrelor (BRILINTA) 90 MG tablet Take 1 tablet (90 mg) by mouth every 12 hours 60 tablet 0     ASPIRIN PO Take 81 mg by mouth At Bedtime       venlafaxine (EFFEXOR) 37.5 MG tablet Take 37.5 mg by mouth 2 times daily       VITAMIN D, CHOLECALCIFEROL, PO Take 400 Units by mouth daily       omega 3 1000 MG CAPS Take 1,000 mg by mouth daily       omeprazole (PRILOSEC) 20 MG CR capsule Take 20 mg by mouth 2 times daily         ALLERGIES   No Known Allergies    PAST MEDICAL HISTORY:  Past Medical History:   Diagnosis Date     Anxiety      CAD (coronary artery disease)     NonSTEMI 06/2017. Stent to RCA     Hyperlipemia      Hypertension      Ulcerative colitis  "(H)        PAST SURGICAL HISTORY:  History reviewed. No pertinent surgical history.    FAMILY HISTORY:  Family History   Problem Relation Age of Onset     Myocardial Infarction Mother        SOCIAL HISTORY:  Social History     Social History     Marital status:      Spouse name: N/A     Number of children: N/A     Years of education: N/A     Social History Main Topics     Smoking status: Never Smoker     Smokeless tobacco: None     Alcohol use Yes      Comment: 2 drinks per year     Drug use: None     Sexual activity: No     Other Topics Concern     Parent/Sibling W/ Cabg, Mi Or Angioplasty Before 65f 55m? Yes     Social History Narrative       Review of Systems:  Skin:  Negative       Eyes:  Negative      ENT:  Negative      Respiratory:  Negative       Cardiovascular:    Positive for;fatigue    Gastroenterology: Negative      Genitourinary:  Negative      Musculoskeletal:  Negative      Neurologic:  Negative      Psychiatric:  Negative      Heme/Lymph/Imm:  Negative      Endocrine:  Negative        Physical Exam:  Vitals: /78  Pulse 67  Ht 1.702 m (5' 7\")  Wt 114.8 kg (253 lb)  SpO2 97%  BMI 39.63 kg/m2    Constitutional:           Skin:           Head:           Eyes:           ENT:           Neck:           Chest:             Cardiac:                    Abdomen:           Vascular:                                          Extremities and Back:                 Neurological:                 CC  Asher Hassan MD   [facility]  [address]  [place] [address]              "

## 2017-07-03 ENCOUNTER — HOSPITAL ENCOUNTER (OUTPATIENT)
Dept: CARDIAC REHAB | Facility: CLINIC | Age: 57
End: 2017-07-03
Attending: INTERNAL MEDICINE
Payer: COMMERCIAL

## 2017-07-03 PROCEDURE — 40000116 ZZH STATISTIC OP CR VISIT: Performed by: OCCUPATIONAL THERAPIST

## 2017-07-03 PROCEDURE — 93798 PHYS/QHP OP CAR RHAB W/ECG: CPT | Performed by: OCCUPATIONAL THERAPIST

## 2017-07-03 ASSESSMENT — 6 MINUTE WALK TEST (6MWT)
TOTAL DISTANCE WALKED (FT): 1923
GENDER SELECTION: MALE

## 2017-07-03 NOTE — PROGRESS NOTES
"Darryl Uribe   57 year old  NSTEMI/Stent 07/03/17 0900   Session   Session 30 Day Individualized Treatment Plan   Certified through this date 08/05/17   Physician cosignature/electronic signature indicates approval of this ITP document. I have established, reviewed and made necessary changes to the individualized treatment plan and exercise prescription for this patient.   Cardiac Rehab Assessment   Cardiac Rehab Assessment 6/29/17 PT seen for intial evaluation. PT is post NSTEMI and stent of RCA on 6/14/17. He has had some anxiety post MI, and was put on an anti-anxiety medication. He denies a history of depression. He has not had any cardiac symptoms. He had about a 2 month history of chest pain/tightness with walking. He has been walking 3 to 5 miles per day since hospital discharge. PT has been tired. Instructed PT to follow cardiac rehab guidelines. HR at 85% of APMHR with 6 minute walk test. PT will see NP tomorrow. Skilled therapy needed to safely progress exercise levels while monitoring HR and BP and work with PT to increase confidence to exercise independently. Therapy needed to work with PT on weight loss goals of 1 to 2 lbs per week. PT appears motivated to work on weight loss and exercise goals. 7/3/2017 ITP forwarded for medical director's review. PT has only attended 2 sessions. No change in ITP plan.   General Information   Treatment Diagnosis NSTEMI   Date of Treatment Diagnosis 06/14/17   Secondary Treatment Diagnosis Stent   Significant Past CV History None   Comorbidities None   Other Medical History Illiostomy   Lead up symptoms SOB, slurred speech   [facility]   Hospital Discharge Date 06/16/17   Signs and Symptoms Post Hospital Discharge SOB  (PT may be \"overdoing it\")   Outpatient Cardiac Rehab Start Date 06/29/17   Primary Physician Dr. Oseas Correa   Primary Physician Follow Up Completed   Cardiologist Dr. Simon   Cardiologist Follow Up Scheduled   Ejection Fraction 60-65% "   Risk Stratification Moderate   Summary of Cath Report   Summary of Cath Report Available   Date Performed 06/14/17   RCA 95% to 40% stenosis  (ADELFO)   Living and Work Status    Living Arrangements and Social Status spouse   Support System Live with an adult   Return to Employment Yes   Occupation Computer work   Preventative Medications   CMS recommended medications Ace inhibitors;Antiplatelets;Beta Blocker;Lipid Lowering;Influenza vaccination   Falls Screen   Have you fallen two or more times in the past year? No   Have you fallen and had an injury in the past year? No   Pain   Patient Currently in Pain No   Physical Assessments   Incisions Not applicable   Edema +1 Trace   Right Lung Sounds not assessed   Left Lung Sounds not assessed   Comments wrist site sore from angiogram   Individualized Treatment Plan   Monitored Sessions Scheduled 18   Monitored Sessions Attended 1   Oxygen   Supplemental Oxygen needed No   Nutrition Management - Weight Management   Assessment Initial Assessment   Age 57   Initial Rate Your Plate Score. Dietary tool to assess eating patterns. Scores range from 24 to 72. The higher the score the healthier the eating pattern. 58   Nutrition Management - Lipids   Lipids Labs Available   Date 06/14/17   Total Cholesterol 148   Triglycerides 212   HDL 48   LDL 58   Prescribed Lipid Medication Yes   Statin Intensity High Intensity   Nutrition Management - Diabetes   Diabetes No   Nutrition Management Summary   Dietary Recommendations Low Fat;Low Cholesterol;Low Sodium   Stages of Change for Diet Compliance Preparation   Interventions Planned Attend Nutrition Education Class(es);Refer for Individual Diet Consultation;Complete Food Diary;Instruct on Label Reading;Educate on Weight Management Principles;Educate on Benefits of Exercise   Patient Goals Goal #1   Goal #1 Description PT would like to work on weight loss of 1 to 2 lbs per week. His short term goal is 180 lbs and long term goal is 168  "lbs.    Goal #1 Target Date 08/29/17   Nutrition Target Outcome Weight loss .5-1 lb/week (if BMI > 25)   Psychosocial Management   Psychosocial Assessment Initial   Is there history of clinical depression or increased risk of depression? No previous history  (Anxiety)   Current Level of Stress per Patient Report Moderate   (work related)   Current Coping Skills Is on Medication for Depression/Anxiety;Uses Stress Management/Relaxation Techniques  (walking and deep breathing)   Initial Patient Health Questionnaire -9 Score (PHQ-9) for depression. 5-9 Minimal symptoms, 10-14 Minor depression, 15-19 Major depression, moderately severe, > 20 Major depression, severe  17   Initial [facility] Survey score.  Quality of Life:   If total score > 25 review individual areas where patient rated a 4 or 5.  Consider patients current medical condition and what role that plays on the score.   Adjust treatment protocol to improve areas of concern.  Consider the following:  PHQ9 score, DASI, and re-assessment within the next 30 days to assist with developing treatments.  1   Stages of Change Action   Interventions Planned Patient to verbalize understanding of behavioral assessment results;Patient to verbalize understanding of negative impact of stress to personal health;Patient will recognize signs and symptoms of depression;Patient interested in implementing one strategy to reduce current level of stress/anxiety;Patient to attend stress management class(es)   Interventions In Progress or Completed Patient is able to identify positive support system  (wife is supportive)   Patient Goal No   Psychosocial Comments No history of depression. PT states that he does have anxiety at times. He is a \"type A\" and likes things a certain way. /   Psychosocial Target Outcome Identify absence or presence of depression using valid screening tool;Maximize coping skills   Other Core Components - Hypertension   History of or Diagnosis of " Hypertension Yes   Currently taking Anti-Hypertensives Yes;Beta blocker;Ace Inhibitor   Other Core Components - Tobacco   History of Tobacco Use Never   Other Core Components Summary   Interventions Planned None   Activity/Exercise History   Activity/Exercise Assessment Initial   Activity/Exercise Status prior to event? Was Physically Active   Number of Days Currently participating in Moderate Physical Activity? 2   Number of Days Currently performing  Aerobic Exercise (including rehab)? Biking or jog/walk one day per week, more sportic  (1)   Number of Minutes per Session Currently of Aerobic Exercise (average)? 1 hour   Current Stage of Change (Physical Activity) Preparation   Current Stage of Change (Aerobic Exercise) Preparation   Patient Goals Goal #1   Goal #1 Description PT will attend cardiac rehab 2 to 3 times per week to increase confidence with goal of adding jogging before cardiac rehab is complete. PT would like to run a 5K in a year.    Goal #1 Target Date 07/29/17   Activity/Exercise Comments PT ran 2-5K's this year. He would like to run another next year.    Activity/Exercise Target Outcome An Accumulation of 150  Minutes of Aerobic Activity per Week   Exercise Assessment   6 Minute Walk Predicted - Gender Selection Male   Initial 6 Minute Walk Distance (Feet) 1923 ft   Resting HR 74 bpm   Exercise  bpm   Post Exercise HR 89 bpm   Resting /70   Exercise /70   Post Exercise /64   Current MET Level 3.8   MET Level Goal 5.5 to 6.5   ECG Rhythm Sinus rhythm   Ectopy None   Current Symptoms Denies symptoms   Limitations/Restrictions None   Exercise Prescription   Mode Treadmill;Airdyne;Nustep;Elliptical;Weights   Duration/Time 30-45 min   Frequency 2 days/week   THR (85% of age predicted max HR) 138.55   OMNI Effort Rating (0-10 Scale) 4-6/10   Progression Continuous bouts;Total exercise time of 30-45 minutes;Aerobic exercise to OMNI rating of 6 or below and at or below  THR;Progress peak intensity by 1/2 MET per week   Recommended Home Exercise   Type of Exercise Walking;Treadmill   Frequency (days per week) on days off from rehab.    Duration (minutes per session) 15-30 min   Effort Rating Recommended 4-6/10   30 Day Exercise Plan PT to exercise on days off from rehab to increase to 3 miles by the end of 30 days.    Current Home Exercise   Type of Exercise Walking  (PT has a TM and weights at home)   Frequency (days per week) daily   Duration (minutes per session) 3 to 5 miles  (instructed PT to walk 20 to 25 minutes until rehab increases)   Follow-up/On-going Support   Provider follow-up needed on the following Heart Rate  (HR at 85% of APMHR with 6 minute walk test)   Learning Assessment   Learner Patient   Primary Language English   Preferred Learning Style Listening;Reading;Demonstration   Barriers to Learning No barriers noted   Patient Education   Education recommended Anatomy and Physiology of the Heart;Blood Pressure;Exercise Principles;Medication Overview;Nutrition;Stress Management   Education Comments PT is not sure he will be able to attend classes due to work schedule.

## 2017-07-05 ENCOUNTER — CARE COORDINATION (OUTPATIENT)
Dept: CARDIOLOGY | Facility: CLINIC | Age: 57
End: 2017-07-05

## 2017-07-05 ENCOUNTER — HOSPITAL ENCOUNTER (OUTPATIENT)
Dept: CARDIAC REHAB | Facility: CLINIC | Age: 57
End: 2017-07-05
Attending: INTERNAL MEDICINE
Payer: COMMERCIAL

## 2017-07-05 PROCEDURE — 40000116 ZZH STATISTIC OP CR VISIT

## 2017-07-05 PROCEDURE — 40000575 ZZH STATISTIC OP CARDIAC VISIT #2

## 2017-07-05 PROCEDURE — 93797 PHYS/QHP OP CAR RHAB WO ECG: CPT

## 2017-07-05 PROCEDURE — 93798 PHYS/QHP OP CAR RHAB W/ECG: CPT

## 2017-07-05 NOTE — PROGRESS NOTES
[place] short term disability form completed and signed by Anette Forbes. Form mailed to patient today that requires his signature and spouse was notified of this. Copy of form made and put in Dr. Tirado's mailbox. [place]

## 2017-07-07 ENCOUNTER — HOSPITAL ENCOUNTER (OUTPATIENT)
Dept: CARDIAC REHAB | Facility: CLINIC | Age: 57
End: 2017-07-07
Attending: INTERNAL MEDICINE
Payer: COMMERCIAL

## 2017-07-07 PROCEDURE — 93798 PHYS/QHP OP CAR RHAB W/ECG: CPT

## 2017-07-07 PROCEDURE — 40000116 ZZH STATISTIC OP CR VISIT

## 2017-07-10 ENCOUNTER — HOSPITAL ENCOUNTER (OUTPATIENT)
Dept: CARDIAC REHAB | Facility: CLINIC | Age: 57
End: 2017-07-10
Attending: INTERNAL MEDICINE
Payer: COMMERCIAL

## 2017-07-10 VITALS — HEIGHT: 67 IN | WEIGHT: 256.4 LBS | BODY MASS INDEX: 40.24 KG/M2

## 2017-07-10 PROCEDURE — 93798 PHYS/QHP OP CAR RHAB W/ECG: CPT | Performed by: REHABILITATION PRACTITIONER

## 2017-07-10 PROCEDURE — 40000116 ZZH STATISTIC OP CR VISIT: Performed by: REHABILITATION PRACTITIONER

## 2017-07-10 PROCEDURE — 93797 PHYS/QHP OP CAR RHAB WO ECG: CPT | Mod: 59 | Performed by: REHABILITATION PRACTITIONER

## 2017-07-10 PROCEDURE — 40000575 ZZH STATISTIC OP CARDIAC VISIT #2: Performed by: REHABILITATION PRACTITIONER

## 2017-07-10 ASSESSMENT — 6 MINUTE WALK TEST (6MWT)
GENDER SELECTION: MALE
FEMALE CALC: 1409.66
PREDICTED: 1610.92
MALE CALC: 1601.15
TOTAL DISTANCE WALKED (FT): 1923

## 2017-07-10 NOTE — PROGRESS NOTES
07/10/17 Beryl Uribe  1960  NSTEMI/Stent    Session  Physician cosignature/electronic signature indicates approval of this ITP document. I have established, reviewed and made necessary changes to the individualized treatment plan and exercise prescription for this patient.       Session 60 Day Individualized Treatment Plan   Certified through this date 09/02/17   Cardiac Rehab Assessment   Cardiac Rehab Assessment 6/29/17 PT seen for intial evaluation. PT is post NSTEMI and stent of RCA on 6/14/17. He has had some anxiety post MI, and was put on an anti-anxiety medication. He denies a history of depression. He has not had any cardiac symptoms. He had about a 2 month history of chest pain/tightness with walking. He has been walking 3 to 5 miles per day since hospital discharge. PT has been tired. Instructed PT to follow cardiac rehab guidelines. HR at 85% of APMHR with 6 minute walk test. PT will see NP tomorrow. Skilled therapy needed to safely progress exercise levels while monitoring HR and BP and work with PT to increase confidence to exercise independently. Therapy needed to work with PT on weight loss goals of 1 to 2 lbs per week. PT appears motivated to work on weight loss and exercise goals. 7/10/17 Progress update done today. He feels recovery is going well. He has been attending education classes and is finding them to be helpful. He is working on cutting carbs out of his diet and increasing fruits and vegetables. He will meet 1:1 with the . He does feel anxiety is improved. Skilled therapy needed to continue to safely progress exercise levels while monitoring HR and BP as well as provide education to decrease weight and increase confiendence to exercise independently.    General Information   Treatment Diagnosis NSTEMI   Date of Treatment Diagnosis 06/14/17   Secondary Treatment Diagnosis Stent   Significant Past CV History None   Comorbidities None   Other Medical History Illiostomy  "  Lead up symptoms SOB, slurred speech   [facility] Location [facility]   Hospital Discharge Date 06/16/17   Signs and Symptoms Post Hospital Discharge SOB  (PT may be \"overdoing it\")   Outpatient Cardiac Rehab Start Date 06/29/17   Primary Physician Dr. Oseas Correa   Primary Physician Follow Up Completed   Cardiologist Dr. Simon   Cardiologist Follow Up Scheduled   Ejection Fraction 60-65%   Risk Stratification Moderate   Summary of Cath Report   Summary of Cath Report Available   Date Performed 06/14/17   RCA 95% to 40% stenosis  (ADELFO)   Living and Work Status    Living Arrangements and Social Status spouse   Support System Live with an adult   Return to Employment Yes   Occupation Computer work   Preventative Medications   CMS recommended medications Ace inhibitors;Antiplatelets;Beta Blocker;Lipid Lowering;Influenza vaccination   Falls Screen   Have you fallen two or more times in the past year? No   Have you fallen and had an injury in the past year? No   Pain   Patient Currently in Pain No   Physical Assessments   Incisions Not applicable   Edema +1 Trace   Right Lung Sounds not assessed   Left Lung Sounds not assessed   Comments wrist site sore from angiogram   Individualized Treatment Plan   Monitored Sessions Scheduled 18   Monitored Sessions Attended 5   Oxygen   Supplemental Oxygen needed No   Nutrition Management - Weight Management   Assessment Re-assessment   Age 57   Weight 116.3 kg (256 lb 6.4 oz)   Height 1.702 m (5' 7.01\")   BMI (Calculated) 40.23   Initial Rate Your Plate Score. Dietary tool to assess eating patterns. Scores range from 24 to 72. The higher the score the healthier the eating pattern. 58   Weight Management Comments PT has been working to decrease carbs and increase fruits and vegetables.    Nutrition Management - Lipids   Lipids Labs Available   Date 06/14/17   Total Cholesterol 148   Triglycerides 212   HDL 48   LDL 58   Prescribed Lipid Medication Yes   Statin Intensity High Intensity "   Nutrition Management - Diabetes   Diabetes No   Nutrition Management Summary   Dietary Recommendations Low Fat;Low Cholesterol;Low Sodium   Stages of Change for Diet Compliance Preparation   Interventions Planned Attend Nutrition Education Class(es);Refer for Individual Diet Consultation;Complete Food Diary;Instruct on Label Reading;Educate on Weight Management Principles;Educate on Benefits of Exercise   Patient Goals Goal #1   Goal #1 Description PT would like to work on weight loss of 1 to 2 lbs per week. His short term goal is 180 lbs and long term goal is 168 lbs.    Goal #1 Target Date 08/29/17   Goal #1 Progress Towards Goal 7/101/17 PT has lost 2 lbs since starting rehab. He will attend nutrition classes as offered, and may consider a 1:1 with dietician after attending classes.    Nutrition Target Outcome Weight loss .5-1 lb/week (if BMI > 25)   Psychosocial Management   Psychosocial Assessment Re-assessment   Is there history of clinical depression or increased risk of depression? No previous history  (Anxiety)   Current Level of Stress per Patient Report Moderate   (work related)   Current Coping Skills Is on Medication for Depression/Anxiety;Uses Stress Management/Relaxation Techniques  (walking and deep breathing)   Initial Patient Health Questionnaire -9 Score (PHQ-9) for depression. 5-9 Minimal symptoms, 10-14 Minor depression, 15-19 Major depression, moderately severe, > 20 Major depression, severe  17   Initial [facility] Survey score.  Quality of Life:   If total score > 25 review individual areas where patient rated a 4 or 5.  Consider patients current medical condition and what role that plays on the score.   Adjust treatment protocol to improve areas of concern.  Consider the following:  PHQ9 score, DASI, and re-assessment within the next 30 days to assist with developing treatments.  1   Stages of Change Action   Interventions Planned Patient to verbalize understanding of behavioral  "assessment results;Patient to verbalize understanding of negative impact of stress to personal health;Patient will recognize signs and symptoms of depression;Patient interested in implementing one strategy to reduce current level of stress/anxiety;Patient to attend stress management class(es)   Interventions In Progress or Completed Patient is able to identify positive support system;Patient verbalizes understanding of negative impact of stress to personal health;Pt recognizes signs and symptoms of depression;Patient was referred for 1:1 behavioral consult/chapliancy;Patient attended stress management class(es)  (wife is supportive, PT to meet 1:1 with )   Patient Goal No   Psychosocial Comments No history of depression. PT states that he does have anxiety at times. He is a \"type A\" and likes things a certain way. /   Psychosocial Target Outcome Identify absence or presence of depression using valid screening tool;Maximize coping skills   Other Core Components - Hypertension   History of or Diagnosis of Hypertension Yes   Currently taking Anti-Hypertensives Yes;Beta blocker;Ace Inhibitor   Other Core Components - Tobacco   History of Tobacco Use Never   Other Core Components Summary   Interventions Planned None   Activity/Exercise History   Activity/Exercise Assessment Re-assessment   Activity/Exercise Status prior to event? Was Physically Active   Number of Days Currently participating in Moderate Physical Activity? 2   Number of Days Currently performing  Aerobic Exercise (including rehab)? daily   Number of Minutes per Session Currently of Aerobic Exercise (average)? 30 to 50 minutes   Current Stage of Change (Physical Activity) Action   Current Stage of Change (Aerobic Exercise) Maintenance   Patient Goals Goal #1   Goal #1 Description PT will attend cardiac rehab 2 to 3 times per week to increase confidence with goal of adding jogging before cardiac rehab is complete. PT would like to run a 5K in a " year.    Goal #1 Target Date 07/29/17   Goal #1 Progress Towards Goal 7/10/17 PT has attended rehab 5 times. He is walking for 3 miles on days off from rehab.    Activity/Exercise Comments PT ran 2-5K's this year. He would like to run another next year.    Activity/Exercise Target Outcome An Accumulation of 150  Minutes of Aerobic Activity per Week   Exercise Assessment   6 Minute Walk Predicted - Gender Selection Male   6 Minute Walk Predicted (Male) 1601.15   6 Minute Walk Predicted (Female) 1409.66   Initial 6 Minute Walk Distance (Feet) 1923 ft   Resting HR 70 bpm   Exercise  bpm   Post Exercise HR 80 bpm   Resting /80   Exercise /88   Current MET Level 4.1   MET Level Goal 5.5 to 6.5   ECG Rhythm Sinus rhythm   Ectopy None   Current Symptoms Denies symptoms   Limitations/Restrictions None   Exercise Prescription   Mode Treadmill;Airdyne;Nustep;Elliptical;Weights   Duration/Time 30-45 min   Frequency 2 days/week   THR (85% of age predicted max HR) 138.55   OMNI Effort Rating (0-10 Scale) 4-6/10   Progression Continuous bouts;Total exercise time of 30-45 minutes;Aerobic exercise to OMNI rating of 6 or below and at or below THR;Progress peak intensity by 1/2 MET per week   Recommended Home Exercise   Type of Exercise Walking;Treadmill   Frequency (days per week) on days off from rehab.    Duration (minutes per session) 15-30 min   Effort Rating Recommended 4-6/10   30 Day Exercise Plan PT to exercise on days off from rehab to increase to 3 miles by the end of 30 days.    Current Home Exercise   Type of Exercise Walking  (PT has a TM and weights at home)   Frequency (days per week) daily   Duration (minutes per session) 3 to 5 miles  (instructed PT to walk 20 to 25 minutes until rehab increases)   Follow-up/On-going Support   Provider follow-up needed on the following No follow-up needed  (HR at 85% of APMHR with 6 minute walk test)   Learning Assessment   Learner Patient   Primary Language  English   Preferred Learning Style Listening;Reading;Demonstration   Barriers to Learning No barriers noted   Patient Education   Education recommended Anatomy and Physiology of the Heart;Blood Pressure;Exercise Principles;Medication Overview;Nutrition;Stress Management   Education classes attended Anatomy and Physiology of the Heart;Stress Management   Education Comments PT is not sure he will be able to attend classes due to work schedule.

## 2017-07-12 ENCOUNTER — HOSPITAL ENCOUNTER (OUTPATIENT)
Dept: CARDIAC REHAB | Facility: CLINIC | Age: 57
End: 2017-07-12
Attending: INTERNAL MEDICINE
Payer: COMMERCIAL

## 2017-07-12 PROCEDURE — 40000575 ZZH STATISTIC OP CARDIAC VISIT #2: Performed by: REHABILITATION PRACTITIONER

## 2017-07-12 PROCEDURE — 93797 PHYS/QHP OP CAR RHAB WO ECG: CPT | Performed by: REHABILITATION PRACTITIONER

## 2017-07-12 PROCEDURE — 93798 PHYS/QHP OP CAR RHAB W/ECG: CPT | Performed by: REHABILITATION PRACTITIONER

## 2017-07-12 PROCEDURE — 40000116 ZZH STATISTIC OP CR VISIT: Performed by: REHABILITATION PRACTITIONER

## 2017-07-13 ENCOUNTER — HOSPITAL ENCOUNTER (OUTPATIENT)
Dept: CARDIAC REHAB | Facility: CLINIC | Age: 57
End: 2017-07-13
Attending: INTERNAL MEDICINE
Payer: COMMERCIAL

## 2017-07-13 PROCEDURE — 40000116 ZZH STATISTIC OP CR VISIT: Performed by: REHABILITATION PRACTITIONER

## 2017-07-13 PROCEDURE — 93798 PHYS/QHP OP CAR RHAB W/ECG: CPT | Performed by: REHABILITATION PRACTITIONER

## 2017-07-18 ENCOUNTER — HOSPITAL ENCOUNTER (OUTPATIENT)
Dept: CARDIAC REHAB | Facility: CLINIC | Age: 57
End: 2017-07-18
Attending: INTERNAL MEDICINE
Payer: COMMERCIAL

## 2017-07-18 PROCEDURE — 40000116 ZZH STATISTIC OP CR VISIT: Performed by: REHABILITATION PRACTITIONER

## 2017-07-18 PROCEDURE — 93798 PHYS/QHP OP CAR RHAB W/ECG: CPT | Performed by: REHABILITATION PRACTITIONER

## 2017-07-19 ENCOUNTER — HOSPITAL ENCOUNTER (OUTPATIENT)
Dept: CARDIAC REHAB | Facility: CLINIC | Age: 57
End: 2017-07-19
Attending: INTERNAL MEDICINE
Payer: COMMERCIAL

## 2017-07-19 PROCEDURE — 93797 PHYS/QHP OP CAR RHAB WO ECG: CPT | Mod: 59 | Performed by: REHABILITATION PRACTITIONER

## 2017-07-19 PROCEDURE — 40000116 ZZH STATISTIC OP CR VISIT: Performed by: REHABILITATION PRACTITIONER

## 2017-07-19 PROCEDURE — 40000575 ZZH STATISTIC OP CARDIAC VISIT #2: Performed by: REHABILITATION PRACTITIONER

## 2017-07-19 PROCEDURE — 93798 PHYS/QHP OP CAR RHAB W/ECG: CPT | Performed by: REHABILITATION PRACTITIONER

## 2017-07-21 ENCOUNTER — HOSPITAL ENCOUNTER (OUTPATIENT)
Dept: CARDIAC REHAB | Facility: CLINIC | Age: 57
End: 2017-07-21
Attending: INTERNAL MEDICINE
Payer: COMMERCIAL

## 2017-07-21 PROCEDURE — 93798 PHYS/QHP OP CAR RHAB W/ECG: CPT | Performed by: OCCUPATIONAL THERAPIST

## 2017-07-21 PROCEDURE — 40000116 ZZH STATISTIC OP CR VISIT: Performed by: OCCUPATIONAL THERAPIST

## 2017-07-24 ENCOUNTER — HOSPITAL ENCOUNTER (OUTPATIENT)
Dept: CARDIAC REHAB | Facility: CLINIC | Age: 57
End: 2017-07-24
Attending: INTERNAL MEDICINE
Payer: COMMERCIAL

## 2017-07-24 PROCEDURE — 40000116 ZZH STATISTIC OP CR VISIT: Performed by: OCCUPATIONAL THERAPIST

## 2017-07-24 PROCEDURE — 93798 PHYS/QHP OP CAR RHAB W/ECG: CPT | Performed by: OCCUPATIONAL THERAPIST

## 2017-07-26 ENCOUNTER — HOSPITAL ENCOUNTER (OUTPATIENT)
Dept: CARDIAC REHAB | Facility: CLINIC | Age: 57
End: 2017-07-26
Attending: INTERNAL MEDICINE
Payer: COMMERCIAL

## 2017-07-26 PROCEDURE — 93797 PHYS/QHP OP CAR RHAB WO ECG: CPT | Performed by: REHABILITATION PRACTITIONER

## 2017-07-26 PROCEDURE — 40000116 ZZH STATISTIC OP CR VISIT: Performed by: REHABILITATION PRACTITIONER

## 2017-07-26 PROCEDURE — 40000575 ZZH STATISTIC OP CARDIAC VISIT #2: Performed by: REHABILITATION PRACTITIONER

## 2017-07-26 PROCEDURE — 93798 PHYS/QHP OP CAR RHAB W/ECG: CPT | Performed by: REHABILITATION PRACTITIONER

## 2017-07-28 ENCOUNTER — HOSPITAL ENCOUNTER (OUTPATIENT)
Dept: CARDIAC REHAB | Facility: CLINIC | Age: 57
End: 2017-07-28
Attending: INTERNAL MEDICINE
Payer: COMMERCIAL

## 2017-07-28 VITALS — WEIGHT: 252.2 LBS | HEIGHT: 67 IN | BODY MASS INDEX: 39.58 KG/M2

## 2017-07-28 PROCEDURE — 93798 PHYS/QHP OP CAR RHAB W/ECG: CPT | Performed by: REHABILITATION PRACTITIONER

## 2017-07-28 PROCEDURE — 40000116 ZZH STATISTIC OP CR VISIT: Performed by: REHABILITATION PRACTITIONER

## 2017-07-28 ASSESSMENT — 6 MINUTE WALK TEST (6MWT)
PREDICTED: 1622
MALE CALC: 1612.17
TOTAL DISTANCE WALKED (FT): 1923
FEMALE CALC: 1424
GENDER SELECTION: MALE

## 2017-07-28 NOTE — PROGRESS NOTES
07/28/17 1500   Session  Darryl Mccartyck  1960  NSTEMI/Stent   Session 90 Day Individualized Treatment Plan  (1:1 session)   Certified through this date 09/02/17   Cardiac Rehab Assessment    Physician cosignature/electronic signature indicates approval of this ITP document. I have established, reviewed and made necessary changes to the individualized treatment plan and exercise prescription for this patient.   Cardiac Rehab Assessment 6/29/17 PT seen for initial evaluation. PT is post NSTEMI and stent of RCA on 6/14/17. He has had some anxiety post MI, and was put on an anti-anxiety medication. He denies a history of depression. He has not had any cardiac symptoms. He had about a 2 month history of chest pain/tightness with walking. He has been walking 3 to 5 miles per day since hospital discharge. PT has been tired. Instructed PT to follow cardiac rehab guidelines. HR at 85% of APMHR with 6 minute walk test. PT will see NP tomorrow. Skilled therapy needed to safely progress exercise levels while monitoring HR and BP and work with PT to increase confidence to exercise independently. Therapy needed to work with PT on weight loss goals of 1 to 2 lbs per week. PT appears motivated to work on weight loss and exercise goals. 7/10/17 Progress update done today. He feels recovery is going well. He has been attending education classes and is finding them to be helpful. He is working on cutting carbs out of his diet and increasing fruits and vegetables. He will meet 1:1 with the . He does feel anxiety is improved. 1:1 done today. PT is making steady progress with rehab. He has attended all education classes offered. He is working on reducing the stress levels at work. He is working on weight loss. Reviewed the healing progress, risk factors, goals and progress with PT. PT given a food log and fat gram budget. Discussed home exercise guidelines. Skilled therapy needed to continue to safely progress exercise levels  "while monitoring HR and BP as well as provide education to decrease weight and increase confidence to exercise independently.    General Information   Treatment Diagnosis NSTEMI   Date of Treatment Diagnosis 06/14/17   Secondary Treatment Diagnosis Stent   Significant Past CV History None   Comorbidities None   Other Medical History Illiostomy   Lead up symptoms SOB, slurred speech   Hospital [facility]   Hospital Discharge Date 06/16/17   Signs and Symptoms Post Hospital Discharge SOB  (PT may be \"overdoing it\")   Outpatient Cardiac Rehab Start Date 06/29/17   Primary Physician Dr. Oseas Correa   Primary Physician Follow Up Completed   Cardiologist Dr. Simon   Cardiologist Follow Up Scheduled   Ejection Fraction 60-65%   Risk Stratification Moderate   Summary of Cath Report   Summary of Cath Report Available   Date Performed 06/14/17   RCA 95% to 40% stenosis  ([facility])   Living and Work Status    Living Arrangements and Social Status spouse   Support System Live with an adult   Return to Employment Yes   Occupation Computer work   Preventative Medications   CMS recommended medications Ace inhibitors;Antiplatelets;Beta Blocker;Lipid Lowering;Influenza vaccination   Falls Screen   Have you fallen two or more times in the past year? No   Have you fallen and had an injury in the past year? No   Pain   Patient Currently in Pain No   Physical Assessments   Incisions Not applicable   Edema +1 Trace   Right Lung Sounds not assessed   Left Lung Sounds not assessed   Comments wrist site sore from angiogram   Individualized Treatment Plan   Monitored Sessions Scheduled 24   Monitored Sessions Attended 13   Oxygen   Supplemental Oxygen needed No   Nutrition Management - Weight Management   Assessment Re-assessment   Age 57   Weight 114.4 kg (252 lb 3.2 oz)   Height 1.702 m (5' 7.01\")   BMI (Calculated) 39.57   Initial Rate Your Plate Score. Dietary tool to assess eating patterns. Scores range from 24 to 72. The higher the " score the healthier the eating pattern. 58   Weight Management Comments PT has been working to decrease carbs and increase fruits and vegetables. PT initially lost a couple of lbs, but recently has gained back what he lost. Discussed caloric intake. PT given a food log and fat gram budget. He has increased fruits and vegetables, and is overall trying to eat less fat and sodium.    Nutrition Management - Lipids   Lipids Labs Available   Date 06/14/17   Total Cholesterol 148   Triglycerides 212   HDL 48   LDL 58   Prescribed Lipid Medication Yes   Statin Intensity High Intensity   Nutrition Management - Diabetes   Diabetes No   Nutrition Management Summary   Dietary Recommendations Low Fat;Low Cholesterol;Low Sodium   Stages of Change for Diet Compliance Preparation   Interventions Planned Attend Nutrition Education Class(es);Refer for Individual Diet Consultation;Complete Food Diary;Instruct on Label Reading;Educate on Weight Management Principles;Educate on Benefits of Exercise   Interventions In Progress or Completed Attended Nutrition Class(es);Understands how to accurately read Food Labels;Understands Weight Management Principles;Educated on Benefits of Exercise   Patient Goals Goal #1   Goal #1 Description PT would like to work on weight loss of 1 to 2 lbs per week. His short term goal is 180 lbs and long term goal is 168 lbs.    Goal #1 Target Date 08/29/17   Goal #1 Progress Towards Goal 7/101/17 PT has lost 2 lbs since starting rehab. He will attend nutrition classes as offered, and may consider a 1:1 with dietician after attending classes. 7/28/17 PT has attended nutrition classes. He was given food log and fat gram budget today. He will complete food log, and will consider 1:1 with dietician if he feels he needs additional education.    Nutrition Target Outcome Weight loss .5-1 lb/week (if BMI > 25)   Psychosocial Management   Psychosocial Assessment Re-assessment   Is there history of clinical depression  "or increased risk of depression? No previous history  (Anxiety)   Current Level of Stress per Patient Report Moderate   (work related)   Current Coping Skills Is on Medication for Depression/Anxiety;Uses Stress Management/Relaxation Techniques  (walking and deep breathing)   Initial Patient Health Questionnaire -9 Score (PHQ-9) for depression. 5-9 Minimal symptoms, 10-14 Minor depression, 15-19 Major depression, moderately severe, > 20 Major depression, severe  17   Initial [facility] Survey score.  Quality of Life:   If total score > 25 review individual areas where patient rated a 4 or 5.  Consider patients current medical condition and what role that plays on the score.   Adjust treatment protocol to improve areas of concern.  Consider the following:  PHQ9 score, DASI, and re-assessment within the next 30 days to assist with developing treatments.  1   Stages of Change Action   Interventions Planned Patient to verbalize understanding of behavioral assessment results;Patient to verbalize understanding of negative impact of stress to personal health;Patient will recognize signs and symptoms of depression;Patient interested in implementing one strategy to reduce current level of stress/anxiety;Patient to attend stress management class(es)   Interventions In Progress or Completed Patient is able to identify positive support system;Patient verbalizes understanding of negative impact of stress to personal health;Pt recognizes signs and symptoms of depression;Patient was referred for 1:1 behavioral consult/chapliancy;Patient attended stress management class(es)  (wife is supportive, PT to meet 1:1 with )   Patient Goal No   Psychosocial Comments No history of depression. PT states that he does have anxiety at times. He is a \"type A\" and likes things a certain way. PT thinking about how he can reduce stress at work    Psychosocial Target Outcome Identify absence or presence of depression using valid screening " tool;Maximize coping skills   Other Core Components - Hypertension   History of or Diagnosis of Hypertension Yes   Currently taking Anti-Hypertensives Yes;Beta blocker;Ace Inhibitor   Hypertension Comments BP exaggerated with exercise. Will continue to assess.    Other Core Components - Tobacco   History of Tobacco Use Never   Other Core Components Summary   Interventions Planned None   Activity/Exercise History   Activity/Exercise Assessment Re-assessment   Activity/Exercise Status prior to event? Was Physically Active   Number of Days Currently participating in Moderate Physical Activity? 3   Number of Days Currently performing  Aerobic Exercise (including rehab)? daily   Number of Minutes per Session Currently of Aerobic Exercise (average)? 30 to 50 minutes   Current Stage of Change (Physical Activity) Action   Current Stage of Change (Aerobic Exercise) Maintenance   Patient Goals Goal #1   Goal #1 Description PT will attend cardiac rehab 2 to 3 times per week to increase confidence with goal of adding jogging before cardiac rehab is complete. PT would like to run a 5K in a year.    Goal #1 Target Date 07/29/17   Goal #1 Progress Towards Goal 7/10/17 PT has attended rehab 5 times. He is walking for 3 miles on days off from rehab. He is making nice progress with rehab, and is currently at a 5.4 MET level   Activity/Exercise Comments PT ran 2-5K's this year. He would like to run another next year.    Activity/Exercise Target Outcome An Accumulation of 150  Minutes of Aerobic Activity per Week   Exercise Assessment   6 Minute Walk Predicted - Gender Selection Male   6 Minute Walk Predicted (Male) 1612.17   6 Minute Walk Predicted (Female) 1424   Initial 6 Minute Walk Distance (Feet) 1923 ft   Resting HR 63 bpm   Exercise  bpm   Post Exercise HR 81 bpm   Resting /74   Exercise /74   Post Exercise /70   Current MET Level 5.4   MET Level Goal 6.0 to 6.5   ECG Rhythm Sinus rhythm   Ectopy None    Current Symptoms Denies symptoms   Limitations/Restrictions None   Exercise Prescription   Mode Treadmill;Airdyne;NuStep;Elliptical;Weights   Duration/Time 30-45 min   Frequency 2 days/week   THR (85% of age predicted max HR) 138.55   OMNI Effort Rating (0-10 Scale) 4-6/10   Progression Continuous bouts;Total exercise time of 30-45 minutes;Aerobic exercise to OMNI rating of 6 or below and at or below THR;Progress peak intensity by 1/2 MET per week   Recommended Home Exercise   Type of Exercise Walking;Treadmill   Frequency (days per week) on days off from rehab.    Duration (minutes per session) 30-45 min   Effort Rating Recommended 4-6/10   30 Day Exercise Plan PT to exercise on days off from rehab to increase to 3 miles by the end of 30 days.    Current Home Exercise   Type of Exercise Walking  (PT has a TM and weights at home)   Frequency (days per week) daily   Duration (minutes per session) 3 to 5 miles  (instructed PT to walk 20 to 25 minutes until rehab increases)   Follow-up/On-going Support   Provider follow-up needed on the following No follow-up needed  (HR at 85% of APMHR with 6 minute walk test)   Comments PT would like to start adding bouts of jogging, will re-assess after BP better controlled with exercise.    Learning Assessment   Learner Patient   Primary Language English   Preferred Learning Style Listening;Reading;Demonstration   Barriers to Learning No barriers noted   Patient Education   Education recommended Anatomy and Physiology of the Heart;Blood Pressure;Exercise Principles;Medication Overview;Nutrition;Stress Management   Education classes attended Anatomy and Physiology of the Heart;Stress Management;Medication Overview;Nutrition

## 2017-07-31 ENCOUNTER — HOSPITAL ENCOUNTER (OUTPATIENT)
Dept: CARDIAC REHAB | Facility: CLINIC | Age: 57
End: 2017-07-31
Attending: INTERNAL MEDICINE
Payer: COMMERCIAL

## 2017-07-31 PROCEDURE — 40000116 ZZH STATISTIC OP CR VISIT: Performed by: OCCUPATIONAL THERAPIST

## 2017-07-31 PROCEDURE — 40000575 ZZH STATISTIC OP CARDIAC VISIT #2: Performed by: OCCUPATIONAL THERAPIST

## 2017-07-31 PROCEDURE — 93798 PHYS/QHP OP CAR RHAB W/ECG: CPT | Performed by: OCCUPATIONAL THERAPIST

## 2017-07-31 PROCEDURE — 93797 PHYS/QHP OP CAR RHAB WO ECG: CPT | Mod: 59 | Performed by: OCCUPATIONAL THERAPIST

## 2017-08-02 ENCOUNTER — HOSPITAL ENCOUNTER (OUTPATIENT)
Dept: CARDIAC REHAB | Facility: CLINIC | Age: 57
End: 2017-08-02
Attending: INTERNAL MEDICINE
Payer: COMMERCIAL

## 2017-08-02 PROCEDURE — 93798 PHYS/QHP OP CAR RHAB W/ECG: CPT

## 2017-08-02 PROCEDURE — 40000116 ZZH STATISTIC OP CR VISIT

## 2017-08-04 ENCOUNTER — HOSPITAL ENCOUNTER (OUTPATIENT)
Dept: CARDIAC REHAB | Facility: CLINIC | Age: 57
End: 2017-08-04
Attending: INTERNAL MEDICINE
Payer: COMMERCIAL

## 2017-08-04 PROCEDURE — 40000116 ZZH STATISTIC OP CR VISIT

## 2017-08-04 PROCEDURE — 93798 PHYS/QHP OP CAR RHAB W/ECG: CPT

## 2017-08-07 ENCOUNTER — HOSPITAL ENCOUNTER (OUTPATIENT)
Dept: CARDIAC REHAB | Facility: CLINIC | Age: 57
End: 2017-08-07
Attending: INTERNAL MEDICINE
Payer: COMMERCIAL

## 2017-08-07 PROCEDURE — 93798 PHYS/QHP OP CAR RHAB W/ECG: CPT | Performed by: OCCUPATIONAL THERAPIST

## 2017-08-07 PROCEDURE — 40000116 ZZH STATISTIC OP CR VISIT: Performed by: OCCUPATIONAL THERAPIST

## 2017-08-09 ENCOUNTER — HOSPITAL ENCOUNTER (OUTPATIENT)
Dept: CARDIAC REHAB | Facility: CLINIC | Age: 57
End: 2017-08-09
Attending: INTERNAL MEDICINE
Payer: COMMERCIAL

## 2017-08-09 PROCEDURE — 40000116 ZZH STATISTIC OP CR VISIT: Performed by: REHABILITATION PRACTITIONER

## 2017-08-09 PROCEDURE — 93798 PHYS/QHP OP CAR RHAB W/ECG: CPT | Performed by: REHABILITATION PRACTITIONER

## 2017-08-10 ENCOUNTER — OFFICE VISIT (OUTPATIENT)
Dept: CARDIOLOGY | Facility: CLINIC | Age: 57
End: 2017-08-10
Payer: COMMERCIAL

## 2017-08-10 VITALS
HEART RATE: 68 BPM | BODY MASS INDEX: 39.93 KG/M2 | WEIGHT: 254.4 LBS | SYSTOLIC BLOOD PRESSURE: 130 MMHG | HEIGHT: 67 IN | DIASTOLIC BLOOD PRESSURE: 76 MMHG

## 2017-08-10 DIAGNOSIS — I21.4 NSTEMI (NON-ST ELEVATED MYOCARDIAL INFARCTION) (H): Primary | ICD-10-CM

## 2017-08-10 DIAGNOSIS — I25.10 CORONARY ARTERY DISEASE INVOLVING NATIVE CORONARY ARTERY OF NATIVE HEART WITHOUT ANGINA PECTORIS: ICD-10-CM

## 2017-08-10 DIAGNOSIS — E66.3 OVER WEIGHT: ICD-10-CM

## 2017-08-10 PROCEDURE — 99214 OFFICE O/P EST MOD 30 MIN: CPT | Performed by: INTERNAL MEDICINE

## 2017-08-10 NOTE — LETTER
8/10/2017    [facility]  [address]  [place] [place] [address]    RE: Darryl KERRI Uribe       Dear Colleague,    I had the pleasure of seeing Darryl Uribe in the [facility].    I saw Darryl Uribe today, who is post-RCA stenting in 2017.  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  He had mild disease of his LAD.  The remaining vessels were relatively healthy and normal.      He has dutifully followed the medication program that we have given him includin.  Atorvastatin 40 mg at bedtime.   2.  Lisinopril 40 mg at bedtime.   3.  Metoprolol tartrate 25 mg b.i.d.   4.  Brilinta 90 mg b.i.d.   5.  Aspirin 81 mg a day.   6.  Effexor 37.5 mg b.i.d.   7.  Omeprazole, calcium and vitamin D.      PHYSICAL EXAMINATION:   GENERAL:  Shows that he remains overweight at 254 pounds.   VITAL SIGNS:  Blood pressure 130/76, heart rate 68 beats a minute.   HEAD:  Normal.   NECK:  Free of neck vein distention or bruits.   HEART:  Regular without gallop or murmur.   LUNGS:  Clear.   ABDOMEN:  Soft without organomegaly.  He is overweight.   EXTREMITIES:  Show +2 pedal pulses, no edema.   NEUROLOGIC AND CUTANEOUS:  Normal.      Darryl is 57.  We discussed at length diet, weight loss and exercise.      I reviewed each of his medicines and their indications with regards to long-term arterial treatment, namely aspirin, statin, beta blocker and ACE inhibitor.  He will take the Brilinta until 2018.  I will see him at that time and likely stop the drug.  Thereafter, we will see what he needs.  For the time being, I was pleased with his seeming good intentions and at least verbally he seemed to committed to maintaining a healthier lifestyle.      IMPRESSION:   1.  Asymptomatic coronary artery disease.   2.  Post-RCA stenting.   3.  Trivial coronary disease otherwise.   4.  Normal LV function.   5.  Treated hyperlipidemia.   6.  Treated hypertension.       PLAN:  As above.      Warm regards.  If you have any questions, concerns or disagreements, give me a call.     Again, thank you for allowing me to participate in the care of your patient.      Sincerely,    CIRA SQUIRES MD     [facility]

## 2017-08-10 NOTE — MR AVS SNAPSHOT
After Visit Summary   8/10/2017    Darryl Uribe    MRN: [id number]           Patient Information     Date Of Birth          1960        Visit Information        Provider Department      8/10/2017 4:45 PM Mike Tirado MD [facility]        Today's Diagnoses     NSTEMI (non-ST elevated myocardial infarction) (H)    -  1    Coronary artery disease involving native coronary artery of native heart without angina pectoris        Over weight           Follow-ups after your visit        Your next 10 appointments already scheduled     Aug 11, 2017  3:00 PM CDT   Cardiac Treatment with Rh Cardiac Rehab 1   [facility]    [address], [address]  [place] [id number] [phone]            Aug 15, 2017  8:15 AM CDT   Discharge Appointment with Rh Cardiac Rehab 1   [facility]    [address], [address]  [place] [id number] [phone]              Who to contact     If you have questions or need follow up information about today's clinic visit or your schedule please contact [facility] directly at [phone].  Normal or non-critical lab and imaging results will be communicated to you by MyChart, letter or phone within 4 business days after the clinic has received the results. If you do not hear from us within 7 days, please contact the clinic through [employer] or phone. If you have a critical or abnormal lab result, we will notify you by phone as soon as possible.  Submit refill requests through [employer] or call your pharmacy and they will forward the refill request to us. Please allow 3 business days for your refill to be completed.          Additional Information About Your Visit        MyChart Information     [employer] gives you secure access to your electronic health record. If you see a primary care  "provider, you can also send messages to your care team and make appointments. If you have questions, please call your primary care clinic.  If you do not have a primary care provider, please call [phone] and they will assist you.        Care EveryWhere ID     This is your Care EveryWhere ID. This could be used by other organizations to access your [place] medical records  [id number]        Your Vitals Were     Pulse Height BMI (Body Mass Index)             68 1.702 m (5' 7\") 39.84 kg/m2          Blood Pressure from Last 3 Encounters:   08/10/17 130/76   06/30/17 120/78   06/16/17 173/82    Weight from Last 3 Encounters:   08/10/17 115.4 kg (254 lb 6.4 oz)   07/28/17 114.4 kg (252 lb 3.2 oz)   07/10/17 116.3 kg (256 lb 6.4 oz)              Today, you had the following     No orders found for display       Primary Care Provider Fax #    [facility] [phone] [address]  [facility] [address]        Equal Access to Services     [facility]: Hadii aad ku hadjedo Glendy, waaxda luqadaha, qaybta kaalmada deborah, ting mcgee . So [facility] [phone].    ATENCIÓN: Si habla español, tiene a kincaid disposición servicios gratuitos de asistencia lingüística. Aldo al [phone].    We comply with applicable federal civil rights laws and [place] laws. We do not discriminate on the basis of race, color, national origin, age, disability sex, sexual orientation or gender identity.            Thank you!     Thank you for choosing [facility] AT [place]  for your care. Our goal is always to provide you with excellent care. Hearing back from our patients is one way we can continue to improve our services. Please take a few minutes to complete the written survey that you may receive in the mail after your visit with us. Thank you!             Your Updated Medication List - Protect others around you: Learn how to safely use, store and " throw away your medicines at www.disposemymeds.org.          This list is accurate as of: 8/10/17  5:22 PM.  Always use your most recent med list.                   Brand Name Dispense Instructions for use Diagnosis    ASPIRIN PO      Take 81 mg by mouth At Bedtime        atorvastatin 40 MG tablet    LIPITOR    30 tablet    Take 1 tablet (40 mg) by mouth daily    NSTEMI (non-ST elevated myocardial infarction) (H)       lisinopril 40 MG tablet    PRINIVIL/ZESTRIL    30 tablet    Take 1 tablet (40 mg) by mouth At Bedtime    NSTEMI (non-ST elevated myocardial infarction) (H)       metoprolol 25 MG tablet    LOPRESSOR    60 tablet    Take 1 tablet (25 mg) by mouth 2 times daily    NSTEMI (non-ST elevated myocardial infarction) (H)       nitroGLYcerin 0.4 MG sublingual tablet    NITROSTAT    25 tablet    For chest pain place 1 tablet under the tongue every 5 minutes for 3 doses. If symptoms persist 5 minutes after 1st dose call 911.    Postsurgical percutaneous transluminal coronary angioplasty status, NSTEMI (non-ST elevated myocardial infarction) (H), Mixed hyperlipidemia       omeprazole 20 MG CR capsule    priLOSEC     Take 20 mg by mouth 2 times daily        ticagrelor 90 MG tablet    BRILINTA    60 tablet    Take 1 tablet (90 mg) by mouth every 12 hours    NSTEMI (non-ST elevated myocardial infarction) (H)       venlafaxine 37.5 MG tablet    EFFEXOR     Take 37.5 mg by mouth 2 times daily        VITAMIN D (CHOLECALCIFEROL) PO      Take 400 Units by mouth daily

## 2017-08-10 NOTE — PROGRESS NOTES
HPI and Plan:   See dictation        No orders of the defined types were placed in this encounter.    No orders of the defined types were placed in this encounter.    Medications Discontinued During This Encounter   Medication Reason     omega 3 1000 MG CAPS Stopped by Patient         No diagnosis found.    CURRENT MEDICATIONS:  Current Outpatient Prescriptions   Medication Sig Dispense Refill     nitroGLYcerin (NITROSTAT) 0.4 MG sublingual tablet For chest pain place 1 tablet under the tongue every 5 minutes for 3 doses. If symptoms persist 5 minutes after 1st dose call 911. 25 tablet 1     atorvastatin (LIPITOR) 40 MG tablet Take 1 tablet (40 mg) by mouth daily 30 tablet 0     lisinopril (PRINIVIL/ZESTRIL) 40 MG tablet Take 1 tablet (40 mg) by mouth At Bedtime 30 tablet 0     metoprolol (LOPRESSOR) 25 MG tablet Take 1 tablet (25 mg) by mouth 2 times daily 60 tablet 0     ticagrelor (BRILINTA) 90 MG tablet Take 1 tablet (90 mg) by mouth every 12 hours 60 tablet 0     ASPIRIN PO Take 81 mg by mouth At Bedtime       venlafaxine (EFFEXOR) 37.5 MG tablet Take 37.5 mg by mouth 2 times daily       VITAMIN D, CHOLECALCIFEROL, PO Take 400 Units by mouth daily       omeprazole (PRILOSEC) 20 MG CR capsule Take 20 mg by mouth 2 times daily         ALLERGIES   No Known Allergies    PAST MEDICAL HISTORY:  Past Medical History:   Diagnosis Date     Anxiety      CAD (coronary artery disease)     NonSTEMI 06/2017. Stent to RCA     Hyperlipemia      Hypertension      Ulcerative colitis (H)        PAST SURGICAL HISTORY:  No past surgical history on file.    FAMILY HISTORY:  Family History   Problem Relation Age of Onset     Myocardial Infarction Mother        SOCIAL HISTORY:  Social History     Social History     Marital status:      Spouse name: N/A     Number of children: N/A     Years of education: N/A     Social History Main Topics     Smoking status: Never Smoker     Smokeless tobacco: None     Alcohol use Yes       "Comment: 2 drinks per year     Drug use: None     Sexual activity: No     Other Topics Concern     Parent/Sibling W/ Cabg, Mi Or Angioplasty Before 65f 55m? Yes     Social History Narrative         Review of Systems:  Skin:  Negative       Eyes:  Positive for glasses reading glasses  ENT:  Negative      Respiratory:  Negative       Cardiovascular:  Negative      Gastroenterology: Negative      Genitourinary:  Negative      Musculoskeletal:  Positive for back pain    Neurologic:  Negative      Psychiatric:  Negative      Heme/Lymph/Imm:  Negative      Endocrine:  Negative        Physical Exam:  Vitals: /76 (BP Location: Right arm, Patient Position: Sitting, Cuff Size: Adult Large)  Pulse 68  Ht 1.702 m (5' 7\")  Wt 115.4 kg (254 lb 6.4 oz)  BMI 39.84 kg/m2    Constitutional:           Skin:           Head:           Eyes:           ENT:           Neck:           Chest:             Cardiac:                    Abdomen:           Vascular:                                          Extremities and Back:                 Neurological:             Recent Lab Results:  LIPID RESULTS:  Lab Results   Component Value Date    CHOL 148 06/15/2017    HDL 48 06/15/2017    LDL 58 06/15/2017    TRIG 212 (H) 06/15/2017    CHOLHDLRATIO 2.6 08/11/2009       LIVER ENZYME RESULTS:  Lab Results   Component Value Date    AST 30 06/14/2017    ALT 43 06/14/2017       CBC RESULTS:  Lab Results   Component Value Date    WBC 5.8 06/16/2017    RBC 4.48 06/16/2017    HGB 13.7 06/16/2017    HCT 39.4 (L) 06/16/2017    MCV 88 06/16/2017    MCH 30.6 06/16/2017    MCHC 34.8 06/16/2017    RDW 12.1 06/16/2017     06/16/2017       BMP RESULTS:  Lab Results   Component Value Date     06/16/2017    POTASSIUM 3.8 06/16/2017    CHLORIDE 106 06/16/2017    CO2 28 06/16/2017    ANIONGAP 6 06/16/2017     (H) 06/16/2017    BUN 11 06/16/2017    CR 0.86 06/16/2017    GFRESTIMATED >90  Non  GFR Calc   06/16/2017    " GFRESTBLACK >90   GFR Calc   06/16/2017    DYANA 9.0 06/16/2017        A1C RESULTS:  Lab Results   Component Value Date    A1C 5.8 06/14/2017       INR RESULTS:  Lab Results   Component Value Date    INR 1.05 06/15/2017    INR 1.22 (H) 08/16/2009           CC  Mike Tirado MD  [address]  [place] [id number]

## 2017-08-11 ENCOUNTER — HOSPITAL ENCOUNTER (OUTPATIENT)
Dept: CARDIAC REHAB | Facility: CLINIC | Age: 57
End: 2017-08-11
Attending: INTERNAL MEDICINE
Payer: COMMERCIAL

## 2017-08-11 PROCEDURE — 93798 PHYS/QHP OP CAR RHAB W/ECG: CPT

## 2017-08-11 PROCEDURE — 40000116 ZZH STATISTIC OP CR VISIT

## 2017-08-11 NOTE — PROGRESS NOTES
HISTORY OF PRESENT ILLNESS:  I saw Darryl Uribe today, who is post-RCA stenting in 2017.  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  He had mild disease of his LAD.  The remaining vessels were relatively healthy and normal.      He has dutifully followed the medication program that we have given him includin.  Atorvastatin 40 mg at bedtime.   2.  Lisinopril 40 mg at bedtime.   3.  Metoprolol tartrate 25 mg b.i.d.   4.  Brilinta 90 mg b.i.d.   5.  Aspirin 81 mg a day.   6.  Effexor 37.5 mg b.i.d.   7.  Omeprazole, calcium and vitamin D.      PHYSICAL EXAMINATION:   GENERAL:  Shows that he remains overweight at 254 pounds.   VITAL SIGNS:  Blood pressure 130/76, heart rate 68 beats a minute.   HEAD:  Normal.   NECK:  Free of neck vein distention or bruits.   HEART:  Regular without gallop or murmur.   LUNGS:  Clear.   ABDOMEN:  Soft without organomegaly.  He is overweight.   EXTREMITIES:  Show +2 pedal pulses, no edema.   NEUROLOGIC AND CUTANEOUS:  Normal.      Darryl is 57.  We discussed at length diet, weight loss and exercise.      I reviewed each of his medicines and their indications with regards to long-term arterial treatment, namely aspirin, statin, beta blocker and ACE inhibitor.  He will take the Brilinta until 2018.  I will see him at that time and likely stop the drug.  Thereafter, we will see what he needs.  For the time being, I was pleased with his seeming good intentions and at least verbally he seemed to committed to maintaining a healthier lifestyle.      IMPRESSION:   1.  Asymptomatic coronary artery disease.   2.  Post-RCA stenting.   3.  Trivial coronary disease otherwise.   4.  Normal LV function.   5.  Treated hyperlipidemia.   6.  Treated hypertension.      PLAN:  As above.      Warm regards.  If you have any questions, concerns or disagreements, give me a call.      cc:   [facility]   [address] [place]  [address]         CIRA SQUIRES MD, [facility]             D: 08/10/2017 17:23   T: 2017 10:04   MT: al      Name:     ROHITH DALY   MRN:      [id number]        Account:      [id number]   :      1960           Service Date: 08/10/2017      Document: [id number]

## 2017-08-14 ENCOUNTER — HOSPITAL ENCOUNTER (OUTPATIENT)
Dept: CARDIAC REHAB | Facility: CLINIC | Age: 57
End: 2017-08-14
Attending: INTERNAL MEDICINE
Payer: COMMERCIAL

## 2017-08-14 VITALS — BODY MASS INDEX: 39.25 KG/M2 | HEIGHT: 67 IN | WEIGHT: 250.1 LBS

## 2017-08-14 PROCEDURE — 93797 PHYS/QHP OP CAR RHAB WO ECG: CPT | Mod: XU | Performed by: REHABILITATION PRACTITIONER

## 2017-08-14 PROCEDURE — 40000116 ZZH STATISTIC OP CR VISIT: Performed by: REHABILITATION PRACTITIONER

## 2017-08-14 PROCEDURE — 93798 PHYS/QHP OP CAR RHAB W/ECG: CPT | Performed by: REHABILITATION PRACTITIONER

## 2017-08-14 PROCEDURE — 40000575 ZZH STATISTIC OP CARDIAC VISIT #2: Performed by: REHABILITATION PRACTITIONER

## 2017-08-14 ASSESSMENT — 6 MINUTE WALK TEST (6MWT)
GENDER SELECTION: MALE
TOTAL DISTANCE WALKED (FT): 1923
PREDICTED: 1627.55
FEMALE CALC: 1431.17
TOTAL DISTANCE WALKED (FT): 2000
MALE CALC: 1617.68

## 2017-08-14 NOTE — PROGRESS NOTES
Darryl Uribe  57 year old  DX: NSTEMI STENT 08/14/17 1200   Session   Session Discharge Note   Certified through this date 09/02/17   Physician cosignature/electronic signature indicates agreements with the ITP document and approval of discharge.     Cardiac Rehab Assessment   Cardiac Rehab Assessment 6/29/17 PT seen for intial evaluation. PT is post NSTEMI and stent of RCA on 6/14/17. He has had some anxiety post MI, and was put on an anti-anxiety medication. He denies a history of depression. He has not had any cardiac symptoms. He had about a 2 month history of chest pain/tightness with walking. He has been walking 3 to 5 miles per day since hospital discharge. PT has been tired. Instructed PT to follow cardiac rehab guidelines. HR at 85% of APMHR with 6 minute walk test. PT will see NP tomorrow. Skilled therapy needed to safely progress exercise levels while monitoring HR and BP and work with PT to increase confidence to exercise independently. Therapy needed to work with PT on weight loss goals of 1 to 2 lbs per week. PT appears motivated to work on weight loss and exercise goals. 7/10/17 Progress update done today. He feels recovery is going well. He has been attending education classes and is finding them to be helpful. He is working on cutting carbs out of his diet and increasing fruits and vegetables. He will meet 1:1 with the . He does feel anxiety is improved. 1:1 done today. PT is making steady progress with rehab. He has attended all education classes offered. He is working on reducing the stress levels at work. He is working on weight loss. Reviewed the healing progress, risk factors, goals and progress with PT. PT given a food log and fat gram budget. Discussed home exercise guidelines. Skilled therapy needed to continue to safely progress exercise levels while monitoring HR and BP as well as provide education to decrease weight and increase confiendence to exercise independently.  DISCHARGE  "SESSION 8/14/17  PT progressed to 6.3 METS he started at 3.9 METS.  His BP continues to be elevated with peak exercise. THerapist suggested that he not work to his 85% age adjusted max HR as when he does his BP is elevated.  PT encouraged to go no higher than 132 bpm.  PT verbalized understancing.  PT has made signifficant dietary changes evident in his RYP score.  He admits to not watching his portion sizes and therefore has no lost wt.  PT used to belong to a health club and is thinking about returning. Or he might join the [employer] program.  PT DC from cardiac rehab today.    General Information   Treatment Diagnosis NSTEMI   Date of Treatment Diagnosis 06/14/17   Secondary Treatment Diagnosis Stent   Significant Past CV History None   Comorbidities None   Other Medical History Illiostomy   Lead up symptoms SOB, slurred speech   Hospital Location [facility]   Hospital Discharge Date 06/16/17   Signs and Symptoms Post Hospital Discharge SOB  (PT may be \"overdoing it\")   Outpatient Cardiac Rehab Start Date 06/29/17   Primary Physician Dr. Oseas Correa   Primary Physician Follow Up Completed   Cardiologist Dr. Simon   Cardiologist Follow Up Scheduled   Ejection Fraction 60-65%   Risk Stratification Moderate   Summary of Cath Report   Summary of Cath Report Available   Date Performed 06/14/17   RCA 95% to 40% stenosis  (ADELFO)   Living and Work Status    Living Arrangements and Social Status spouse   Support System Live with an adult   Return to Employment Yes   Occupation Computer work   Preventative Medications   CMS recommended medications Ace inhibitors;Antiplatelets;Beta Blocker;Lipid Lowering;Influenza vaccination   Falls Screen   Have you fallen two or more times in the past year? No   Have you fallen and had an injury in the past year? No   Pain   Patient Currently in Pain No   Physical Assessments   Incisions Not applicable   Edema None   Right Lung Sounds not assessed   Left Lung Sounds not assessed   Comments wrist " "site sore from angiogram   Individualized Treatment Plan   Monitored Sessions Scheduled 24   Monitored Sessions Attended 20   Oxygen   Supplemental Oxygen needed No   Nutrition Management - Weight Management   Assessment Re-assessment   Age 57   Weight 113.4 kg (250 lb 1.6 oz)   Height 1.702 m (5' 7.01\")   BMI (Calculated) 39.24   Initial Rate Your Plate Score. Dietary tool to assess eating patterns. Scores range from 24 to 72. The higher the score the healthier the eating pattern. 58   Discharge Rate Your Plate Score 65   Weight Management Comments PT has been working to decrease carbs and increase fruits and vegetables. PT initially lost a couple of lbs, but recently has gained back what he lost. Discussed caloric intake. PT given a food log and fat gram budget. He has increased fruits and vegetables, and is overall trying to eat less fat and sodium.   8/14/17  PT admits to minimal changes in the quanitty of food that he eats.  He admits to eating healthy and will now start watching portion sizes.  Suggestions for improvements were offered.    Nutrition Management - Lipids   Lipids Labs Available   Date 06/14/17   Total Cholesterol 148   Triglycerides 212   HDL 48   LDL 58   Prescribed Lipid Medication Yes   Statin Intensity High Intensity   Lipid Comments 8/14/17  Discussec CHOL values and the benefits of the medications loweing numbers and beyond.     Nutrition Management - Diabetes   Diabetes No   Nutrition Management Summary   Dietary Recommendations Low Fat;Low Cholesterol;Low Sodium   Stages of Change for Diet Compliance Action   Interventions Planned Attend Nutrition Education Class(es);Refer for Individual Diet Consultation;Complete Food Diary;Instruct on Label Reading;Educate on Weight Management Principles;Educate on Benefits of Exercise   Interventions In Progress or Completed Attended Nutrition Class(es);Understands how to accurately read Food Labels;Understands Weight Management Principles;Educated on " Benefits of Exercise   Patient Goals Goal #1   Goal #1 Description PT would like to work on weight loss of 1 to 2 lbs per week. His short term goal is 180 lbs and long term goal is 168 lbs.    Goal #1 Target Date 08/29/17   Goal #1 Date Met (GOAL not met. )   Goal #1 Progress Towards Goal 8/14/17  Discussed patients goals and re-evaluated.  PT encouraged to first work on decreasing his wt by 10% or 25# this would put him at 225# and getting him closer to his overall goal.  PT reports that he has weighed 190# in past but to get to a BMI of 25 he would look very sixk and thin.  Discussed a BMI goal of 30-32 first adn then refocus.     Nutrition Target Outcome Weight loss .5-1 lb/week (if BMI > 25)   Psychosocial Management   Psychosocial Assessment Re-assessment   Is there history of clinical depression or increased risk of depression? No previous history  (Anxiety)   Current Level of Stress per Patient Report Moderate   (work related)   Current Coping Skills Is on Medication for Depression/Anxiety;Uses Stress Management/Relaxation Techniques  (walking and deep breathing)   Initial Patient Health Questionnaire -9 Score (PHQ-9) for depression. 5-9 Minimal symptoms, 10-14 Minor depression, 15-19 Major depression, moderately severe, > 20 Major depression, severe  1   Discharge PHQ-9 Score for Depression 0   Initial [employer] COOP Survey score.  Quality of Life:   If total score > 25 review individual areas where patient rated a 4 or 5.  Consider patients current medical condition and what role that plays on the score.   Adjust treatment protocol to improve areas of concern.  Consider the following:  PHQ9 score, DASI, and re-assessment within the next 30 days to assist with developing treatments.  17   Discharge [employer] COOP Survey Score 9   Stages of Change Action   Interventions Planned Patient to verbalize understanding of behavioral assessment results;Patient to verbalize understanding of negative impact of stress  "to personal health;Patient will recognize signs and symptoms of depression;Patient interested in implementing one strategy to reduce current level of stress/anxiety;Patient to attend stress management class(es)   Interventions In Progress or Completed Patient is able to identify positive support system;Patient verbalizes understanding of negative impact of stress to personal health;Pt recognizes signs and symptoms of depression;Patient was referred for 1:1 behavioral consult/chapliancy;Patient attended stress management class(es)  (wife is supportive, PT to meet 1:1 with )   Patient Goal No   Psychosocial Comments No history of depression. PT states that he does have anxiety at times. He is a \"type A\" and likes things a certain way. PT thinking about how he can reduce stress at work   8/14/17 PT given resources for ongoing stress management   Psychosocial Target Outcome Identify absence or presence of depression using valid screening tool;Maximize coping skills   Other Core Components - Hypertension   History of or Diagnosis of Hypertension Yes   Currently taking Anti-Hypertensives Yes;Beta blocker;Ace Inhibitor   Hypertension Comments BP exaggerrated with exercise. Will continue to assess.    Other Core Components - Tobacco   History of Tobacco Use Never   Other Core Components Summary   Interventions Planned None   Activity/Exercise History   Activity/Exercise Assessment Re-assessment   Activity/Exercise Status prior to event? Was Physically Active   Number of Days Currently participating in Moderate Physical Activity? 3   Number of Days Currently performing  Aerobic Exercise (including rehab)? daily   Number of Minutes per Session Currently of Aerobic Exercise (average)? 30 to 50 minutes   Current Stage of Change (Physical Activity) Action   Current Stage of Change (Aerobic Exercise) Maintenance   Patient Goals Goal #1   Goal #1 Description PT will attend cardiac rehab 2 to 3 times per week to increase " confidence with goal of adding jogging before cardiac rehab is complete. PT would like to run a 5K in a year.    Goal #1 Target Date 07/29/17   Goal #1 Progress Towards Goal 7/10/17 PT has attended rehab 5 times. He is walking for 3 miles on days off from rehab. He is making nice progress with rehab, and is currently at a 5.4 MET level 8/14/17  Discussed with patient HR goals and a plan to start jogging once his HR moderates at his current levels.  PT curently achieving 85%  PT encouraged to not get to 85% as when he does his BP is elevated significantly.  We discussed that with conditioning he will contninue to be able to progress and do more yet his HR will stay below 85%.  PT verbalixed understanding of this process.   Activity/Exercise Comments PT ran 2-5K's this year. He would like to run another next year.    Activity/Exercise Target Outcome An Accumulation of 150  Minutes of Aerobic Activity per Week   Exercise Assessment   6 Minute Walk Predicted - Gender Selection Male   6 Minute Walk Predicted (Male) 1617.68   6 Minute Walk Predicted (Female) 1431.17   Initial 6 Minute Walk Distance (Feet) 1923 ft   Discharge 6 Minute Walk Distance (Feet) 2000   Resting HR 55 bpm   Exercise  bpm   Post Exercise HR 77 bpm   Resting /74   Exercise /70   Post Exercise /70   Current MET Level 6.4   MET Level Goal 6.0 to 6.5   ECG Rhythm Sinus rhythm   Ectopy None   Current Symptoms Denies symptoms   Limitations/Restrictions None   Exercise Prescription   Mode Treadmill;Airdyne;Nustep;Elliptical;Weights   Duration/Time 30-45 min   Frequency 2 days/week   THR (85% of age predicted max HR) 138.55   OMNI Effort Rating (0-10 Scale) 4-6/10   Progression Continuous bouts;Total exercise time of 30-45 minutes;Aerobic exercise to OMNI rating of 6 or below and at or below THR;Progress peak intensity by 1/2 MET per week   Comments 8/14/17  PT aware of 85% HR of 138 bpm suggested that patient not go beyond 132 bpm  due to BP response and the fact he will no longer have monitoring.   PT thinking about joining the [facility] program.    Recommended Home Exercise   Type of Exercise Walking;Treadmill   Frequency (days per week) on days off from rehab.    Duration (minutes per session) 30-45 min   Effort Rating Recommended 4-6/10   30 Day Exercise Plan PT to exercise on days off from rehab to increase to 3 miles by the end of 30 days.    Current Home Exercise   Type of Exercise Walking  (PT has a TM and weights at home)   Frequency (days per week) 6   Duration (minutes per session) 3 to 5 miles  (instructed PT to walk 20 to 25 minutes until rehab increases)   Follow-up/On-going Support   Provider follow-up needed on the following No follow-up needed  (HR at 85% of APMHR with 6 minute walk test)   Comments PT would like to start adding bouts of jogging, will re-assess after BP better controlled with exercise.    Learning Assessment   Learner Patient   Primary Language English   Preferred Learning Style Listening;Reading;Demonstration   Barriers to Learning No barriers noted   Patient Education   Education recommended Anatomy and Physiology of the Heart;Blood Pressure;Exercise Principles;Medication Overview;Nutrition;Stress Management   Education classes attended Anatomy and Physiology of the Heart;Exercise Principles;Medication Overview;Nutrition;Stress Management

## 2017-10-17 ENCOUNTER — TRANSFERRED RECORDS (OUTPATIENT)
Dept: HEALTH INFORMATION MANAGEMENT | Facility: CLINIC | Age: 57
End: 2017-10-17

## 2017-10-17 LAB
ALBUMIN SERPL-MCNC: 4 G/DL
ALP SERPL-CCNC: 40 U/L
ALT SERPL-CCNC: 35 U/L
AST SERPL-CCNC: 27 U/L
BILIRUB SERPL-MCNC: 0.46 MG/DL
BILIRUBIN DIRECT: 0.11 MG/DL
CHOLEST SERPL-MCNC: 157 MG/DL
HDLC SERPL-MCNC: 55 MG/DL
LDLC SERPL CALC-MCNC: 65 MG/DL
NONHDLC SERPL-MCNC: NORMAL MG/DL
PROT SERPL-MCNC: 6.8 G/DL
TRIGL SERPL-MCNC: 181 MG/DL

## 2017-12-19 ENCOUNTER — TRANSFERRED RECORDS (OUTPATIENT)
Dept: HEALTH INFORMATION MANAGEMENT | Facility: CLINIC | Age: 57
End: 2017-12-19

## 2018-02-13 DIAGNOSIS — I21.4 NSTEMI (NON-ST ELEVATED MYOCARDIAL INFARCTION) (H): ICD-10-CM

## 2018-02-13 DIAGNOSIS — E78.2 MIXED HYPERLIPIDEMIA: ICD-10-CM

## 2018-02-13 DIAGNOSIS — Z98.61 POSTSURGICAL PERCUTANEOUS TRANSLUMINAL CORONARY ANGIOPLASTY STATUS: ICD-10-CM

## 2018-02-13 RX ORDER — NITROGLYCERIN 0.4 MG/1
TABLET SUBLINGUAL
Qty: 25 TABLET | Refills: 1 | Status: SHIPPED | OUTPATIENT
Start: 2018-02-13 | End: 2024-02-16

## 2018-02-13 NOTE — TELEPHONE ENCOUNTER
Received refill request for:  NTG  Last OV was: 8/10/2017 with Dr. Tirado  Labs/EKG: n/a  F/U scheduled: orders placed in Epic for 6/2018 per last OV note  [facility]

## 2018-07-10 DIAGNOSIS — I21.4 NSTEMI (NON-ST ELEVATED MYOCARDIAL INFARCTION) (H): ICD-10-CM

## 2018-07-19 ENCOUNTER — CARE COORDINATION (OUTPATIENT)
Dept: CARDIOLOGY | Facility: CLINIC | Age: 58
End: 2018-07-19

## 2018-07-23 ENCOUNTER — OFFICE VISIT (OUTPATIENT)
Dept: CARDIOLOGY | Facility: CLINIC | Age: 58
End: 2018-07-23
Payer: COMMERCIAL

## 2018-07-23 VITALS
HEIGHT: 67 IN | SYSTOLIC BLOOD PRESSURE: 122 MMHG | BODY MASS INDEX: 41.76 KG/M2 | DIASTOLIC BLOOD PRESSURE: 64 MMHG | HEART RATE: 86 BPM | WEIGHT: 266.1 LBS

## 2018-07-23 DIAGNOSIS — I21.4 NSTEMI (NON-ST ELEVATED MYOCARDIAL INFARCTION) (H): ICD-10-CM

## 2018-07-23 DIAGNOSIS — I10 BENIGN ESSENTIAL HYPERTENSION: Primary | ICD-10-CM

## 2018-07-23 DIAGNOSIS — E78.2 MIXED HYPERLIPIDEMIA: ICD-10-CM

## 2018-07-23 PROCEDURE — 99214 OFFICE O/P EST MOD 30 MIN: CPT | Performed by: NURSE PRACTITIONER

## 2018-07-23 NOTE — MR AVS SNAPSHOT
After Visit Summary   7/23/2018    Darryl Uribe    MRN: [id number]           Patient Information     Date Of Birth          1960        Visit Information        Provider Department      7/23/2018 3:50 PM Anette Forbes, APRN CNP [facility]        Today's Diagnoses     Benign essential hypertension    -  1    Mixed hyperlipidemia        NSTEMI (non-ST elevated myocardial infarction) (H)          Care Instructions    I will call with the results of the stress test and plans moving forward depend on results          Follow-ups after your visit        Your next 10 appointments already scheduled     Oct 22, 2018 11:15 AM CDT   New Visit with Landon Amaya MD   [facility] ([facility])    [address] [place] [address] [address]  [facility] [address] [phone] OPT 2              Future tests that were ordered for you today     Open Future Orders        Priority Expected Expires Ordered    NM Exercise stress test (nuc card) Routine 7/30/2018 7/23/2019 7/23/2018            Who to contact     If you have questions or need follow up information about today's clinic visit or your schedule please contact [facility] directly at [phone].  Normal or non-critical lab and imaging results will be communicated to you by MyChart, letter or phone within 4 business days after the clinic has received the results. If you do not hear from us within 7 days, please contact the clinic through schooxhart or phone. If you have a critical or abnormal lab result, we will notify you by phone as soon as possible.  Submit refill requests through [employer] or call your pharmacy and they will forward the refill request to us. Please allow 3 business days for your refill to be completed.          Additional Information About Your Visit        MyChart Information     [employer] gives you secure access to your  "electronic health record. If you see a primary care provider, you can also send messages to your care team and make appointments. If you have questions, please call your primary care clinic.  If you do not have a primary care provider, please call [phone] and they will assist you.        Care EveryWhere ID     This is your Care EveryWhere ID. This could be used by other organizations to access your [facility] medical records  [id number]        Your Vitals Were     Pulse Height BMI (Body Mass Index)             86 1.702 m (5' 7\") 41.68 kg/m2          Blood Pressure from Last 3 Encounters:   07/23/18 122/64   08/10/17 130/76   06/30/17 120/78    Weight from Last 3 Encounters:   07/23/18 120.7 kg (266 lb 1.6 oz)   08/14/17 113.4 kg (250 lb 1.6 oz)   08/10/17 115.4 kg (254 lb 6.4 oz)                 Today's Medication Changes          These changes are accurate as of 7/23/18  4:15 PM.  If you have any questions, ask your nurse or doctor.               Stop taking these medicines if you haven't already. Please contact your care team if you have questions.     ticagrelor 90 MG tablet   Commonly known as:  BRILINTA   Stopped by:  Anette Forbes, APRN CNP                    Primary Care Provider Fax #    [facility] [phone] [address] [address] [facility] [address]        Equal Access to Services     [facility] AH: Hadii erasto flores hadasho Soyaritza, waaxda luqadaha, qaybta kaalmada ting cabrera . So [facility] [phone].    ATENCIÓN: Si habla español, tiene a kincaid disposición servicios gratuitos de asistencia lingüística. Llame al [phone].    We comply with applicable federal civil rights laws and [place] laws. We do not discriminate on the basis of race, color, national origin, age, disability, sex, sexual orientation, or gender identity.            Thank you!     Thank you for choosing [facility]  for your care. Our " goal is always to provide you with excellent care. Hearing back from our patients is one way we can continue to improve our services. Please take a few minutes to complete the written survey that you may receive in the mail after your visit with us. Thank you!             Your Updated Medication List - Protect others around you: Learn how to safely use, store and throw away your medicines at www.disposemymeds.org.          This list is accurate as of 7/23/18  4:15 PM.  Always use your most recent med list.                   Brand Name Dispense Instructions for use Diagnosis    ASPIRIN PO      Take 81 mg by mouth At Bedtime        atorvastatin 40 MG tablet    LIPITOR    30 tablet    Take 1 tablet (40 mg) by mouth daily    NSTEMI (non-ST elevated myocardial infarction) (H)       lisinopril 40 MG tablet    PRINIVIL/ZESTRIL    30 tablet    Take 1 tablet (40 mg) by mouth At Bedtime    NSTEMI (non-ST elevated myocardial infarction) (H)       metoprolol tartrate 25 MG tablet    LOPRESSOR    60 tablet    Take 1 tablet (25 mg) by mouth 2 times daily    NSTEMI (non-ST elevated myocardial infarction) (H)       nitroGLYcerin 0.4 MG sublingual tablet    NITROSTAT    25 tablet    For chest pain place 1 tablet under the tongue every 5 minutes for 3 doses. If symptoms persist 5 minutes after 1st dose call 911.    Postsurgical percutaneous transluminal coronary angioplasty status, NSTEMI (non-ST elevated myocardial infarction) (H), Mixed hyperlipidemia       omeprazole 20 MG CR capsule    priLOSEC     Take 20 mg by mouth 2 times daily        venlafaxine 37.5 MG tablet    EFFEXOR     Take 37.5 mg by mouth 2 times daily        VITAMIN D (CHOLECALCIFEROL) PO      Take 400 Units by mouth daily

## 2018-07-23 NOTE — LETTER
7/23/2018    [facility]  [address]  [facility] [address]    RE: Darryl Uribe       Dear Colleague,    I had the pleasure of seeing Darryl Uribe in the [facility].    HPI and Plan:   See dictation    Orders Placed This Encounter   Procedures     NM Exercise stress test (nuc card)     No orders of the defined types were placed in this encounter.    Medications Discontinued During This Encounter   Medication Reason     ticagrelor (BRILINTA) 90 MG tablet          Encounter Diagnoses   Name Primary?     Benign essential hypertension Yes     Mixed hyperlipidemia      NSTEMI (non-ST elevated myocardial infarction) (H)        CURRENT MEDICATIONS:  Current Outpatient Prescriptions   Medication Sig Dispense Refill     ASPIRIN PO Take 81 mg by mouth At Bedtime       atorvastatin (LIPITOR) 40 MG tablet Take 1 tablet (40 mg) by mouth daily 30 tablet 0     lisinopril (PRINIVIL/ZESTRIL) 40 MG tablet Take 1 tablet (40 mg) by mouth At Bedtime 30 tablet 0     metoprolol (LOPRESSOR) 25 MG tablet Take 1 tablet (25 mg) by mouth 2 times daily 60 tablet 0     nitroGLYcerin (NITROSTAT) 0.4 MG sublingual tablet For chest pain place 1 tablet under the tongue every 5 minutes for 3 doses. If symptoms persist 5 minutes after 1st dose call 911. 25 tablet 1     omeprazole (PRILOSEC) 20 MG CR capsule Take 20 mg by mouth 2 times daily       venlafaxine (EFFEXOR) 37.5 MG tablet Take 37.5 mg by mouth 2 times daily       VITAMIN D, CHOLECALCIFEROL, PO Take 400 Units by mouth daily         ALLERGIES   No Known Allergies    PAST MEDICAL HISTORY:  Past Medical History:   Diagnosis Date     Anxiety      CAD (coronary artery disease)     NonSTEMI 06/2017. Stent to RCA     Hyperlipemia      Hypertension      Ulcerative colitis (H)        PAST SURGICAL HISTORY:  History reviewed. No pertinent surgical history.    FAMILY HISTORY:  Family History   Problem Relation Age of Onset     Myocardial Infarction  "Mother        SOCIAL HISTORY:  Social History     Social History     Marital status:      Spouse name: N/A     Number of children: N/A     Years of education: N/A     Social History Main Topics     Smoking status: Never Smoker     Smokeless tobacco: Never Used     Alcohol use No      Comment: 2 drinks per year     Drug use: None     Sexual activity: No     Other Topics Concern     Parent/Sibling W/ Cabg, Mi Or Angioplasty Before 65f 55m? Yes     Social History Narrative       Review of Systems:  Skin:  Negative     Eyes:  Positive for glasses  ENT:  Negative    Respiratory:  Positive for shortness of breath;dyspnea on exertion;sleep apnea;CPAP  Cardiovascular:  palpitations;Negative for;chest pain;edema heaviness;Positive for;fatigue;dizziness;lightheadedness  Gastroenterology: Negative    Genitourinary:  Negative    Musculoskeletal:  Positive for back pain  Neurologic:  Positive for numbness or tingling of feet  Psychiatric:  Negative    Heme/Lymph/Imm:  Negative    Endocrine:  Negative      Physical Exam:  Vitals: /64  Pulse 86  Ht 1.702 m (5' 7\")  Wt 120.7 kg (266 lb 1.6 oz)  BMI 41.68 kg/m2    Constitutional:  cooperative, alert and oriented, well developed, well nourished, in no acute distress obese;appears anxious      Skin:  warm and dry to the touch, no apparent skin lesions or masses noted          Head:  normocephalic        Eyes:  pupils equal and round        Lymph:      ENT:  dentition good        Neck:  carotid pulses are full and equal bilaterally, JVP normal, no carotid bruit        Respiratory:  normal breath sounds, clear to auscultation, normal A-P diameter, normal symmetry, normal respiratory excursion, no use of accessory muscles         Cardiac: regular rhythm, normal S1/S2, no S3 or S4, apical impulse not displaced, no murmurs, gallops or rubs                                                         GI:  abdomen soft obese      Extremities and Muscular Skeletal:  no " deformities, clubbing, cyanosis, erythema observed;no edema              Neurological:  no gross motor deficits        Psych:  Alert and Oriented x 3        Recent Lab Results:  LIPID RESULTS:  Lab Results   Component Value Date    CHOL 157 10/17/2017    HDL 55 10/17/2017    LDL 65 10/17/2017    TRIG 181 10/17/2017    CHOLHDLRATIO 2.6 08/11/2009       LIVER ENZYME RESULTS:  Lab Results   Component Value Date    AST 27 10/17/2017    ALT 35 10/17/2017       CBC RESULTS:  Lab Results   Component Value Date    WBC 5.8 06/16/2017    RBC 4.48 06/16/2017    HGB 13.7 06/16/2017    HCT 39.4 (L) 06/16/2017    MCV 88 06/16/2017    MCH 30.6 06/16/2017    MCHC 34.8 06/16/2017    RDW 12.1 06/16/2017     06/16/2017       BMP RESULTS:  Lab Results   Component Value Date     06/16/2017    POTASSIUM 3.8 06/16/2017    CHLORIDE 106 06/16/2017    CO2 28 06/16/2017    ANIONGAP 6 06/16/2017     (H) 06/16/2017    BUN 11 06/16/2017    CR 0.86 06/16/2017    GFRESTIMATED >90  Non  GFR Calc   06/16/2017    GFRESTBLACK >90   GFR Calc   06/16/2017    DYANA 9.0 06/16/2017        A1C RESULTS:  Lab Results   Component Value Date    A1C 5.8 06/14/2017       INR RESULTS:  Lab Results   Component Value Date    INR 1.05 06/15/2017    INR 1.22 (H) 08/16/2009           CC  No referring provider defined for this encounter.                  Thank you for allowing me to participate in the care of your patient.      Sincerely,     ZACHERY Chen [facility]    cc:   No referring provider defined for this encounter.

## 2018-07-23 NOTE — PROGRESS NOTES
HPI and Plan:   See dictation    Orders Placed This Encounter   Procedures     NM Exercise stress test (nuc card)     No orders of the defined types were placed in this encounter.    Medications Discontinued During This Encounter   Medication Reason     ticagrelor (BRILINTA) 90 MG tablet          Encounter Diagnoses   Name Primary?     Benign essential hypertension Yes     Mixed hyperlipidemia      NSTEMI (non-ST elevated myocardial infarction) (H)        CURRENT MEDICATIONS:  Current Outpatient Prescriptions   Medication Sig Dispense Refill     ASPIRIN PO Take 81 mg by mouth At Bedtime       atorvastatin (LIPITOR) 40 MG tablet Take 1 tablet (40 mg) by mouth daily 30 tablet 0     lisinopril (PRINIVIL/ZESTRIL) 40 MG tablet Take 1 tablet (40 mg) by mouth At Bedtime 30 tablet 0     metoprolol (LOPRESSOR) 25 MG tablet Take 1 tablet (25 mg) by mouth 2 times daily 60 tablet 0     nitroGLYcerin (NITROSTAT) 0.4 MG sublingual tablet For chest pain place 1 tablet under the tongue every 5 minutes for 3 doses. If symptoms persist 5 minutes after 1st dose call 911. 25 tablet 1     omeprazole (PRILOSEC) 20 MG CR capsule Take 20 mg by mouth 2 times daily       venlafaxine (EFFEXOR) 37.5 MG tablet Take 37.5 mg by mouth 2 times daily       VITAMIN D, CHOLECALCIFEROL, PO Take 400 Units by mouth daily         ALLERGIES   No Known Allergies    PAST MEDICAL HISTORY:  Past Medical History:   Diagnosis Date     Anxiety      CAD (coronary artery disease)     NonSTEMI 06/2017. Stent to RCA     Hyperlipemia      Hypertension      Ulcerative colitis (H)        PAST SURGICAL HISTORY:  History reviewed. No pertinent surgical history.    FAMILY HISTORY:  Family History   Problem Relation Age of Onset     Myocardial Infarction Mother        SOCIAL HISTORY:  Social History     Social History     Marital status:      Spouse name: N/A     Number of children: N/A     Years of education: N/A     Social History Main Topics     Smoking status:  "Never Smoker     Smokeless tobacco: Never Used     Alcohol use No      Comment: 2 drinks per year     Drug use: None     Sexual activity: No     Other Topics Concern     Parent/Sibling W/ Cabg, Mi Or Angioplasty Before 65f 55m? Yes     Social History Narrative       Review of Systems:  Skin:  Negative     Eyes:  Positive for glasses  ENT:  Negative    Respiratory:  Positive for shortness of breath;dyspnea on exertion;sleep apnea;CPAP  Cardiovascular:  palpitations;Negative for;chest pain;edema heaviness;Positive for;fatigue;dizziness;lightheadedness  Gastroenterology: Negative    Genitourinary:  Negative    Musculoskeletal:  Positive for back pain  Neurologic:  Positive for numbness or tingling of feet  Psychiatric:  Negative    Heme/Lymph/Imm:  Negative    Endocrine:  Negative      Physical Exam:  Vitals: /64  Pulse 86  Ht 1.702 m (5' 7\")  Wt 120.7 kg (266 lb 1.6 oz)  BMI 41.68 kg/m2    Constitutional:  cooperative, alert and oriented, well developed, well nourished, in no acute distress obese;appears anxious      Skin:  warm and dry to the touch, no apparent skin lesions or masses noted          Head:  normocephalic        Eyes:  pupils equal and round        Lymph:      ENT:  dentition good        Neck:  carotid pulses are full and equal bilaterally, JVP normal, no carotid bruit        Respiratory:  normal breath sounds, clear to auscultation, normal A-P diameter, normal symmetry, normal respiratory excursion, no use of accessory muscles         Cardiac: regular rhythm, normal S1/S2, no S3 or S4, apical impulse not displaced, no murmurs, gallops or rubs                                                         GI:  abdomen soft obese      Extremities and Muscular Skeletal:  no deformities, clubbing, cyanosis, erythema observed;no edema              Neurological:  no gross motor deficits        Psych:  Alert and Oriented x 3        Recent Lab Results:  LIPID RESULTS:  Lab Results   Component Value Date    " CHOL 157 10/17/2017    HDL 55 10/17/2017    LDL 65 10/17/2017    TRIG 181 10/17/2017    CHOLHDLRATIO 2.6 08/11/2009       LIVER ENZYME RESULTS:  Lab Results   Component Value Date    AST 27 10/17/2017    ALT 35 10/17/2017       CBC RESULTS:  Lab Results   Component Value Date    WBC 5.8 06/16/2017    RBC 4.48 06/16/2017    HGB 13.7 06/16/2017    HCT 39.4 (L) 06/16/2017    MCV 88 06/16/2017    MCH 30.6 06/16/2017    MCHC 34.8 06/16/2017    RDW 12.1 06/16/2017     06/16/2017       BMP RESULTS:  Lab Results   Component Value Date     06/16/2017    POTASSIUM 3.8 06/16/2017    CHLORIDE 106 06/16/2017    CO2 28 06/16/2017    ANIONGAP 6 06/16/2017     (H) 06/16/2017    BUN 11 06/16/2017    CR 0.86 06/16/2017    GFRESTIMATED >90  Non  GFR Calc   06/16/2017    GFRESTBLACK >90   GFR Calc   06/16/2017    DYANA 9.0 06/16/2017        A1C RESULTS:  Lab Results   Component Value Date    A1C 5.8 06/14/2017       INR RESULTS:  Lab Results   Component Value Date    INR 1.05 06/15/2017    INR 1.22 (H) 08/16/2009           CC  No referring provider defined for this encounter.

## 2018-07-23 NOTE — LETTER
7/23/2018      Oseas Correa MD  [facility] [address]  [facility] [address]      RE: Darryl MANNING Rony       Dear Colleague,    I had the pleasure of seeing Darryl Uribe in the [facility].    Service Date: 07/23/2018      HISTORY OF PRESENT ILLNESS:  Mr. Uribe is a 58-year-old gentleman who is here today for an annual evaluation of his coronary artery disease.  He has a history of a non-ST elevation myocardial infarction in 06/2017.  He presented with chest pain with EKG changes in the inferior leads and elevated troponin.  He was taken to the Cath Lab where a focal subtotal 95% right coronary lesion was noted.  The mid and distal vessel had no significant narrowing, but there was a long tubular 40% stenosis in the mid right coronary artery.  He underwent stenting of his proximal RCA with brisk EDANNA 3 flow in the mid and distal area.  He was placed on Brilinta 90 mg b.i.d.  He has remained on that medication with aspirin since.  Cardiac risk factors include hypertension, hyperlipidemia and obesity.  His blood pressure has been well controlled with lisinopril 40 mg a day and metoprolol 25 mg twice daily.  His last cholesterol was checked last fall.  His total cholesterol was 157, HDL 55, LDL 65, triglycerides 181.  Today he reports he has felt very tired.  He says he runs out of energy fast.  He says after walking a couple of blocks, he gets short of breath.  He has put on some weight, and now he finds it difficult to lose.  According to our scale, his weight is up 13 pounds since we saw him about a year ago.  He does report shortness of breath when he tries to push himself along with some chest pressure.  He denies lightheadedness, dizziness.  He reports no PND.      He stated he last saw his primary care doctor last November.  He reports no new health problems.      PHYSICAL EXAMINATION:   GENERAL:  The patient is alert and oriented.   SKIN:  Warm and dry.    VITAL SIGNS:  Blood pressure is 122/64, pulse is 86, weight today is 266 pounds.  Height is 5 feet 7 inches tall.  BMI is 41.76.   CARDIAC:  Heart tones are distant but regular with an S1, S2 without an S3, S4.   LUNGS:  Clear without crackles or wheezes.   ABDOMEN:  Soft but obese.   EXTREMITIES:  Lower extremities are free of edema.      IMPRESSION AND PLAN:   1.  Patient with a history of coronary artery disease, status post PCI with drug-eluting stent to his RCA about a year ago.  He remains both on aspirin and Brilinta.  Today he is reporting feeling very tired, sluggish with progressive dyspnea with exertion.  The patient does not know if is his age, having gained weight or if he is having a heart problem.  He is concerned about his stent.  Today I offered to do a nuclear stress test so we can evaluate the inferior wall to evaluate for further ischemia.  The patient will be set up for this stress test.  I will call him up to review the results.   2.  History of hyperlipidemia.  His total cholesterol is well managed with atorvastatin 40 mg per day.   3.  History of hypertension.  His blood pressure looks well managed today.  If the stress test does not give us any answers of his fatigue and shortness of breath, he may benefit from the WEL Program through [place] for a structured exercise program.  He did complete the cardiac Phase II Program last summer.  I also will steer him towards his primary care doctor to see if there is any other causes of his fatigue.      It has been my pleasure to see Darryl again.      Anette Forbes MS, ANP         ZACHERY MATHEWS, CNP             D: 2018   T: 2018   MT: DU      Name:     DARRYL DALY   MRN:      [id number]        Account:      [id number]   :      1960           Service Date: 2018      Document: [id number]           No facility-administered encounter medications on file as of 2018.      Outpatient Encounter Prescriptions as of  7/23/2018   Medication Sig Dispense Refill     ASPIRIN PO Take 81 mg by mouth every morning        atorvastatin (LIPITOR) 40 MG tablet Take 1 tablet (40 mg) by mouth daily 30 tablet 0     lisinopril (PRINIVIL/ZESTRIL) 40 MG tablet Take 1 tablet (40 mg) by mouth At Bedtime 30 tablet 0     metoprolol (LOPRESSOR) 25 MG tablet Take 1 tablet (25 mg) by mouth 2 times daily 60 tablet 0     nitroGLYcerin (NITROSTAT) 0.4 MG sublingual tablet For chest pain place 1 tablet under the tongue every 5 minutes for 3 doses. If symptoms persist 5 minutes after 1st dose call 911. 25 tablet 1     omeprazole (PRILOSEC) 20 MG CR capsule Take 20 mg by mouth 2 times daily       venlafaxine (EFFEXOR) 37.5 MG tablet Take 37.5 mg by mouth 2 times daily       VITAMIN D, CHOLECALCIFEROL, PO Take 400 Units by mouth daily       [DISCONTINUED] ticagrelor (BRILINTA) 90 MG tablet Take 1 tablet (90 mg) by mouth every 12 hours 60 tablet 0       Again, thank you for allowing me to participate in the care of your patient.      Sincerely,    ZACHERY Chen [facility]

## 2018-07-24 ENCOUNTER — CARE COORDINATION (OUTPATIENT)
Dept: CARDIOLOGY | Facility: CLINIC | Age: 58
End: 2018-07-24

## 2018-07-24 DIAGNOSIS — R06.02 SOB (SHORTNESS OF BREATH): Primary | ICD-10-CM

## 2018-07-24 NOTE — PROGRESS NOTES
Called pt per request of YONG Amaya.     Anette Forbes, APRN CNP  P [place] p Heart Team 5                   pls call pt and have him come in earlier for lab draw, he is scheduled for stress test tomorrow and I want to check a hemoglobin to rule out anemia, He just stopped brilinta. Thanks       No answer, left  with team 5 call back number and scheduling number.

## 2018-07-24 NOTE — PROGRESS NOTES
Service Date: 07/23/2018      HISTORY OF PRESENT ILLNESS:  Mr. Uribe is a 58-year-old gentleman who is here today for an annual evaluation of his coronary artery disease.  He has a history of a non-ST elevation myocardial infarction in 06/2017.  He presented with chest pain with EKG changes in the inferior leads and elevated troponin.  He was taken to the Cath Lab where a focal subtotal 95% right coronary lesion was noted.  The mid and distal vessel had no significant narrowing, but there was a long tubular 40% stenosis in the mid right coronary artery.  He underwent stenting of his proximal RCA with brisk DEANNA 3 flow in the mid and distal area.  He was placed on Brilinta 90 mg b.i.d.  He has remained on that medication with aspirin since.  Cardiac risk factors include hypertension, hyperlipidemia and obesity.  His blood pressure has been well controlled with lisinopril 40 mg a day and metoprolol 25 mg twice daily.  His last cholesterol was checked last fall.  His total cholesterol was 157, HDL 55, LDL 65, triglycerides 181.  Today he reports he has felt very tired.  He says he runs out of energy fast.  He says after walking a couple of blocks, he gets short of breath.  He has put on some weight, and now he finds it difficult to lose.  According to our scale, his weight is up 13 pounds since we saw him about a year ago.  He does report shortness of breath when he tries to push himself along with some chest pressure.  He denies lightheadedness, dizziness.  He reports no PND.      He stated he last saw his primary care doctor last November.  He reports no new health problems.      PHYSICAL EXAMINATION:   GENERAL:  The patient is alert and oriented.   SKIN:  Warm and dry.   VITAL SIGNS:  Blood pressure is 122/64, pulse is 86, weight today is 266 pounds.  Height is 5 feet 7 inches tall.  BMI is 41.76.   CARDIAC:  Heart tones are distant but regular with an S1, S2 without an S3, S4.   LUNGS:  Clear without crackles or  wheezes.   ABDOMEN:  Soft but obese.   EXTREMITIES:  Lower extremities are free of edema.      IMPRESSION AND PLAN:   1.  Patient with a history of coronary artery disease, status post PCI with drug-eluting stent to his RCA about a year ago.  He remains both on aspirin and Brilinta.  Today he is reporting feeling very tired, sluggish with progressive dyspnea with exertion.  The patient does not know if is his age, having gained weight or if he is having a heart problem.  He is concerned about his stent.  Today I offered to do a nuclear stress test so we can evaluate the inferior wall to evaluate for further ischemia.  The patient will be set up for this stress test.  I will call him up to review the results.   2.  History of hyperlipidemia.  His total cholesterol is well managed with atorvastatin 40 mg per day.   3.  History of hypertension.  His blood pressure looks well managed today.  If the stress test does not give us any answers of his fatigue and shortness of breath, he may benefit from the WEL Program through [place] for a structured exercise program.  He did complete the cardiac Phase II Program last summer.  I also will steer him towards his primary care doctor to see if there is any other causes of his fatigue.      It has been my pleasure to see Darryl again.      Anette Forbes MS, ANP         ZACHERY MATHEWS, CNP             D: 2018   T: 2018   MT: DU      Name:     DARRYL DALY   MRN:      [id number]        Account:      [id number]   :      1960           Service Date: 2018      Document: [id number]

## 2018-07-25 ENCOUNTER — APPOINTMENT (OUTPATIENT)
Dept: GENERAL RADIOLOGY | Facility: CLINIC | Age: 58
DRG: 247 | End: 2018-07-25
Attending: EMERGENCY MEDICINE
Payer: COMMERCIAL

## 2018-07-25 ENCOUNTER — HOSPITAL ENCOUNTER (INPATIENT)
Facility: CLINIC | Age: 58
LOS: 3 days | Discharge: HOME | DRG: 247 | End: 2018-07-28
Attending: EMERGENCY MEDICINE | Admitting: INTERNAL MEDICINE
Payer: COMMERCIAL

## 2018-07-25 ENCOUNTER — HOSPITAL ENCOUNTER (OUTPATIENT)
Dept: CARDIOLOGY | Facility: CLINIC | Age: 58
Discharge: HOME | End: 2018-07-25
Attending: NURSE PRACTITIONER | Admitting: NURSE PRACTITIONER
Payer: COMMERCIAL

## 2018-07-25 ENCOUNTER — DOCUMENTATION ONLY (OUTPATIENT)
Dept: CARDIOLOGY | Facility: CLINIC | Age: 58
End: 2018-07-25

## 2018-07-25 DIAGNOSIS — I21.4 NSTEMI (NON-ST ELEVATED MYOCARDIAL INFARCTION) (H): ICD-10-CM

## 2018-07-25 DIAGNOSIS — I24.9 ACS (ACUTE CORONARY SYNDROME) (H): ICD-10-CM

## 2018-07-25 DIAGNOSIS — R06.02 SOB (SHORTNESS OF BREATH): ICD-10-CM

## 2018-07-25 DIAGNOSIS — E78.2 MIXED HYPERLIPIDEMIA: ICD-10-CM

## 2018-07-25 DIAGNOSIS — I10 BENIGN ESSENTIAL HYPERTENSION: ICD-10-CM

## 2018-07-25 DIAGNOSIS — I20.0 UNSTABLE ANGINA (H): Primary | ICD-10-CM

## 2018-07-25 LAB
ANION GAP SERPL CALCULATED.3IONS-SCNC: 8 MMOL/L (ref 3–14)
APTT PPP: 26 SEC (ref 22–37)
APTT PPP: NORMAL SEC (ref 22–37)
BASOPHILS # BLD AUTO: 0 10E9/L (ref 0–0.2)
BASOPHILS NFR BLD AUTO: 0.2 %
BUN SERPL-MCNC: 18 MG/DL (ref 7–30)
CALCIUM SERPL-MCNC: 9 MG/DL (ref 8.5–10.1)
CHLORIDE SERPL-SCNC: 106 MMOL/L (ref 94–109)
CO2 SERPL-SCNC: 25 MMOL/L (ref 20–32)
CREAT SERPL-MCNC: 0.96 MG/DL (ref 0.66–1.25)
DIFFERENTIAL METHOD BLD: ABNORMAL
EOSINOPHIL # BLD AUTO: 0.1 10E9/L (ref 0–0.7)
EOSINOPHIL NFR BLD AUTO: 1.6 %
ERYTHROCYTE [DISTWIDTH] IN BLOOD BY AUTOMATED COUNT: 12.7 % (ref 10–15)
GFR SERPL CREATININE-BSD FRML MDRD: 81 ML/MIN/1.7M2
GLUCOSE SERPL-MCNC: 93 MG/DL (ref 70–99)
HCT VFR BLD AUTO: 37.9 % (ref 40–53)
HGB BLD-MCNC: 13.1 G/DL (ref 13.3–17.7)
HGB BLD-MCNC: 14.2 G/DL (ref 13.3–17.7)
IMM GRANULOCYTES # BLD: 0 10E9/L (ref 0–0.4)
IMM GRANULOCYTES NFR BLD: 0.2 %
INR PPP: 1.09 (ref 0.86–1.14)
INR PPP: NORMAL (ref 0.86–1.14)
INTERPRETATION ECG - MUSE: NORMAL
LYMPHOCYTES # BLD AUTO: 1.4 10E9/L (ref 0.8–5.3)
LYMPHOCYTES NFR BLD AUTO: 27.6 %
MCH RBC QN AUTO: 30.3 PG (ref 26.5–33)
MCHC RBC AUTO-ENTMCNC: 34.6 G/DL (ref 31.5–36.5)
MCV RBC AUTO: 88 FL (ref 78–100)
MONOCYTES # BLD AUTO: 0.3 10E9/L (ref 0–1.3)
MONOCYTES NFR BLD AUTO: 6.7 %
NEUTROPHILS # BLD AUTO: 3.1 10E9/L (ref 1.6–8.3)
NEUTROPHILS NFR BLD AUTO: 63.7 %
NRBC # BLD AUTO: 0 10*3/UL
NRBC BLD AUTO-RTO: 0 /100
PLATELET # BLD AUTO: 106 10E9/L (ref 150–450)
POTASSIUM SERPL-SCNC: 4.5 MMOL/L (ref 3.4–5.3)
RBC # BLD AUTO: 4.33 10E12/L (ref 4.4–5.9)
SODIUM SERPL-SCNC: 139 MMOL/L (ref 133–144)
TROPONIN I SERPL-MCNC: 0.02 UG/L (ref 0–0.04)
TROPONIN I SERPL-MCNC: 0.03 UG/L (ref 0–0.04)
WBC # BLD AUTO: 4.9 10E9/L (ref 4–11)

## 2018-07-25 PROCEDURE — 25000132 ZZH RX MED GY IP 250 OP 250 PS 637: Performed by: INTERNAL MEDICINE

## 2018-07-25 PROCEDURE — 93005 ELECTROCARDIOGRAM TRACING: CPT

## 2018-07-25 PROCEDURE — 21000001 ZZH R&B HEART CARE

## 2018-07-25 PROCEDURE — 85730 THROMBOPLASTIN TIME PARTIAL: CPT | Performed by: EMERGENCY MEDICINE

## 2018-07-25 PROCEDURE — 80048 BASIC METABOLIC PNL TOTAL CA: CPT | Performed by: EMERGENCY MEDICINE

## 2018-07-25 PROCEDURE — 85025 COMPLETE CBC W/AUTO DIFF WBC: CPT | Performed by: EMERGENCY MEDICINE

## 2018-07-25 PROCEDURE — 93017 CV STRESS TEST TRACING ONLY: CPT

## 2018-07-25 PROCEDURE — 25000128 H RX IP 250 OP 636: Performed by: EMERGENCY MEDICINE

## 2018-07-25 PROCEDURE — 36415 COLL VENOUS BLD VENIPUNCTURE: CPT | Performed by: INTERNAL MEDICINE

## 2018-07-25 PROCEDURE — 99285 EMERGENCY DEPT VISIT HI MDM: CPT | Mod: 25

## 2018-07-25 PROCEDURE — 84484 ASSAY OF TROPONIN QUANT: CPT | Performed by: EMERGENCY MEDICINE

## 2018-07-25 PROCEDURE — 25000128 H RX IP 250 OP 636: Performed by: INTERNAL MEDICINE

## 2018-07-25 PROCEDURE — 84484 ASSAY OF TROPONIN QUANT: CPT | Performed by: INTERNAL MEDICINE

## 2018-07-25 PROCEDURE — 85610 PROTHROMBIN TIME: CPT | Performed by: EMERGENCY MEDICINE

## 2018-07-25 PROCEDURE — 34300033 ZZH RX 343: Performed by: NURSE PRACTITIONER

## 2018-07-25 PROCEDURE — 85018 HEMOGLOBIN: CPT | Performed by: NURSE PRACTITIONER

## 2018-07-25 PROCEDURE — 96365 THER/PROPH/DIAG IV INF INIT: CPT

## 2018-07-25 PROCEDURE — 71046 X-RAY EXAM CHEST 2 VIEWS: CPT

## 2018-07-25 PROCEDURE — 99223 1ST HOSP IP/OBS HIGH 75: CPT | Mod: AI | Performed by: INTERNAL MEDICINE

## 2018-07-25 PROCEDURE — 96376 TX/PRO/DX INJ SAME DRUG ADON: CPT

## 2018-07-25 PROCEDURE — A9502 TC99M TETROFOSMIN: HCPCS | Performed by: NURSE PRACTITIONER

## 2018-07-25 RX ORDER — NITROGLYCERIN 20 MG/100ML
.07-1.68 INJECTION INTRAVENOUS CONTINUOUS
Status: DISCONTINUED | OUTPATIENT
Start: 2018-07-25 | End: 2018-07-26

## 2018-07-25 RX ORDER — NALOXONE HYDROCHLORIDE 0.4 MG/ML
.1-.4 INJECTION, SOLUTION INTRAMUSCULAR; INTRAVENOUS; SUBCUTANEOUS
Status: DISCONTINUED | OUTPATIENT
Start: 2018-07-25 | End: 2018-07-28 | Stop reason: HOSPADM

## 2018-07-25 RX ORDER — ASPIRIN 325 MG
325 TABLET ORAL DAILY
Status: DISCONTINUED | OUTPATIENT
Start: 2018-07-26 | End: 2018-07-25

## 2018-07-25 RX ORDER — VENLAFAXINE 37.5 MG/1
37.5 TABLET ORAL 2 TIMES DAILY
Status: DISCONTINUED | OUTPATIENT
Start: 2018-07-25 | End: 2018-07-28 | Stop reason: HOSPADM

## 2018-07-25 RX ORDER — METOPROLOL TARTRATE 25 MG/1
25 TABLET, FILM COATED ORAL 2 TIMES DAILY
Status: DISCONTINUED | OUTPATIENT
Start: 2018-07-25 | End: 2018-07-27

## 2018-07-25 RX ORDER — ATORVASTATIN CALCIUM 40 MG/1
40 TABLET, FILM COATED ORAL DAILY
Status: DISCONTINUED | OUTPATIENT
Start: 2018-07-26 | End: 2018-07-28 | Stop reason: HOSPADM

## 2018-07-25 RX ORDER — ACETAMINOPHEN 650 MG/1
650 SUPPOSITORY RECTAL EVERY 4 HOURS PRN
Status: DISCONTINUED | OUTPATIENT
Start: 2018-07-25 | End: 2018-07-28 | Stop reason: HOSPADM

## 2018-07-25 RX ORDER — ASPIRIN 81 MG/1
81 TABLET, CHEWABLE ORAL EVERY MORNING
Status: DISCONTINUED | OUTPATIENT
Start: 2018-07-26 | End: 2018-07-26

## 2018-07-25 RX ORDER — NITROGLYCERIN 0.4 MG/1
0.4 TABLET SUBLINGUAL EVERY 5 MIN PRN
Status: DISCONTINUED | OUTPATIENT
Start: 2018-07-25 | End: 2018-07-28 | Stop reason: HOSPADM

## 2018-07-25 RX ORDER — NITROGLYCERIN 0.4 MG/1
0.4 TABLET SUBLINGUAL EVERY 5 MIN PRN
Status: DISCONTINUED | OUTPATIENT
Start: 2018-07-25 | End: 2018-07-25

## 2018-07-25 RX ORDER — SODIUM CHLORIDE 9 MG/ML
INJECTION, SOLUTION INTRAVENOUS CONTINUOUS
Status: DISCONTINUED | OUTPATIENT
Start: 2018-07-25 | End: 2018-07-27

## 2018-07-25 RX ORDER — ASPIRIN 81 MG/1
324 TABLET, CHEWABLE ORAL ONCE
Status: COMPLETED | OUTPATIENT
Start: 2018-07-25 | End: 2018-07-25

## 2018-07-25 RX ORDER — LISINOPRIL 40 MG/1
40 TABLET ORAL AT BEDTIME
Status: DISCONTINUED | OUTPATIENT
Start: 2018-07-25 | End: 2018-07-28 | Stop reason: HOSPADM

## 2018-07-25 RX ORDER — ACETAMINOPHEN 325 MG/1
650 TABLET ORAL EVERY 4 HOURS PRN
Status: DISCONTINUED | OUTPATIENT
Start: 2018-07-25 | End: 2018-07-26

## 2018-07-25 RX ORDER — ALUMINA, MAGNESIA, AND SIMETHICONE 2400; 2400; 240 MG/30ML; MG/30ML; MG/30ML
30 SUSPENSION ORAL EVERY 4 HOURS PRN
Status: DISCONTINUED | OUTPATIENT
Start: 2018-07-25 | End: 2018-07-28 | Stop reason: HOSPADM

## 2018-07-25 RX ORDER — LIDOCAINE 40 MG/G
CREAM TOPICAL
Status: DISCONTINUED | OUTPATIENT
Start: 2018-07-25 | End: 2018-07-28 | Stop reason: HOSPADM

## 2018-07-25 RX ADMIN — Medication 5250 UNITS: at 18:27

## 2018-07-25 RX ADMIN — LISINOPRIL 40 MG: 40 TABLET ORAL at 22:12

## 2018-07-25 RX ADMIN — TICAGRELOR 90 MG: 90 TABLET ORAL at 22:12

## 2018-07-25 RX ADMIN — ACETAMINOPHEN 650 MG: 325 TABLET, FILM COATED ORAL at 21:33

## 2018-07-25 RX ADMIN — ASPIRIN 81 MG 324 MG: 81 TABLET ORAL at 20:52

## 2018-07-25 RX ADMIN — METOPROLOL TARTRATE 25 MG: 25 TABLET ORAL at 22:12

## 2018-07-25 RX ADMIN — SODIUM CHLORIDE, PRESERVATIVE FREE: 5 INJECTION INTRAVENOUS at 21:27

## 2018-07-25 RX ADMIN — OMEPRAZOLE 20 MG: 20 CAPSULE, DELAYED RELEASE ORAL at 22:12

## 2018-07-25 RX ADMIN — VENLAFAXINE 37.5 MG: 37.5 TABLET ORAL at 22:38

## 2018-07-25 RX ADMIN — TETROFOSMIN 9.24 MCI.: 1.38 INJECTION, POWDER, LYOPHILIZED, FOR SOLUTION INTRAVENOUS at 13:30

## 2018-07-25 RX ADMIN — HEPARIN SODIUM 1450 UNITS/HR: 10000 INJECTION, SOLUTION INTRAVENOUS at 18:27

## 2018-07-25 ASSESSMENT — ACTIVITIES OF DAILY LIVING (ADL): ADLS_ACUITY_SCORE: 11

## 2018-07-25 ASSESSMENT — ENCOUNTER SYMPTOMS
DIAPHORESIS: 1
CHEST TIGHTNESS: 1
NAUSEA: 0
SHORTNESS OF BREATH: 1
VOMITING: 0
FATIGUE: 1

## 2018-07-25 NOTE — IP AVS SNAPSHOT
MRN:[id number]                      After Visit Summary   7/25/2018    Darryl Uribe    MRN: [id number]           Thank you!     Thank you for choosing [facility] for your care. Our goal is always to provide you with excellent care. Hearing back from our patients is one way we can continue to improve our services. Please take a few minutes to complete the written survey that you may receive in the mail after you visit with us. Thank you!        Patient Information     Date Of Birth          1960        Designated Caregiver       Most Recent Value    Caregiver    Will someone help with your care after discharge? yes    Name of designated caregiver Jimmy Lim    Phone number of caregiver [phone]    Caregiver address [address] [facility] [address]      About your hospital stay     You were admitted on:  July 25, 2018 You last received care in the:  [facility]    You were discharged on:  July 28, 2018        Reason for your hospital stay       This is a 58 year old male admitted with chest tightness.                  Who to Call     For medical emergencies, please call 911.  For non-urgent questions about your medical care, please call your primary care provider or clinic, [phone]          Attending Provider     Provider Specialty    Ines Danielson MD Emergency Medicine    [facility]       Primary Care Provider Office Phone # Fax #    Oseas Correa [phone]      After Care Instructions     Activity       Your activity upon discharge: activity as tolerated            Diet       Follow this diet upon discharge: Orders Placed This Encounter      Low Salt Low Saturated Fat Diet                  Follow-up Appointments     Follow-up and recommended labs and tests        Follow up with Cardiology as directed.                  Your next 10 appointments already scheduled     Jul 28, 2018  1:30 PM CDT    IP Cardiac Rehab Satellite Treatment with Shahnaz Batista, OT   [facility] ([facility])    [address]  [place] [address]   [phone]            Jul 29, 2018  8:45 AM CDT   IP Cardiac Rehab Satellite Treatment with Shahnaz Batista, OT   [facility] ([facility])    [address]  [place] [address]   [phone]            Jul 29, 2018  6:30 PM CDT   IP Cardiac Rehab Satellite Treatment with Shahnaz Batista, OT   [facility] ([facility])    [address]  [place] [address]   [phone]            Aug 06, 2018  2:10 PM CDT   LAB with [facility]   [facility] ([facility])    [address] [place] [address] [address]  [facility] [address]   [phone]           Please do not eat 10-12 hours before your appointment if you are coming in fasting for labs on lipids, cholesterol, or glucose (sugar). This does not apply to pregnant women. Water, hot tea and black coffee (with nothing added) are okay. Do not drink other fluids, diet soda or chew gum.            Aug 06, 2018  3:10 PM CDT   Return Discharge with Natalie Ballesteros PA-C   [facility] ([facility])    [address] [place] [address] [address]  [facility] [address] [phone] OPT 2            Oct 22, 2018 11:15 AM CDT   New Visit with Landon Amaya MD   [facility] ([facility])    [address] [place] [address] [address]  [facility] [address] [phone] OPT 2              Additional Services     CARDIAC REHAB REFERRAL       *This therapy referral will be filtered to a centralized scheduling office at [facility] and the patient will receive a call to schedule an appointment at a [facility] location most convenient for them.*     If you  "have not heard from the scheduling office within 2 business days, please call [phone] for all locations, with the exception of [place], please call [phone].    Please be aware that coverage of these services is subject to the terms and limitations of your health insurance plan.  Call member services at your health plan with any benefit or coverage questions.      **Note to Provider:  If you are referring outside of [facility] for the therapy appointment, please list the name of the location in the \"special instructions\" above, print the referral and give to the patient to schedule the appointment.                   Follow-Up with Cardiac Advanced Practice Provider           Follow-Up with Cardiac Advanced Practice Provider           Follow-Up with Cardiologist                 Future tests that were ordered for you     Basic metabolic panel           Basic metabolic panel           CBC with platelets       Last Lab Result: Hemoglobin (g/dL)       Date                     Value                 07/27/2018               12.0 (L)         ----------            EKG 12-lead complete w/read  (to be scheduled)                 Pending Results     Date and Time Order Name Status Description    7/27/2018 1726 EKG 12-lead, tracing only  Preliminary     7/26/2018 1004 EKG 12-lead, tracing only Preliminary     7/25/2018 1243 NM MPI Single Rest Or Stress In process             Statement of Approval     Ordered          07/28/18 0942  I have reviewed and agree with all the recommendations and orders detailed in this document.  EFFECTIVE NOW     Approved and electronically signed by:  Reddy Rizzo MD             Admission Information     Date & Time Provider Department Dept. Phone    7/25/2018 Hadley Alvarado MD [facility] [phone]      Your Vitals Were     Blood Pressure Pulse Temperature Respirations Height Weight    120/83 (BP Location: Left arm) 99 97.8  F (36.6  C) " "(Oral) 18 1.702 m (5' 7\") 120 kg (264 lb 9.6 oz)    Pulse Oximetry BMI (Body Mass Index)                96% 41.44 kg/m2          [facility] Information     [facility] gives you secure access to your electronic health record. If you see a primary care provider, you can also send messages to your care team and make appointments. If you have questions, please call your primary care clinic.  If you do not have a primary care provider, please call [phone] and they will assist you.        Care EveryWhere ID     This is your Care EveryWhere ID. This could be used by other organizations to access your [facility] medical records  [id number]        Equal Access to Services     GARRETT SOSA : Jerzy Pike, franco galdamez, dmitriy cabrera, ting sharma. So [facility] [phone].    ATENCIÓN: Si habla español, tiene a kincaid disposición servicios gratuitos de asistencia lingüística. Llame al [phone].    We comply with applicable federal civil rights laws and [place] laws. We do not discriminate on the basis of race, color, national origin, age, disability, sex, sexual orientation, or gender identity.               Review of your medicines      START taking        Dose / Directions    isosorbide mononitrate 30 MG 24 hr tablet   Commonly known as:  IMDUR   Used for:  Unstable angina (H)        Dose:  30 mg   Start taking on:  7/29/2018   Take 1 tablet (30 mg) by mouth daily   Quantity:  30 tablet   Refills:  3         CONTINUE these medicines which have NOT CHANGED        Dose / Directions    ASPIRIN PO        Dose:  81 mg   Take 81 mg by mouth every morning   Refills:  0       atorvastatin 40 MG tablet   Commonly known as:  LIPITOR   Used for:  NSTEMI (non-ST elevated myocardial infarction) (H)        Dose:  40 mg   Take 1 tablet (40 mg) by mouth daily   Quantity:  30 tablet   Refills:  0       lisinopril 40 MG tablet   Commonly known as:  PRINIVIL/ZESTRIL   Used for:  NSTEMI " (non-ST elevated myocardial infarction) (H)        Dose:  40 mg   Take 1 tablet (40 mg) by mouth At Bedtime   Quantity:  30 tablet   Refills:  0       metoprolol tartrate 25 MG tablet   Commonly known as:  LOPRESSOR   Used for:  NSTEMI (non-ST elevated myocardial infarction) (H)        Dose:  25 mg   Take 1 tablet (25 mg) by mouth 2 times daily   Quantity:  60 tablet   Refills:  0       nitroGLYcerin 0.4 MG sublingual tablet   Commonly known as:  NITROSTAT   Used for:  Postsurgical percutaneous transluminal coronary angioplasty status, NSTEMI (non-ST elevated myocardial infarction) (H), Mixed hyperlipidemia        For chest pain place 1 tablet under the tongue every 5 minutes for 3 doses. If symptoms persist 5 minutes after 1st dose call 911.   Quantity:  25 tablet   Refills:  1       omeprazole 20 MG CR capsule   Commonly known as:  priLOSEC        Dose:  20 mg   Take 20 mg by mouth 2 times daily   Refills:  0       ticagrelor 90 MG tablet   Commonly known as:  BRILINTA        Dose:  90 mg   Take 1 tablet (90 mg) by mouth 2 times daily Filled on 7/10/18 for 30 days supply (60 tabs), no refills.   Quantity:  60 tablet   Refills:  5       venlafaxine 37.5 MG tablet   Commonly known as:  EFFEXOR        Dose:  37.5 mg   Take 37.5 mg by mouth 2 times daily   Refills:  0       VITAMIN D (CHOLECALCIFEROL) PO        Dose:  400 Units   Take 400 Units by mouth daily   Refills:  0            Where to get your medicines      These medications were sent to [place] [address] [place] [facility] [address]  [address] [facility] [address]     Phone:  [phone]     isosorbide mononitrate 30 MG 24 hr tablet    ticagrelor 90 MG tablet                Protect others around you: Learn how to safely use, store and throw away your medicines at www.disposemymeds.org.             Medication List: This is a list of all your medications and when to take them. Check marks below indicate your daily home schedule. Keep  this list as a reference.      Medications           Morning Afternoon Evening Bedtime As Needed    ASPIRIN PO   Take 81 mg by mouth every morning   Last time this was given:  81 mg on 7/28/2018  8:47 AM                                   atorvastatin 40 MG tablet   Commonly known as:  LIPITOR   Take 1 tablet (40 mg) by mouth daily   Last time this was given:  40 mg on 7/28/2018  8:46 AM                                   isosorbide mononitrate 30 MG 24 hr tablet   Commonly known as:  IMDUR   Take 1 tablet (30 mg) by mouth daily   Start taking on:  7/29/2018   Last time this was given:  30 mg on 7/28/2018  8:47 AM                                   lisinopril 40 MG tablet   Commonly known as:  PRINIVIL/ZESTRIL   Take 1 tablet (40 mg) by mouth At Bedtime   Last time this was given:  40 mg on 7/27/2018  9:24 PM                                   metoprolol tartrate 25 MG tablet   Commonly known as:  LOPRESSOR   Take 1 tablet (25 mg) by mouth 2 times daily   Last time this was given:  25 mg on 7/28/2018  8:47 AM                                      nitroGLYcerin 0.4 MG sublingual tablet   Commonly known as:  NITROSTAT   For chest pain place 1 tablet under the tongue every 5 minutes for 3 doses. If symptoms persist 5 minutes after 1st dose call 911.                                omeprazole 20 MG CR capsule   Commonly known as:  priLOSEC   Take 20 mg by mouth 2 times daily   Last time this was given:  20 mg on 7/28/2018  8:47 AM                                   ticagrelor 90 MG tablet   Commonly known as:  BRILINTA   Take 1 tablet (90 mg) by mouth 2 times daily Filled on 7/10/18 for 30 days supply (60 tabs), no refills.   Last time this was given:  90 mg on 7/28/2018  8:46 AM                                      venlafaxine 37.5 MG tablet   Commonly known as:  EFFEXOR   Take 37.5 mg by mouth 2 times daily   Last time this was given:  37.5 mg on 7/28/2018  8:47 AM                                      VITAMIN D  (CHOLECALCIFEROL) PO   Take 400 Units by mouth daily                                             More Information        Coronary Angiography     The catheter can be placed into the groin, arm, or wrist.   Angiography is a special type of X-ray that lets your doctor view your coronary arteries to see if the blood vessels to your heart are narrowed or blocked.   Before the procedure    Tell your doctor what medicines you take and any allergies you may have.    Tell your doctor if you've had a reaction to contrast dye or have had any kidney problems.    Don t eat or drink anything for at least 6 to 8 hours before the procedure. You will likely be told not to have anything after midnight, the night before the procedure.    A nurse will place an IV catheter in your vein to give fluids, and medicine to relieve pain and help you feel less anxious.    He or she will clean your skin and, if necessary, shave the area where the catheter will be inserted.  During the procedure    Your doctor will place a long, thin tube called a catheter inside an artery in your groin or arm and guide it into your heart.    He or she will inject a contrast dye through the catheter into your blood vessels or heart chambers.    X-rays are taken to show images of the inside of your heart and coronary arteries.  After the procedure      Your doctor or nurse will tell you how long to lie down and keep the insertion site still.    If the insertion site was in your groin, you may need to lie down with your leg still for several hours. If bleeding occurs, a nurse will apply pressure to the area to control it.    A nurse will check your blood pressure and the insertion site frequently to make sure you remain stable after the procedure.    You may be asked to drink fluid to help flush the contrast liquid out of your system.    Have someone drive you home from the hospital.    If your doctor uses angioplasty to treat a blocked artery, you will stay the  night in the hospital.    It s normal to find a small bruise or lump at the insertion site. The lump may be the collagen plug or stitch that you feel, or a small bruise. These common side effects should disappear within a few weeks.  When to call your healthcare provider  Contact your healthcare provider right away if you have any of these:    Chest pain    Pain, swelling, redness, bleeding, or drainage at the insertion site    Severe pain, coldness, or a bluish color in the leg or arm that held the catheter    Fever over 100.4 F (38 C)   Date Last Reviewed: 6/1/2016 2000-2017 [employer]. [address] [place] [address]. All rights reserved. This information is not intended as a substitute for professional medical care. Always follow your healthcare professional's instructions.

## 2018-07-25 NOTE — PROGRESS NOTES
Patient here today for Nuclear stress test for recurring symptoms. Patient stated that he has been becoming more and more SOB with exertion, and unable to cut the grass. He was not having symptoms at rest. His stress test was started and the resting images completed. However he complained of slight pressure in his chest prior to performing the treadmill stress. It was brought to Dr Llanos's attention along with his EKG. Dr Llanos spoke with patient and it was determined that it is more clinically appropriate to have the patient evaluated in the ER and possibly admitted to perform an angiogram instead. Dr Amaya's nurse was called and she accompanied the patient to the ER.

## 2018-07-25 NOTE — IP AVS SNAPSHOT
[facility]    [address] [place] [address]., [address]    [place] [address]    Phone:  [phone]                                       After Visit Summary   7/25/2018    Darryl Uribe    MRN: [id number]           After Visit Summary Signature Page     I have received my discharge instructions, and my questions have been answered. I have discussed any challenges I see with this plan with the nurse or doctor.    ..........................................................................................................................................  Patient/Patient Representative Signature      ..........................................................................................................................................  Patient Representative Print Name and Relationship to Patient    ..................................................               ................................................  Date                                            Time    ..........................................................................................................................................  Reviewed by Signature/Title    ...................................................              ..............................................  Date                                                            Time

## 2018-07-25 NOTE — ED PROVIDER NOTES
History     Chief Complaint:  Chest Pain     HPI   Darryl Uribe is a 58 year old male with a history of CAD, NSTEMI, and hypertension who presents to the emergency department today for evaluation of chest pain. The patient has a history of NSTEMI with subsequent stent placement in 6/2017. Over the past several weeks/months the patient has been experiencing increasing chest pain and shortness of breath for which he was scheduled for a nuclear stress test today. However, on arrival the patient complained of chest tightness and was referred to the ED for serial troponin and admission with angiogram tomorrow.     Here in the ED the patient reports he has been experiencing chest pain and tightness, shortness of breath, diaphoresis, and fatigue exacerbated with exertion for some time, which he attributed to the heat and humidity. However, he states that this has worsened, is present when sitting, and is now is as severe as before his NSTEMI. The patient denies nausea or vomiting.     Allergies:  No Known Drug Allergies    Medications:    Lipitor  Prinivil  Lopressor  Nitrostat  Prilosec  Effexor    Past Medical History:    CAD  NSTEMI  Generalized anxiety disorder  Hyperlipidemia  Hypertension  Ulcerative colitis    Past Surgical History:    Surgical history reviewed. No pertinent surgical history.    Family History:    Mother: Myocardial Infarction     Social History:  Smoking Status: Never Smoker  Smokeless Tobacco: Never Used  Alcohol Use: Negative (2 drinks per year)  Marital Status:     Review of Systems   Constitutional: Positive for diaphoresis and fatigue.   Respiratory: Positive for chest tightness and shortness of breath.    Cardiovascular: Positive for chest pain.   Gastrointestinal: Negative for nausea and vomiting.   All other systems reviewed and are negative.    Physical Exam     Patient Vitals for the past 24 hrs:   BP Temp Temp src Pulse Resp SpO2 Height Weight   07/25/18 1558 155/89 98.5  F  "(36.9  C) Oral 77 18 97 % 1.702 m (5' 7\") 118.8 kg (262 lb)     Physical Exam  General/Appearance: appears stated age, well-groomed, appears comfortable  Eyes: EOMI, no scleral injection, no icterus  ENT: MMM  Neck: supple, nl ROM, no stiffness  Cardiovascular: RRR, nl S1S2, no m/r/g, 2+ pulses in all 4 extremities, cap refill <2sec  Respiratory: CTAB, good air movement throughout, no wheezes/rhonchi/rales, no increased WOB, no retractions  Back: no lesions  GI: abd soft, non-distended, nttp,  no HSM, no rebound, no guarding, nl BS  MSK: DAN, good tone, no bony abnormality  Skin: warm and well-perfused, no rash, no edema, no ecchymosis, nl turgor  Neuro: GCS 15, alert and oriented, no gross focal neuro deficits  Psych: interacts appropriately  Heme: no petechia, no purpura, no active bleeding        Emergency Department Course     ECG:  ECG taken at 1602, ECG read at 1615  Normal sinus rhythm  Normal ECG  Rate 78 bpm. NH interval 180 ms. QRS duration 96 ms. QT/QTc 360/410 ms. P-R-T axes 56 20 20.    Imaging:  Radiology findings were communicated with the patient who voiced understanding of the findings.    XR Chest 2 Views  Heart size is normal. Pulmonary vasculature is normal.  Lungs are clear. No pleural fluid. No acute disease. No significant change.  GIANNI CISNEROS MD  Reading per radiology    Laboratory:  Laboratory findings were communicated with the patient who voiced understanding of the findings.    PTT: 26  INR: 1.09  CBC: WBC 4.9, HGB 13.1 (L),  (L)  BMP: WNL (Creatinine 0.96)  Troponin (Collected: 1706): 0.026    Interventions:  1827 Heparin Loading Dose 1827 5250 Units IV  1827 Heparin Infusion 1450 Units/hr IV    Emergency Department Course:    1602 EKG obtained    1623 Nursing notes and vitals reviewed.    1631 I performed an exam of the patient as documented above.     1706 IV was inserted and blood was drawn for laboratory testing, results above.    1733 The patient was sent for a chest xray " while in the emergency department, results above.     1736 IV was inserted and blood was drawn for laboratory testing, results above.    1805 I spoke with Dr. Alvarado of the hospitalist service from [facility] regarding patient's presentation, findings, and plan of care.    1940 I personally reviewed the laboratory and imaging results with the patient and answered all related questions prior to admission.    Impression & Plan      Medical Decision Making:  Darryl Uribe is a 58 year old male with a history of an STEMI status post stent who presents with several weeks to months of increasing chest pain, shortness of breath, reminiscent of the symptoms that prompted him to have the stent in the first place.  He was going to have a nuclear stress test today however due to his chest tightness at the time of the study they decided to forego it.  They have requested that he come to the ED, get serial troponins, and to be brought into the hospital for an angiogram tomorrow.  He has been started on heparin here due to the unstable angina.  Otherwise he is relatively asymptomatic.    Diagnosis:    ICD-10-CM    1. Unstable angina (H) I20.0 PTT     INR     Disposition:   The patient is admitted into the care of Dr. Alvarado.    Scribe Disclosure:  I, Arianne Rodriguez, am serving as a scribe at 5:54 PM on 7/25/2018 to document services personally performed by Ines Danielson based on my observations and the provider's statements to me.     EMERGENCY DEPARTMENT       Ines Danielson MD  07/25/18 2020

## 2018-07-25 NOTE — PROGRESS NOTES
Per Dr. Llanos's recommendation: patient transported from nuclear department to ER via wheelchair.

## 2018-07-25 NOTE — PHARMACY-ADMISSION MEDICATION HISTORY
Admission medication history interview status for the 7/25/2018  admission is complete. See EPIC admission navigator for prior to admission medications     Medication history source reliability:Good    Actions taken by pharmacist (provider contacted, etc): Interviewed patient.      Additional medication history information not noted on PTA med list :None    Medication reconciliation/reorder completed by provider prior to medication history? No    Time spent in this activity: 20 minutes    Prior to Admission medications    Medication Sig Last Dose Taking? Auth Provider   ASPIRIN PO Take 81 mg by mouth every morning  7/25/2018 at AM Yes Unknown, Entered By History   atorvastatin (LIPITOR) 40 MG tablet Take 1 tablet (40 mg) by mouth daily 7/25/2018 at AM Yes Tita Mata MD   lisinopril (PRINIVIL/ZESTRIL) 40 MG tablet Take 1 tablet (40 mg) by mouth At Bedtime 7/24/2018 at HS Yes Tita Mata MD   metoprolol (LOPRESSOR) 25 MG tablet Take 1 tablet (25 mg) by mouth 2 times daily 7/23/2018 at PM Yes Tita Mata MD   omeprazole (PRILOSEC) 20 MG CR capsule Take 20 mg by mouth 2 times daily 7/25/2018 at AM Yes Unknown, Entered By History   Ticagrelor (BRILINTA PO) Take 90 mg by mouth 2 times daily 7/25/2018 at AM Yes Unknown, Entered By History   venlafaxine (EFFEXOR) 37.5 MG tablet Take 37.5 mg by mouth 2 times daily 7/25/2018 at AM Yes Unknown, Entered By History   VITAMIN D, CHOLECALCIFEROL, PO Take 400 Units by mouth daily 7/23/2018 Yes Unknown, Entered By History   nitroGLYcerin (NITROSTAT) 0.4 MG sublingual tablet For chest pain place 1 tablet under the tongue every 5 minutes for 3 doses. If symptoms persist 5 minutes after 1st dose call 911. More than a month at Unknown time  Mike Tirado MD Sharon Chandler, PharmD, BCPS

## 2018-07-25 NOTE — ED NOTES
"[facility]  ED Nurse Handoff Report    ED Chief complaint: Chest Pain (stent for nstemi in 2017/ increasing cp and GOMEZ  with chest tightness today. was unable to do nuc stress test today because of pain. cards wants admit with serial trops and got to cath lab tomorrow for ango )      ED Diagnosis:   Final diagnoses:   Unstable angina (H)       Code Status: Full Code    Allergies: No Known Allergies    Activity level - Baseline/Home:  Independent    Activity Level - Current:   Independent     Needed?: No    Isolation: No  Infection: Not Applicable  Bariatric?: No    Vital Signs:   Vitals:    07/25/18 1558   BP: 155/89   Pulse: 77   Resp: 18   Temp: 98.5  F (36.9  C)   TempSrc: Oral   SpO2: 97%   Weight: 118.8 kg (262 lb)   Height: 1.702 m (5' 7\")       Cardiac Rhythm: ,   Cardiac  Cardiac Rhythm: Normal sinus rhythm    Pain level: 0-10 Pain Scale: 4    Is this patient confused?: No   [place] - Suicide Severity Rating Scale Completed?  Yes  If yes, what color did the patient score?  White    Patient Report: Initial Complaint: Darryl Uribe is a 58 year old male with a history of CAD, NSTEMI, and hypertension who presents to the emergency department today for evaluation of chest pain. The patient has a history of nstemi with subsequent stent placement in 2017. Since then, patient has been experiencing increasing chest pain and shortness of breath for which he was scheduled for a nuclear stress test today. However, on arrival the patient complained of chest tightness and was referred to the ED for serial troponin's and admission with agniogram tomorrow.      Here in the ED the patient reports he has been experiencing chest pain and tightness, shortness of breath, diaphoresis, and fatigue exacerbated with exertion for some time, which he attributed to the heat and humidity. However, he states that this has worsened, is present when sitting, and is now is as severe as before his nstemi. The " patient denies nausea or vomiting.   Focused Assessment: A/O x4, no chest pain currently, no abdominal pain, lungs clear. Denies SOB  Tests Performed: labs, ekg, chest xray  Abnormal Results:   Results for orders placed or performed during the hospital encounter of 07/25/18 (from the past 24 hour(s))   EKG 12 lead   Result Value Ref Range    Interpretation ECG Click View Image link to view waveform and result    CBC with platelets differential   Result Value Ref Range    WBC 4.9 4.0 - 11.0 10e9/L    RBC Count 4.33 (L) 4.4 - 5.9 10e12/L    Hemoglobin 13.1 (L) 13.3 - 17.7 g/dL    Hematocrit 37.9 (L) 40.0 - 53.0 %    MCV 88 78 - 100 fl    MCH 30.3 26.5 - 33.0 pg    MCHC 34.6 31.5 - 36.5 g/dL    RDW 12.7 10.0 - 15.0 %    Platelet Count 106 (L) 150 - 450 10e9/L    Diff Method Automated Method     % Neutrophils 63.7 %    % Lymphocytes 27.6 %    % Monocytes 6.7 %    % Eosinophils 1.6 %    % Basophils 0.2 %    % Immature Granulocytes 0.2 %    Nucleated RBCs 0 0 /100    Absolute Neutrophil 3.1 1.6 - 8.3 10e9/L    Absolute Lymphocytes 1.4 0.8 - 5.3 10e9/L    Absolute Monocytes 0.3 0.0 - 1.3 10e9/L    Absolute Eosinophils 0.1 0.0 - 0.7 10e9/L    Absolute Basophils 0.0 0.0 - 0.2 10e9/L    Abs Immature Granulocytes 0.0 0 - 0.4 10e9/L    Absolute Nucleated RBC 0.0    Basic metabolic panel   Result Value Ref Range    Sodium 139 133 - 144 mmol/L    Potassium 4.5 3.4 - 5.3 mmol/L    Chloride 106 94 - 109 mmol/L    Carbon Dioxide 25 20 - 32 mmol/L    Anion Gap 8 3 - 14 mmol/L    Glucose 93 70 - 99 mg/dL    Urea Nitrogen 18 7 - 30 mg/dL    Creatinine 0.96 0.66 - 1.25 mg/dL    GFR Estimate 81 >60 mL/min/1.7m2    GFR Estimate If Black >90 >60 mL/min/1.7m2    Calcium 9.0 8.5 - 10.1 mg/dL   Troponin I   Result Value Ref Range    Troponin I ES 0.026 0.000 - 0.045 ug/L   PTT   Result Value Ref Range    PTT Canceled, Test credited 22 - 37 sec   INR   Result Value Ref Range    INR Canceled, Test credited 0.86 - 1.14   PTT   Result Value Ref  Range    PTT 26 22 - 37 sec   INR   Result Value Ref Range    INR 1.09 0.86 - 1.14   XR Chest 2 Views    Narrative    CHEST TWO VIEWS    7/25/2018 6:12 PM     HISTORY: Chest pain.     COMPARISON: 6/14/2017 chest x-ray.      Impression    IMPRESSION: Heart size is normal. Pulmonary vasculature is normal.  Lungs are clear. No pleural fluid. No acute disease. No significant  change.     Treatments provided: heparin bolus and drip    Family Comments: none present    OBS brochure/video discussed/provided to patient: No    ED Medications:   Medications   nitroGLYcerin (NITROSTAT) sublingual tablet 0.4 mg (not administered)   heparin infusion 25,000 units in 0.45% NaCl 250 mL (not administered)   heparin Loading Dose bolus dose from infusion pump 5,250 Units (not administered)       Drips infusing?:  Yes    For the majority of the shift this patient was Green.   Interventions performed were NA.    Severe Sepsis OR Septic Shock Diagnosis Present: No      ED NURSE PHONE NUMBER: [phone]

## 2018-07-26 ENCOUNTER — APPOINTMENT (OUTPATIENT)
Dept: CARDIOLOGY | Facility: CLINIC | Age: 58
DRG: 247 | End: 2018-07-26
Attending: PHYSICIAN ASSISTANT
Payer: COMMERCIAL

## 2018-07-26 LAB
ANION GAP SERPL CALCULATED.3IONS-SCNC: 9 MMOL/L (ref 3–14)
BUN SERPL-MCNC: 16 MG/DL (ref 7–30)
CALCIUM SERPL-MCNC: 8.4 MG/DL (ref 8.5–10.1)
CHLORIDE SERPL-SCNC: 107 MMOL/L (ref 94–109)
CO2 SERPL-SCNC: 23 MMOL/L (ref 20–32)
CREAT SERPL-MCNC: 0.81 MG/DL (ref 0.66–1.25)
ERYTHROCYTE [DISTWIDTH] IN BLOOD BY AUTOMATED COUNT: 12.7 % (ref 10–15)
GFR SERPL CREATININE-BSD FRML MDRD: >90 ML/MIN/1.7M2
GLUCOSE SERPL-MCNC: 108 MG/DL (ref 70–99)
HCT VFR BLD AUTO: 37.9 % (ref 40–53)
HGB BLD-MCNC: 12.9 G/DL (ref 13.3–17.7)
KCT BLD-ACNC: 135 SEC (ref 75–150)
LMWH PPP CHRO-ACNC: 0.22 IU/ML
LMWH PPP CHRO-ACNC: 0.3 IU/ML
LMWH PPP CHRO-ACNC: 0.37 IU/ML
MCH RBC QN AUTO: 29.7 PG (ref 26.5–33)
MCHC RBC AUTO-ENTMCNC: 34 G/DL (ref 31.5–36.5)
MCV RBC AUTO: 87 FL (ref 78–100)
PLATELET # BLD AUTO: 191 10E9/L (ref 150–450)
POTASSIUM SERPL-SCNC: 4.1 MMOL/L (ref 3.4–5.3)
RBC # BLD AUTO: 4.34 10E12/L (ref 4.4–5.9)
SODIUM SERPL-SCNC: 139 MMOL/L (ref 133–144)
TROPONIN I SERPL-MCNC: 0.03 UG/L (ref 0–0.04)
WBC # BLD AUTO: 5.7 10E9/L (ref 4–11)

## 2018-07-26 PROCEDURE — 93458 L HRT ARTERY/VENTRICLE ANGIO: CPT

## 2018-07-26 PROCEDURE — 93571 IV DOP VEL&/PRESS C FLO 1ST: CPT | Mod: 52

## 2018-07-26 PROCEDURE — 84484 ASSAY OF TROPONIN QUANT: CPT | Performed by: PHYSICIAN ASSISTANT

## 2018-07-26 PROCEDURE — 80048 BASIC METABOLIC PNL TOTAL CA: CPT | Performed by: PHYSICIAN ASSISTANT

## 2018-07-26 PROCEDURE — 21000000 ZZH R&B IMCU HEART CARE

## 2018-07-26 PROCEDURE — 36415 COLL VENOUS BLD VENIPUNCTURE: CPT | Performed by: PHYSICIAN ASSISTANT

## 2018-07-26 PROCEDURE — 93005 ELECTROCARDIOGRAM TRACING: CPT

## 2018-07-26 PROCEDURE — 99152 MOD SED SAME PHYS/QHP 5/>YRS: CPT | Performed by: INTERNAL MEDICINE

## 2018-07-26 PROCEDURE — 85520 HEPARIN ASSAY: CPT | Performed by: INTERNAL MEDICINE

## 2018-07-26 PROCEDURE — 25000128 H RX IP 250 OP 636: Performed by: INTERNAL MEDICINE

## 2018-07-26 PROCEDURE — 27210795 ZZH PAD DEFIB QUICK CR4

## 2018-07-26 PROCEDURE — C1769 GUIDE WIRE: HCPCS

## 2018-07-26 PROCEDURE — 25000132 ZZH RX MED GY IP 250 OP 250 PS 637

## 2018-07-26 PROCEDURE — 27210856 ZZH ACCESS HEART CATH CR2

## 2018-07-26 PROCEDURE — 27210787 ZZH MANIFOLD CR2

## 2018-07-26 PROCEDURE — 36415 COLL VENOUS BLD VENIPUNCTURE: CPT | Performed by: INTERNAL MEDICINE

## 2018-07-26 PROCEDURE — 27211089 ZZH KIT ACIST INJECTOR CR3

## 2018-07-26 PROCEDURE — 99222 1ST HOSP IP/OBS MODERATE 55: CPT | Performed by: INTERNAL MEDICINE

## 2018-07-26 PROCEDURE — 25000132 ZZH RX MED GY IP 250 OP 250 PS 637: Performed by: INTERNAL MEDICINE

## 2018-07-26 PROCEDURE — 99232 SBSQ HOSP IP/OBS MODERATE 35: CPT | Performed by: PHYSICIAN ASSISTANT

## 2018-07-26 PROCEDURE — 85347 COAGULATION TIME ACTIVATED: CPT

## 2018-07-26 PROCEDURE — 85027 COMPLETE CBC AUTOMATED: CPT | Performed by: PHYSICIAN ASSISTANT

## 2018-07-26 PROCEDURE — 93571 IV DOP VEL&/PRESS C FLO 1ST: CPT | Mod: 26 | Performed by: INTERNAL MEDICINE

## 2018-07-26 PROCEDURE — 99152 MOD SED SAME PHYS/QHP 5/>YRS: CPT

## 2018-07-26 PROCEDURE — 99153 MOD SED SAME PHYS/QHP EA: CPT

## 2018-07-26 PROCEDURE — 93452 LEFT HRT CATH W/VENTRCLGRPHY: CPT

## 2018-07-26 PROCEDURE — 4A023N7 MEASUREMENT OF CARDIAC SAMPLING AND PRESSURE, LEFT HEART, PERCUTANEOUS APPROACH: ICD-10-PCS | Performed by: INTERNAL MEDICINE

## 2018-07-26 PROCEDURE — 4A0335C MEASUREMENT OF ARTERIAL FLOW, CORONARY, PERCUTANEOUS APPROACH: ICD-10-PCS | Performed by: INTERNAL MEDICINE

## 2018-07-26 PROCEDURE — 25000128 H RX IP 250 OP 636: Performed by: PHYSICIAN ASSISTANT

## 2018-07-26 PROCEDURE — 27210946 ZZH KIT HC TOTES DISP CR8

## 2018-07-26 PROCEDURE — 93458 L HRT ARTERY/VENTRICLE ANGIO: CPT | Mod: 26 | Performed by: INTERNAL MEDICINE

## 2018-07-26 PROCEDURE — 93010 ELECTROCARDIOGRAM REPORT: CPT | Performed by: INTERNAL MEDICINE

## 2018-07-26 PROCEDURE — 25000125 ZZHC RX 250: Performed by: INTERNAL MEDICINE

## 2018-07-26 RX ORDER — FUROSEMIDE 10 MG/ML
20-100 INJECTION INTRAMUSCULAR; INTRAVENOUS
Status: DISCONTINUED | OUTPATIENT
Start: 2018-07-26 | End: 2018-07-26 | Stop reason: HOSPADM

## 2018-07-26 RX ORDER — FLUMAZENIL 0.1 MG/ML
0.2 INJECTION, SOLUTION INTRAVENOUS
Status: DISCONTINUED | OUTPATIENT
Start: 2018-07-26 | End: 2018-07-26 | Stop reason: HOSPADM

## 2018-07-26 RX ORDER — ADENOSINE 3 MG/ML
12-12000 INJECTION, SOLUTION INTRAVENOUS
Status: DISCONTINUED | OUTPATIENT
Start: 2018-07-26 | End: 2018-07-26 | Stop reason: HOSPADM

## 2018-07-26 RX ORDER — IOPAMIDOL 755 MG/ML
100 INJECTION, SOLUTION INTRAVASCULAR ONCE
Status: COMPLETED | OUTPATIENT
Start: 2018-07-26 | End: 2018-07-26

## 2018-07-26 RX ORDER — SODIUM NITROPRUSSIDE 25 MG/ML
100-200 INJECTION INTRAVENOUS
Status: DISCONTINUED | OUTPATIENT
Start: 2018-07-26 | End: 2018-07-26 | Stop reason: HOSPADM

## 2018-07-26 RX ORDER — LORAZEPAM 2 MG/ML
0.5 INJECTION INTRAMUSCULAR
Status: DISCONTINUED | OUTPATIENT
Start: 2018-07-26 | End: 2018-07-26 | Stop reason: HOSPADM

## 2018-07-26 RX ORDER — DOPAMINE HYDROCHLORIDE 160 MG/100ML
2-20 INJECTION, SOLUTION INTRAVENOUS CONTINUOUS PRN
Status: DISCONTINUED | OUTPATIENT
Start: 2018-07-26 | End: 2018-07-26 | Stop reason: HOSPADM

## 2018-07-26 RX ORDER — LIDOCAINE 40 MG/G
CREAM TOPICAL
Status: DISCONTINUED | OUTPATIENT
Start: 2018-07-26 | End: 2018-07-26 | Stop reason: HOSPADM

## 2018-07-26 RX ORDER — MORPHINE SULFATE 2 MG/ML
1-2 INJECTION, SOLUTION INTRAMUSCULAR; INTRAVENOUS EVERY 5 MIN PRN
Status: DISCONTINUED | OUTPATIENT
Start: 2018-07-26 | End: 2018-07-26 | Stop reason: HOSPADM

## 2018-07-26 RX ORDER — CLOPIDOGREL 300 MG/1
300-600 TABLET, FILM COATED ORAL
Status: DISCONTINUED | OUTPATIENT
Start: 2018-07-26 | End: 2018-07-26 | Stop reason: HOSPADM

## 2018-07-26 RX ORDER — METOPROLOL TARTRATE 1 MG/ML
5 INJECTION, SOLUTION INTRAVENOUS EVERY 5 MIN PRN
Status: DISCONTINUED | OUTPATIENT
Start: 2018-07-26 | End: 2018-07-26 | Stop reason: HOSPADM

## 2018-07-26 RX ORDER — FENTANYL CITRATE 50 UG/ML
25-50 INJECTION, SOLUTION INTRAMUSCULAR; INTRAVENOUS
Status: ACTIVE | OUTPATIENT
Start: 2018-07-26 | End: 2018-07-27

## 2018-07-26 RX ORDER — ONDANSETRON 2 MG/ML
4 INJECTION INTRAMUSCULAR; INTRAVENOUS EVERY 4 HOURS PRN
Status: DISCONTINUED | OUTPATIENT
Start: 2018-07-26 | End: 2018-07-26 | Stop reason: HOSPADM

## 2018-07-26 RX ORDER — ATROPINE SULFATE 0.1 MG/ML
.5-1 INJECTION INTRAVENOUS
Status: DISCONTINUED | OUTPATIENT
Start: 2018-07-26 | End: 2018-07-26 | Stop reason: HOSPADM

## 2018-07-26 RX ORDER — DIPHENHYDRAMINE HYDROCHLORIDE 50 MG/ML
25-50 INJECTION INTRAMUSCULAR; INTRAVENOUS
Status: DISCONTINUED | OUTPATIENT
Start: 2018-07-26 | End: 2018-07-26 | Stop reason: HOSPADM

## 2018-07-26 RX ORDER — FLUMAZENIL 0.1 MG/ML
0.2 INJECTION, SOLUTION INTRAVENOUS
Status: ACTIVE | OUTPATIENT
Start: 2018-07-26 | End: 2018-07-27

## 2018-07-26 RX ORDER — PRASUGREL 10 MG/1
10-60 TABLET, FILM COATED ORAL
Status: DISCONTINUED | OUTPATIENT
Start: 2018-07-26 | End: 2018-07-26 | Stop reason: HOSPADM

## 2018-07-26 RX ORDER — ASPIRIN 325 MG
325 TABLET ORAL
Status: DISCONTINUED | OUTPATIENT
Start: 2018-07-26 | End: 2018-07-26 | Stop reason: HOSPADM

## 2018-07-26 RX ORDER — HYDROCODONE BITARTRATE AND ACETAMINOPHEN 5; 325 MG/1; MG/1
1-2 TABLET ORAL EVERY 4 HOURS PRN
Status: DISCONTINUED | OUTPATIENT
Start: 2018-07-26 | End: 2018-07-28 | Stop reason: HOSPADM

## 2018-07-26 RX ORDER — PHENYLEPHRINE HCL IN 0.9% NACL 1 MG/10 ML
20-100 SYRINGE (ML) INTRAVENOUS
Status: DISCONTINUED | OUTPATIENT
Start: 2018-07-26 | End: 2018-07-26 | Stop reason: HOSPADM

## 2018-07-26 RX ORDER — NALOXONE HYDROCHLORIDE 0.4 MG/ML
.2-.4 INJECTION, SOLUTION INTRAMUSCULAR; INTRAVENOUS; SUBCUTANEOUS
Status: DISCONTINUED | OUTPATIENT
Start: 2018-07-26 | End: 2018-07-26

## 2018-07-26 RX ORDER — BUPIVACAINE HYDROCHLORIDE 2.5 MG/ML
1-10 INJECTION, SOLUTION EPIDURAL; INFILTRATION; INTRACAUDAL
Status: DISCONTINUED | OUTPATIENT
Start: 2018-07-26 | End: 2018-07-26 | Stop reason: HOSPADM

## 2018-07-26 RX ORDER — POTASSIUM CHLORIDE 7.45 MG/ML
10 INJECTION INTRAVENOUS
Status: DISCONTINUED | OUTPATIENT
Start: 2018-07-26 | End: 2018-07-26 | Stop reason: HOSPADM

## 2018-07-26 RX ORDER — METHYLPREDNISOLONE SODIUM SUCCINATE 125 MG/2ML
125 INJECTION, POWDER, LYOPHILIZED, FOR SOLUTION INTRAMUSCULAR; INTRAVENOUS
Status: DISCONTINUED | OUTPATIENT
Start: 2018-07-26 | End: 2018-07-26 | Stop reason: HOSPADM

## 2018-07-26 RX ORDER — ATROPINE SULFATE 0.1 MG/ML
0.5 INJECTION INTRAVENOUS EVERY 5 MIN PRN
Status: ACTIVE | OUTPATIENT
Start: 2018-07-26 | End: 2018-07-27

## 2018-07-26 RX ORDER — NITROGLYCERIN 5 MG/ML
100-500 VIAL (ML) INTRAVENOUS
Status: DISCONTINUED | OUTPATIENT
Start: 2018-07-26 | End: 2018-07-26 | Stop reason: HOSPADM

## 2018-07-26 RX ORDER — EPINEPHRINE 1 MG/ML
0.3 INJECTION, SOLUTION, CONCENTRATE INTRAVENOUS
Status: DISCONTINUED | OUTPATIENT
Start: 2018-07-26 | End: 2018-07-26 | Stop reason: HOSPADM

## 2018-07-26 RX ORDER — LIDOCAINE 40 MG/G
CREAM TOPICAL
Status: DISCONTINUED | OUTPATIENT
Start: 2018-07-26 | End: 2018-07-26

## 2018-07-26 RX ORDER — DOBUTAMINE HYDROCHLORIDE 200 MG/100ML
2-20 INJECTION INTRAVENOUS CONTINUOUS PRN
Status: DISCONTINUED | OUTPATIENT
Start: 2018-07-26 | End: 2018-07-26 | Stop reason: HOSPADM

## 2018-07-26 RX ORDER — NICARDIPINE HYDROCHLORIDE 2.5 MG/ML
100 INJECTION INTRAVENOUS
Status: DISCONTINUED | OUTPATIENT
Start: 2018-07-26 | End: 2018-07-26 | Stop reason: HOSPADM

## 2018-07-26 RX ORDER — FENTANYL CITRATE 50 UG/ML
25-50 INJECTION, SOLUTION INTRAMUSCULAR; INTRAVENOUS
Status: DISCONTINUED | OUTPATIENT
Start: 2018-07-26 | End: 2018-07-26 | Stop reason: HOSPADM

## 2018-07-26 RX ORDER — POTASSIUM CHLORIDE 29.8 MG/ML
20 INJECTION INTRAVENOUS
Status: DISCONTINUED | OUTPATIENT
Start: 2018-07-26 | End: 2018-07-26 | Stop reason: HOSPADM

## 2018-07-26 RX ORDER — POTASSIUM CHLORIDE 1500 MG/1
20 TABLET, EXTENDED RELEASE ORAL
Status: DISCONTINUED | OUTPATIENT
Start: 2018-07-26 | End: 2018-07-26 | Stop reason: HOSPADM

## 2018-07-26 RX ORDER — ACETAMINOPHEN 325 MG/1
325-650 TABLET ORAL EVERY 4 HOURS PRN
Status: DISCONTINUED | OUTPATIENT
Start: 2018-07-26 | End: 2018-07-28 | Stop reason: HOSPADM

## 2018-07-26 RX ORDER — LORAZEPAM 2 MG/ML
.5-2 INJECTION INTRAMUSCULAR EVERY 4 HOURS PRN
Status: DISCONTINUED | OUTPATIENT
Start: 2018-07-26 | End: 2018-07-26 | Stop reason: HOSPADM

## 2018-07-26 RX ORDER — LIDOCAINE HYDROCHLORIDE 10 MG/ML
1-10 INJECTION, SOLUTION EPIDURAL; INFILTRATION; INTRACAUDAL; PERINEURAL
Status: COMPLETED | OUTPATIENT
Start: 2018-07-26 | End: 2018-07-26

## 2018-07-26 RX ORDER — ASPIRIN 81 MG/1
81-324 TABLET, CHEWABLE ORAL
Status: DISCONTINUED | OUTPATIENT
Start: 2018-07-26 | End: 2018-07-26 | Stop reason: HOSPADM

## 2018-07-26 RX ORDER — NALOXONE HYDROCHLORIDE 0.4 MG/ML
0.4 INJECTION, SOLUTION INTRAMUSCULAR; INTRAVENOUS; SUBCUTANEOUS EVERY 5 MIN PRN
Status: DISCONTINUED | OUTPATIENT
Start: 2018-07-26 | End: 2018-07-26 | Stop reason: HOSPADM

## 2018-07-26 RX ORDER — ASPIRIN 81 MG/1
TABLET, CHEWABLE ORAL
Status: COMPLETED
Start: 2018-07-26 | End: 2018-07-26

## 2018-07-26 RX ORDER — ASPIRIN 81 MG/1
81 TABLET ORAL DAILY
Status: DISCONTINUED | OUTPATIENT
Start: 2018-07-27 | End: 2018-07-28 | Stop reason: HOSPADM

## 2018-07-26 RX ORDER — VERAPAMIL HYDROCHLORIDE 2.5 MG/ML
1-2.5 INJECTION, SOLUTION INTRAVENOUS
Status: DISCONTINUED | OUTPATIENT
Start: 2018-07-26 | End: 2018-07-26 | Stop reason: HOSPADM

## 2018-07-26 RX ORDER — NITROGLYCERIN 20 MG/100ML
.07-1.66 INJECTION INTRAVENOUS CONTINUOUS PRN
Status: DISCONTINUED | OUTPATIENT
Start: 2018-07-26 | End: 2018-07-26 | Stop reason: HOSPADM

## 2018-07-26 RX ORDER — ISOSORBIDE MONONITRATE 30 MG/1
30 TABLET, EXTENDED RELEASE ORAL DAILY
Status: DISCONTINUED | OUTPATIENT
Start: 2018-07-26 | End: 2018-07-28 | Stop reason: HOSPADM

## 2018-07-26 RX ORDER — LIDOCAINE HYDROCHLORIDE 10 MG/ML
30 INJECTION, SOLUTION EPIDURAL; INFILTRATION; INTRACAUDAL; PERINEURAL
Status: DISCONTINUED | OUTPATIENT
Start: 2018-07-26 | End: 2018-07-26 | Stop reason: HOSPADM

## 2018-07-26 RX ORDER — HEPARIN SODIUM 1000 [USP'U]/ML
1000-10000 INJECTION, SOLUTION INTRAVENOUS; SUBCUTANEOUS EVERY 5 MIN PRN
Status: DISCONTINUED | OUTPATIENT
Start: 2018-07-26 | End: 2018-07-26 | Stop reason: HOSPADM

## 2018-07-26 RX ORDER — DEXTROSE MONOHYDRATE 25 G/50ML
12.5-5 INJECTION, SOLUTION INTRAVENOUS EVERY 30 MIN PRN
Status: DISCONTINUED | OUTPATIENT
Start: 2018-07-26 | End: 2018-07-26 | Stop reason: HOSPADM

## 2018-07-26 RX ORDER — LORAZEPAM 0.5 MG/1
0.5 TABLET ORAL
Status: DISCONTINUED | OUTPATIENT
Start: 2018-07-26 | End: 2018-07-26 | Stop reason: HOSPADM

## 2018-07-26 RX ORDER — PROTAMINE SULFATE 10 MG/ML
1-5 INJECTION, SOLUTION INTRAVENOUS
Status: DISCONTINUED | OUTPATIENT
Start: 2018-07-26 | End: 2018-07-26 | Stop reason: HOSPADM

## 2018-07-26 RX ORDER — ENALAPRILAT 1.25 MG/ML
1.25-2.5 INJECTION INTRAVENOUS
Status: DISCONTINUED | OUTPATIENT
Start: 2018-07-26 | End: 2018-07-26 | Stop reason: HOSPADM

## 2018-07-26 RX ORDER — NALOXONE HYDROCHLORIDE 0.4 MG/ML
.1-.4 INJECTION, SOLUTION INTRAMUSCULAR; INTRAVENOUS; SUBCUTANEOUS
Status: DISCONTINUED | OUTPATIENT
Start: 2018-07-26 | End: 2018-07-26

## 2018-07-26 RX ORDER — NITROGLYCERIN 5 MG/ML
100-200 VIAL (ML) INTRAVENOUS
Status: DISCONTINUED | OUTPATIENT
Start: 2018-07-26 | End: 2018-07-26 | Stop reason: HOSPADM

## 2018-07-26 RX ORDER — NIFEDIPINE 10 MG/1
10 CAPSULE ORAL
Status: DISCONTINUED | OUTPATIENT
Start: 2018-07-26 | End: 2018-07-26 | Stop reason: HOSPADM

## 2018-07-26 RX ORDER — NITROGLYCERIN 0.4 MG/1
0.4 TABLET SUBLINGUAL EVERY 5 MIN PRN
Status: DISCONTINUED | OUTPATIENT
Start: 2018-07-26 | End: 2018-07-26 | Stop reason: HOSPADM

## 2018-07-26 RX ORDER — SODIUM CHLORIDE 9 MG/ML
INJECTION, SOLUTION INTRAVENOUS CONTINUOUS
Status: DISCONTINUED | OUTPATIENT
Start: 2018-07-26 | End: 2018-07-26 | Stop reason: HOSPADM

## 2018-07-26 RX ORDER — PROTAMINE SULFATE 10 MG/ML
25-100 INJECTION, SOLUTION INTRAVENOUS EVERY 5 MIN PRN
Status: DISCONTINUED | OUTPATIENT
Start: 2018-07-26 | End: 2018-07-26 | Stop reason: HOSPADM

## 2018-07-26 RX ORDER — CLOPIDOGREL BISULFATE 75 MG/1
75 TABLET ORAL
Status: DISCONTINUED | OUTPATIENT
Start: 2018-07-26 | End: 2018-07-26 | Stop reason: HOSPADM

## 2018-07-26 RX ORDER — SODIUM CHLORIDE 9 MG/ML
INJECTION, SOLUTION INTRAVENOUS CONTINUOUS
Status: DISCONTINUED | OUTPATIENT
Start: 2018-07-26 | End: 2018-07-27

## 2018-07-26 RX ORDER — HYDRALAZINE HYDROCHLORIDE 20 MG/ML
10-20 INJECTION INTRAMUSCULAR; INTRAVENOUS
Status: DISCONTINUED | OUTPATIENT
Start: 2018-07-26 | End: 2018-07-26 | Stop reason: HOSPADM

## 2018-07-26 RX ADMIN — MIDAZOLAM 1 MG: 1 INJECTION INTRAMUSCULAR; INTRAVENOUS at 13:33

## 2018-07-26 RX ADMIN — FENTANYL CITRATE 50 MCG: 50 INJECTION, SOLUTION INTRAMUSCULAR; INTRAVENOUS at 13:50

## 2018-07-26 RX ADMIN — SODIUM CHLORIDE, PRESERVATIVE FREE 75 ML: 5 INJECTION INTRAVENOUS at 10:35

## 2018-07-26 RX ADMIN — ISOSORBIDE MONONITRATE 30 MG: 30 TABLET, EXTENDED RELEASE ORAL at 17:36

## 2018-07-26 RX ADMIN — FENTANYL CITRATE 50 MCG: 50 INJECTION, SOLUTION INTRAMUSCULAR; INTRAVENOUS at 13:30

## 2018-07-26 RX ADMIN — ASPIRIN 81 MG 243 MG: 81 TABLET ORAL at 10:58

## 2018-07-26 RX ADMIN — VENLAFAXINE 37.5 MG: 37.5 TABLET ORAL at 08:42

## 2018-07-26 RX ADMIN — OMEPRAZOLE 20 MG: 20 CAPSULE, DELAYED RELEASE ORAL at 21:01

## 2018-07-26 RX ADMIN — TICAGRELOR 90 MG: 90 TABLET ORAL at 08:42

## 2018-07-26 RX ADMIN — ACETAMINOPHEN 650 MG: 325 TABLET, FILM COATED ORAL at 08:40

## 2018-07-26 RX ADMIN — METOPROLOL TARTRATE 25 MG: 25 TABLET ORAL at 08:42

## 2018-07-26 RX ADMIN — MIDAZOLAM 1 MG: 1 INJECTION INTRAMUSCULAR; INTRAVENOUS at 13:25

## 2018-07-26 RX ADMIN — OMEPRAZOLE 20 MG: 20 CAPSULE, DELAYED RELEASE ORAL at 08:41

## 2018-07-26 RX ADMIN — METOPROLOL TARTRATE 25 MG: 25 TABLET ORAL at 21:01

## 2018-07-26 RX ADMIN — FENTANYL CITRATE 50 MCG: 50 INJECTION, SOLUTION INTRAMUSCULAR; INTRAVENOUS at 13:40

## 2018-07-26 RX ADMIN — LISINOPRIL 40 MG: 40 TABLET ORAL at 21:29

## 2018-07-26 RX ADMIN — ACETAMINOPHEN 650 MG: 325 TABLET, FILM COATED ORAL at 18:28

## 2018-07-26 RX ADMIN — TICAGRELOR 90 MG: 90 TABLET ORAL at 21:08

## 2018-07-26 RX ADMIN — IOPAMIDOL 100 ML: 755 INJECTION, SOLUTION INTRAVASCULAR at 14:00

## 2018-07-26 RX ADMIN — SODIUM CHLORIDE: 9 INJECTION, SOLUTION INTRAVENOUS at 13:06

## 2018-07-26 RX ADMIN — ASPIRIN 81 MG 81 MG: 81 TABLET ORAL at 08:42

## 2018-07-26 RX ADMIN — ATORVASTATIN CALCIUM 40 MG: 40 TABLET, FILM COATED ORAL at 08:42

## 2018-07-26 RX ADMIN — HEPARIN SODIUM 1050 UNITS/HR: 10000 INJECTION, SOLUTION INTRAVENOUS at 10:36

## 2018-07-26 RX ADMIN — SODIUM CHLORIDE: 9 INJECTION, SOLUTION INTRAVENOUS at 15:00

## 2018-07-26 RX ADMIN — LIDOCAINE HYDROCHLORIDE 10 ML: 10 INJECTION, SOLUTION EPIDURAL; INFILTRATION; INTRACAUDAL; PERINEURAL at 13:34

## 2018-07-26 RX ADMIN — MIDAZOLAM 1 MG: 1 INJECTION INTRAMUSCULAR; INTRAVENOUS at 13:50

## 2018-07-26 RX ADMIN — FENTANYL CITRATE 50 MCG: 50 INJECTION, SOLUTION INTRAMUSCULAR; INTRAVENOUS at 13:25

## 2018-07-26 RX ADMIN — ACETAMINOPHEN 650 MG: 325 TABLET, FILM COATED ORAL at 02:26

## 2018-07-26 RX ADMIN — VENLAFAXINE 37.5 MG: 37.5 TABLET ORAL at 21:01

## 2018-07-26 RX ADMIN — MIDAZOLAM 1 MG: 1 INJECTION INTRAMUSCULAR; INTRAVENOUS at 13:30

## 2018-07-26 RX ADMIN — NITROGLYCERIN 0.07 MCG/KG/MIN: 20 INJECTION INTRAVENOUS at 02:12

## 2018-07-26 ASSESSMENT — ACTIVITIES OF DAILY LIVING (ADL)
TOILETING: 0-->INDEPENDENT
AMBULATION: 0-->INDEPENDENT
SWALLOWING: 0-->SWALLOWS FOODS/LIQUIDS WITHOUT DIFFICULTY
ADLS_ACUITY_SCORE: 11
DRESS: 0-->INDEPENDENT
BATHING: 0-->INDEPENDENT
RETIRED_EATING: 0-->INDEPENDENT
TRANSFERRING: 0-->INDEPENDENT
COGNITION: 0 - NO COGNITION ISSUES REPORTED
FALL_HISTORY_WITHIN_LAST_SIX_MONTHS: NO
RETIRED_COMMUNICATION: 0-->UNDERSTANDS/COMMUNICATES WITHOUT DIFFICULTY

## 2018-07-26 ASSESSMENT — PAIN DESCRIPTION - DESCRIPTORS
DESCRIPTORS: HEADACHE
DESCRIPTORS: HEADACHE

## 2018-07-26 NOTE — PLAN OF CARE
Problem: Patient Care Overview  Goal: Plan of Care/Patient Progress Review  Outcome: No Change  Patient is alert and oriented times 4.  cms is intact. Right groin soft, no hematoma or bruit. On bedrest until at least 1630.  No chest  pain. Patient is up indep when off bedrest.  pt voiding per BR. Empties own ileostomy.  NSR. VSS on RA. Cardiology to see about possible DC tonight. Pt educated on cath procedure, post angio restrictions.

## 2018-07-26 NOTE — PROCEDURES
Heart cath prelim  lma-nl  Lad prox 60% IFR flow 0.96 (normal)  Lcx ok  RCA stent ok prox, mild mid  LVG normal  REC empiric imdur 30/d ( I ordered) nucl gxt--if + could stent Lad but cannot rec stent now based on above data-continue DAPT for one more month if you wish

## 2018-07-26 NOTE — PROGRESS NOTES
RECEIVING UNIT ED HANDOFF REVIEW    ED Nurse Handoff Report was reviewed by: Soy Rankin on July 25, 2018 at 7:02 PM

## 2018-07-26 NOTE — PLAN OF CARE
Problem: Patient Care Overview  Goal: Plan of Care/Patient Progress Review  Outcome: No Change  Came to unit around 2000.  A&O x 4.  VSS, RA. CMS intact.  Denies SOB/chest pain.  Headache managed with tylenol.  Up independent.  Heparin infusing @ 1050 unit/hr.  Recheck @ 0400.  Ileostomy patent.  Transferring to CCU for nitroglycerin drip.  Report given to NAZIA Duarte.

## 2018-07-26 NOTE — PROGRESS NOTES
[facility]    Hospitalist Progress Note    Date of Service (when I saw the patient): 07/26/2018    Assessment & Plan   Mr. Darryl Uribe is a 58-year-old male with history of coronary artery disease, status post PCI in 2017, who presents to the emergency room with escalating chest pain.     Unstable angina in the context of stenting of the right coronary artery in June 2017 with ongoing dual antiplatelet therapy. The patient presents with worsening exercise tolerance associated with chest pain related to exertion that has been progressively getting worse.  He could not undergo a stress test due to ongoing symptoms.  He is admitted with concerns for unstable angina.    - Continue heparin drip    - Serial troponin 0.026-->0.024-->0.028  - Cardiology consulted,appreciated.    - Continue aspirin and Brilinta.    - Continue his Lipitor, lisinopril and metoprolol.   - Proceed with coronary angiogram and possible PCI today.    Benign essential hypertension.  Blood pressure is slightly on the higher side. PTA regimen includes lisinopril 40 mg daily, metoprolol 25 mg twice a day.    - Continue lisinopril and metoprolol.  - Adjust as needed.     Mild thrombocytopenia. Platelets on admission 106, he has had normal platelets in the past.  - Recheck normal at 191.     History of ulcerative colitis.  The patient is status post proctocolectomy and has an end ileostomy.  No active issues at the moment, he is not on any specific treatment for UC.      # Pain Assessment:  Current Pain Score 7/26/2018   Patient currently in pain? -   Pain score (0-10) 2   Pain location -   Pain descriptors -   - Darryl is experiencing pain due to headache. Pain management was discussed and the plan was created in a collaborative fashion.  Darryl's response to the current recommendations: engaged  compliant  - Pharmacologic adjuvants: Acetaminophen    DVT Prophylaxis: IV heparin  Code Status: Full Code    Disposition: Expected discharge  pending angiogram results and cardiology input.    Benedict Lewis    Interval History   Pt c/o tension headache.  Denies chest pain, sob, abd pain, n/v.  Feels warm, but no diaphoresis or fever.  Planned for angiogram today.    -Data reviewed today: I reviewed all new labs and imaging results over the last 24 hours.    Physical Exam   Temp: 98.1  F (36.7  C) Temp src: Oral BP: (!) 132/94 Pulse: 77 Heart Rate: 77 Resp: 18 SpO2: 96 % O2 Device: None (Room air)    Vitals:    07/25/18 1558 07/25/18 1948 07/26/18 0210   Weight: 118.8 kg (262 lb) 118.4 kg (261 lb) 120.4 kg (265 lb 6.4 oz)     Vital Signs with Ranges  Temp:  [98.1  F (36.7  C)-98.5  F (36.9  C)] 98.1  F (36.7  C)  Pulse:  [77] 77  Heart Rate:  [55-77] 77  Resp:  [8-20] 18  BP: ()/(67-94) 132/94  SpO2:  [95 %-98 %] 96 %  I/O last 3 completed shifts:  In: -   Out: 300 [Stool:300]    Constitutional: Alert, oriented to person, place, date, situation.  Cooperative, lying in bed in NAD.  Respiratory:  Lungs CTAB.  No crackles, wheezes, or rhonchi, no labored breathing.  Cardiovascular:  Heart RRR, no MRG, no edema.  GI:  Abdomen soft, NT/ND and with normoactive BS  Skin/Integumen:  Warm, dry, non-diaphoretic.  MSK: CMS x4 intact.    Medications     Continuing ACE inhibitor/ARB/ARNI from home medication list OR ACE inhibitor/ARB order already placed during this visit       - MEDICATION INSTRUCTIONS -       - MEDICATION INSTRUCTIONS -       HEParin 1,050 Units/hr (07/26/18 0229)     nitroGLYcerin 0.17 mcg/kg/min (07/26/18 0659)     - MEDICATION INSTRUCTIONS -       sodium chloride 75 mL/hr at 07/25/18 2127       aspirin chewable tablet 81 mg  81 mg Oral QAM     atorvastatin  40 mg Oral Daily     lisinopril  40 mg Oral At Bedtime     metoprolol tartrate  25 mg Oral BID     omeprazole  20 mg Oral BID     sodium chloride (PF)  3 mL Intracatheter Q8H     ticagrelor (BRILINTA) tablet 90 mg  90 mg Oral BID     venlafaxine  37.5 mg Oral BID       Data      Recent Labs  Lab 07/25/18  2315 07/25/18  1736 07/25/18  1706 07/25/18  1231   WBC  --   --  4.9  --    HGB  --   --  13.1* 14.2   MCV  --   --  88  --    PLT  --   --  106*  --    INR  --  1.09 Canceled, Test credited  --    NA  --   --  139  --    POTASSIUM  --   --  4.5  --    CHLORIDE  --   --  106  --    CO2  --   --  25  --    BUN  --   --  18  --    CR  --   --  0.96  --    ANIONGAP  --   --  8  --    DYANA  --   --  9.0  --    GLC  --   --  93  --    TROPI 0.024  --  0.026  --        Recent Results (from the past 24 hour(s))   XR Chest 2 Views    Narrative    CHEST TWO VIEWS    7/25/2018 6:12 PM     HISTORY: Chest pain.     COMPARISON: 6/14/2017 chest x-ray.      Impression    IMPRESSION: Heart size is normal. Pulmonary vasculature is normal.  Lungs are clear. No pleural fluid. No acute disease. No significant  change.    GIANNI CISNEROS MD

## 2018-07-26 NOTE — PLAN OF CARE
Problem: Cardiac: ACS (Acute Coronary Syndrome) (Adult)  Goal: Signs and Symptoms of Listed Potential Problems Will be Absent, Minimized or Managed (Cardiac: ACS)  Signs and symptoms of listed potential problems will be absent, minimized or managed by discharge/transition of care (reference Cardiac: ACS (Acute Coronary Syndrome) (Adult) CPG).  Outcome: Improving  Pt received from [facility] alert and oriented, denies chest pain but c/o headache, tylenol given. Heparin and nitroglycerin gtt infusing. Ileostomy with self care. Ambulates to the bathroom independently. Safety maintained. VS stable, SR/SB, monitor.

## 2018-07-26 NOTE — CONSULTS
[facility]  Cardiology Consultation     Date of Admission:  7/25/2018  Date of Consult (When I saw the patient): 07/26/18  Primary Cardiologist: Dr. Amaya    Assessment & Plan   Darryl Uribe is a 58 year old male who was admitted on 7/25/2018. I was asked to see the patient for chest discomfort. He was sent from nuclear stress testing due to progressive symptoms.    Unstable Angina   -- Troponin    -- ECG showed NSR with non-specific T wave changes     -- Kepp NPO  -- Continue with ASA and nitroglycerin drip  -- Trend Troponin   -- Plan for coronary angiogram today. Reviewed risk and benefits including MI, death, and CVA. He verbalized understanding and was in agreement with proceeding with coronary angiogram.   -- Perform echocardiogram baseline evaluation    CAD   -- Hx of RCA stenting in 2017   -- Pta regimen included Brilinta and ASA    HTN   --  Well controlled with acei 40 mg and BB 25 mg BID    HLD  -- Atorvastatin 40 mg   -- LDL 65    Ulcerative Colitis  -- s/p protocolectomy with end ileostomy       Code Status    Full Code    Primary Care Physician   Oseas Correa    Chief Complaint   Chest Discomfort     History is obtained from the patient.    History of Present Illness   Darryl Uribe is a 58 year old male who is admitted directly from nuclear stress testing for unstable angina. His past medical history is notable for CAD (RCA stenting in 6/2017), HTN, HLD, obesity, and UC. He was recently seen in cardiology clinic and was sent for nuclear stress testing for progressive CP and SOB. He however could not complete the study due to worsening symptoms and was admitted to the hospital after discussion with Dr. Llanos.     He denies orthopnea, PND, peripheral edema, or significant weight gain. No bleeding issues. He has had no symptoms since his admission to the hospital. He is NPO. No history of contrast dye allergy.      Past Medical History   Past Medical History:   Diagnosis Date      Anxiety      CAD (coronary artery disease)     NonSTEMI 06/2017. Stent to RCA     Hyperlipemia      Hypertension      Ulcerative colitis (H)        Past Surgical History   History reviewed. No pertinent surgical history.    Prior to Admission Medications   Prior to Admission Medications   Prescriptions Last Dose Informant Patient Reported? Taking?   ASPIRIN PO 7/25/2018 at AM Self Yes Yes   Sig: Take 81 mg by mouth every morning    Ticagrelor (BRILINTA PO) 7/25/2018 at AM Self Yes Yes   Sig: Take 90 mg by mouth 2 times daily Filled on 7/10/18 for 30 days supply (60 tabs), no refills.   VITAMIN D, CHOLECALCIFEROL, PO 7/23/2018 Self Yes Yes   Sig: Take 400 Units by mouth daily   atorvastatin (LIPITOR) 40 MG tablet 7/25/2018 at AM Self No Yes   Sig: Take 1 tablet (40 mg) by mouth daily   lisinopril (PRINIVIL/ZESTRIL) 40 MG tablet 7/24/2018 at HS Self No Yes   Sig: Take 1 tablet (40 mg) by mouth At Bedtime   metoprolol (LOPRESSOR) 25 MG tablet 7/23/2018 at PM Self No Yes   Sig: Take 1 tablet (25 mg) by mouth 2 times daily   nitroGLYcerin (NITROSTAT) 0.4 MG sublingual tablet More than a year Self No Yes   Sig: For chest pain place 1 tablet under the tongue every 5 minutes for 3 doses. If symptoms persist 5 minutes after 1st dose call 911.   omeprazole (PRILOSEC) 20 MG CR capsule 7/25/2018 at AM Self Yes Yes   Sig: Take 20 mg by mouth 2 times daily   venlafaxine (EFFEXOR) 37.5 MG tablet 7/25/2018 at AM Self Yes Yes   Sig: Take 37.5 mg by mouth 2 times daily      Facility-Administered Medications: None     Allergies   No Known Allergies    Social History     reports that he has never smoked. He has never used smokeless tobacco. He reports that he does not drink alcohol or use illicit drugs.    Family History   Family History   Problem Relation Age of Onset     Myocardial Infarction Mother        Review of Systems   The 10 point Review of Systems is negative other than noted in the HPI or here.     Physical Exam   Temp:  98.1  F (36.7  C) Temp src: Oral BP: (!) 132/94 Pulse: 77 Heart Rate: 77 Resp: 18 SpO2: 96 % O2 Device: None (Room air)      Gen- NAD, oxygen per room air, on nitroglycerin and heparin drip  Neck- unable to assess for JVD  Resp- CTA, alisa  Card- distant but RRR w/o m,r,g  Abd- soft, nontender  Ext- no pitting edema  Vasc- 2+ peripheral pulses bilaterally     Data   Labs, previous angiogram, vitals, CXR, and ECG reviewed by me.       Natalie Ballesteros PA-C 7/26/2018

## 2018-07-26 NOTE — H&P
Admitted:     07/25/2018      PRIMARY CARE PROVIDER:  Oseas Correa MD      PRIMARY CARDIOLOGIST:  Mike Tirado MD, [facility], with [facility].      CHIEF COMPLAINT:  Chest pain.      HISTORY OF PRESENT ILLNESS:  Mr. Darryl Uribe is a 58-year-old male with known history of coronary artery disease, status post PCI of the right coronary artery in 06/2017, who presents to the emergency room from outpatient nuclear stress testing where he had recurring symptoms and could not complete the stress test.  On questioning further, the patient mentions that he has been having ongoing symptoms for the last 2 months or so.  He noticed at the beginning of summer that he was getting short of breath with minimal activities and any time he exerted himself, even slightly, he would get tightness in his chest, which would be relieved with rest.  Over the course of the past 2 months, his exertional chest pain has been more prominent and he has been getting chest tightness with even very minimal activity.  He denies any pain at rest.  He denies any dyspnea.  He denies orthopnea or PND.  No cough or fever.  No abdominal pain.  No vomiting or diarrhea.  The patient saw ZACHERY Chen CNP, with [facility] on 07/23/2018 and was scheduled for outpatient nuclear stress test based on the symptoms that have been ongoing.  The patient presented today for nuclear stress test; however, he complained of pressure in the chest, even prior to starting the treadmill portion.  This was discussed with Dr. Llnaos who, based on the symptoms, recommended that he come to the emergency room for potential coronary angiogram.      In the emergency room, the patient had detectable, but troponin within the normal range at 0.026.  A 12-lead electrocardiogram showed normal sinus rhythm with nonspecific T-wave changes in the inferior leads.  Given the escalating symptoms, concern was for unstable angina.  He was started on heparin drip.      PAST MEDICAL HISTORY:   1.   Coronary artery disease, status post PCI in 06/2017.   2.  Benign essential hypertension.   3.  Generalized anxiety disorder.   4.  Hyperlipidemia.   5.  Ulcerative colitis, status post total proctocolectomy with end ileostomy.      ALLERGIES:  NONE.      SOCIAL AND PERSONAL HISTORY:  Never smoked, uses alcohol socially, no recreational drug use.      FAMILY HISTORY:  Positive for father with lung cancer, heart attack in both the parents in their 70s.      HOME MEDICATIONS:    Prior to Admission Medications   Prescriptions Last Dose Informant Patient Reported? Taking?   ASPIRIN PO 7/25/2018 at AM Self Yes Yes   Sig: Take 81 mg by mouth every morning    VITAMIN D, CHOLECALCIFEROL, PO 7/23/2018 Self Yes Yes   Sig: Take 400 Units by mouth daily   atorvastatin (LIPITOR) 40 MG tablet 7/25/2018 at AM Self No Yes   Sig: Take 1 tablet (40 mg) by mouth daily   lisinopril (PRINIVIL/ZESTRIL) 40 MG tablet 7/24/2018 at HS Self No Yes   Sig: Take 1 tablet (40 mg) by mouth At Bedtime   metoprolol (LOPRESSOR) 25 MG tablet 7/23/2018 at PM Self No Yes   Sig: Take 1 tablet (25 mg) by mouth 2 times daily   nitroGLYcerin (NITROSTAT) 0.4 MG sublingual tablet More than a year Self No Yes   Sig: For chest pain place 1 tablet under the tongue every 5 minutes for 3 doses. If symptoms persist 5 minutes after 1st dose call 911.   omeprazole (PRILOSEC) 20 MG CR capsule 7/25/2018 at AM Self Yes Yes   Sig: Take 20 mg by mouth 2 times daily   venlafaxine (EFFEXOR) 37.5 MG tablet 7/25/2018 at AM Self Yes Yes   Sig: Take 37.5 mg by mouth 2 times daily      Facility-Administered Medications: None          REVIEW OF SYSTEMS:  A complete review of system was done and was negative for anything else other than that mentioned in the HPI.      PHYSICAL EXAMINATION:     GENERAL:  Mr. Darryl Uribe is a 58-year-old male.  He is not in any overt distress.   VITAL SIGNS:  Temperature 98.5, blood pressure 155/89, heart rate 77, respiration rate 18, O2 sat 97% on  room air.   HEENT:  Atraumatic, normocephalic.   NECK:  Supple, with good range of motion, no elevated JVD.   RESPIRATORY:  Good air entry bilaterally with normal effort of breathing.  No crackles or wheeze.   CARDIOVASCULAR:  Regular rate and rhythm, no murmur.   GASTROINTESTINAL:  Abdomen soft, nontender.  Ileostomy in place.  Bowel sounds appear to be normoactive.   EXTREMITIES:  No edema, cyanosis or clubbing.   SKIN:  Warm and dry.   NEUROLOGIC:  Awake, alert, oriented.  Cranial nerves II-XII intact.      LABORATORY DATA:  Troponin 0.026.  Hemoglobin 13.1, platelet is slightly low at 106.  X-ray of the chest is without any acute findings.  A 12-lead EKG is as described above.      ASSESSMENT AND PLAN:  Mr. Darryl Uribe is a 58-year-old male with history of coronary artery disease, status post PCI in 2015, who presents to the emergency room with escalating chest pain.   1.  Unstable angina.  The patient presents with worsening exercise tolerance associated with chest pain related to exertion that has been progressively getting worse.  He could not undergo a stress test today due to ongoing symptoms.  He will be admitted with concerns for unstable angina.  Continue heparin drip started in the emergency room.  Get serial troponins.  Cardiology will be consulted in the morning for possible angiogram.  The patient is on a regimen of aspirin and Brilinta, which will be continued.  We will also continue his Lipitor, lisinopril and metoprolol once verified by the pharmacy.   2.  Benign essential hypertension.  Prior to admission regimen includes lisinopril 40 mg daily, metoprolol 25 mg twice a day.  We will continue both of those once verified by the pharmacy.  Blood pressure is slightly on the higher side.  We will keep an eye on that.   3.  Mild thrombocytopenia.  His platelet today is 106, he has had normal platelets in the past, etiology unclear at this time.  We will recheck in the morning.   4.  History of  ulcerative colitis.  The patient is status post proctocolectomy and has an end ileostomy.  No active issues at the moment, he is not on any specific treatment for UC.      Anticipated a length of stay more than 2 nights.      CODE STATUS:  FULL CODE.         EVANGELIST RUIZ MD             D: 2018   T: 2018   MT: TEAGAN      Name:     ROHITH DALY   MRN:      [id number]        Account:      [id number]   :      1960        Admitted:     2018                   Document: [id number]       cc: Oseas Correa MD

## 2018-07-26 NOTE — PLAN OF CARE
Problem: Patient Care Overview  Goal: Plan of Care/Patient Progress Review  PT/CR: Patient noted to be scheduled to undergo an angio today; Patient will be rescheduled for CR tomorrow.

## 2018-07-27 ENCOUNTER — APPOINTMENT (OUTPATIENT)
Dept: CARDIOLOGY | Facility: CLINIC | Age: 58
DRG: 247 | End: 2018-07-27
Attending: INTERNAL MEDICINE
Payer: COMMERCIAL

## 2018-07-27 ENCOUNTER — APPOINTMENT (OUTPATIENT)
Dept: NUCLEAR MEDICINE | Facility: CLINIC | Age: 58
DRG: 247 | End: 2018-07-27
Attending: INTERNAL MEDICINE
Payer: COMMERCIAL

## 2018-07-27 LAB
ANION GAP SERPL CALCULATED.3IONS-SCNC: 8 MMOL/L (ref 3–14)
BUN SERPL-MCNC: 14 MG/DL (ref 7–30)
CALCIUM SERPL-MCNC: 8.8 MG/DL (ref 8.5–10.1)
CHLORIDE SERPL-SCNC: 106 MMOL/L (ref 94–109)
CHOLEST SERPL-MCNC: 128 MG/DL
CO2 SERPL-SCNC: 25 MMOL/L (ref 20–32)
CREAT SERPL-MCNC: 0.87 MG/DL (ref 0.66–1.25)
ERYTHROCYTE [DISTWIDTH] IN BLOOD BY AUTOMATED COUNT: 12.7 % (ref 10–15)
GFR SERPL CREATININE-BSD FRML MDRD: 90 ML/MIN/1.7M2
GLUCOSE SERPL-MCNC: 92 MG/DL (ref 70–99)
HCT VFR BLD AUTO: 35.1 % (ref 40–53)
HDLC SERPL-MCNC: 40 MG/DL
HGB BLD-MCNC: 12 G/DL (ref 13.3–17.7)
KCT BLD-ACNC: 245 SEC (ref 75–150)
LDLC SERPL CALC-MCNC: 43 MG/DL
LMWH PPP CHRO-ACNC: <0.1 IU/ML
MCH RBC QN AUTO: 29.8 PG (ref 26.5–33)
MCHC RBC AUTO-ENTMCNC: 34.2 G/DL (ref 31.5–36.5)
MCV RBC AUTO: 87 FL (ref 78–100)
NONHDLC SERPL-MCNC: 88 MG/DL
PLATELET # BLD AUTO: 184 10E9/L (ref 150–450)
POTASSIUM SERPL-SCNC: 3.9 MMOL/L (ref 3.4–5.3)
RBC # BLD AUTO: 4.03 10E12/L (ref 4.4–5.9)
SODIUM SERPL-SCNC: 139 MMOL/L (ref 133–144)
TRIGL SERPL-MCNC: 223 MG/DL
WBC # BLD AUTO: 8.4 10E9/L (ref 4–11)

## 2018-07-27 PROCEDURE — 27211089 ZZH KIT ACIST INJECTOR CR3

## 2018-07-27 PROCEDURE — 80048 BASIC METABOLIC PNL TOTAL CA: CPT | Performed by: PHYSICIAN ASSISTANT

## 2018-07-27 PROCEDURE — 27210946 ZZH KIT HC TOTES DISP CR8

## 2018-07-27 PROCEDURE — C1874 STENT, COATED/COV W/DEL SYS: HCPCS

## 2018-07-27 PROCEDURE — 80061 LIPID PANEL: CPT | Performed by: PHYSICIAN ASSISTANT

## 2018-07-27 PROCEDURE — 27210995 ZZH RX 272: Performed by: INTERNAL MEDICINE

## 2018-07-27 PROCEDURE — 27210856 ZZH ACCESS HEART CATH CR2

## 2018-07-27 PROCEDURE — 93005 ELECTROCARDIOGRAM TRACING: CPT

## 2018-07-27 PROCEDURE — 25000132 ZZH RX MED GY IP 250 OP 250 PS 637: Performed by: INTERNAL MEDICINE

## 2018-07-27 PROCEDURE — 27210795 ZZH PAD DEFIB QUICK CR4

## 2018-07-27 PROCEDURE — 93016 CV STRESS TEST SUPVJ ONLY: CPT | Performed by: INTERNAL MEDICINE

## 2018-07-27 PROCEDURE — 78452 HT MUSCLE IMAGE SPECT MULT: CPT

## 2018-07-27 PROCEDURE — C1887 CATHETER, GUIDING: HCPCS

## 2018-07-27 PROCEDURE — 99152 MOD SED SAME PHYS/QHP 5/>YRS: CPT

## 2018-07-27 PROCEDURE — 85027 COMPLETE CBC AUTOMATED: CPT | Performed by: PHYSICIAN ASSISTANT

## 2018-07-27 PROCEDURE — 85347 COAGULATION TIME ACTIVATED: CPT

## 2018-07-27 PROCEDURE — C1725 CATH, TRANSLUMIN NON-LASER: HCPCS

## 2018-07-27 PROCEDURE — 99233 SBSQ HOSP IP/OBS HIGH 50: CPT | Mod: 25 | Performed by: INTERNAL MEDICINE

## 2018-07-27 PROCEDURE — 34300033 ZZH RX 343: Performed by: INTERNAL MEDICINE

## 2018-07-27 PROCEDURE — 25000125 ZZHC RX 250: Performed by: INTERNAL MEDICINE

## 2018-07-27 PROCEDURE — 27210892 ZZH CATH CR4

## 2018-07-27 PROCEDURE — C9600 PERC DRUG-EL COR STENT SING: HCPCS

## 2018-07-27 PROCEDURE — C1769 GUIDE WIRE: HCPCS

## 2018-07-27 PROCEDURE — 99153 MOD SED SAME PHYS/QHP EA: CPT

## 2018-07-27 PROCEDURE — 93018 CV STRESS TEST I&R ONLY: CPT | Performed by: INTERNAL MEDICINE

## 2018-07-27 PROCEDURE — 36415 COLL VENOUS BLD VENIPUNCTURE: CPT | Performed by: PHYSICIAN ASSISTANT

## 2018-07-27 PROCEDURE — 99232 SBSQ HOSP IP/OBS MODERATE 35: CPT | Performed by: INTERNAL MEDICINE

## 2018-07-27 PROCEDURE — 21000001 ZZH R&B HEART CARE

## 2018-07-27 PROCEDURE — 93017 CV STRESS TEST TRACING ONLY: CPT

## 2018-07-27 PROCEDURE — 27210787 ZZH MANIFOLD CR2

## 2018-07-27 PROCEDURE — 027034Z DILATION OF CORONARY ARTERY, ONE ARTERY WITH DRUG-ELUTING INTRALUMINAL DEVICE, PERCUTANEOUS APPROACH: ICD-10-PCS | Performed by: INTERNAL MEDICINE

## 2018-07-27 PROCEDURE — 92928 PRQ TCAT PLMT NTRAC ST 1 LES: CPT | Mod: LD | Performed by: INTERNAL MEDICINE

## 2018-07-27 PROCEDURE — 93010 ELECTROCARDIOGRAM REPORT: CPT | Performed by: INTERNAL MEDICINE

## 2018-07-27 PROCEDURE — 36415 COLL VENOUS BLD VENIPUNCTURE: CPT | Performed by: INTERNAL MEDICINE

## 2018-07-27 PROCEDURE — 85520 HEPARIN ASSAY: CPT | Performed by: INTERNAL MEDICINE

## 2018-07-27 PROCEDURE — 99152 MOD SED SAME PHYS/QHP 5/>YRS: CPT | Performed by: INTERNAL MEDICINE

## 2018-07-27 PROCEDURE — 78452 HT MUSCLE IMAGE SPECT MULT: CPT | Mod: 26 | Performed by: INTERNAL MEDICINE

## 2018-07-27 PROCEDURE — 27210759 ZZH DEVICE INFLATION CR6

## 2018-07-27 PROCEDURE — 25000128 H RX IP 250 OP 636: Performed by: INTERNAL MEDICINE

## 2018-07-27 PROCEDURE — A9502 TC99M TETROFOSMIN: HCPCS | Performed by: INTERNAL MEDICINE

## 2018-07-27 PROCEDURE — 27210914 ZZH SHEATH CR8

## 2018-07-27 RX ORDER — FUROSEMIDE 10 MG/ML
20-100 INJECTION INTRAMUSCULAR; INTRAVENOUS
Status: DISCONTINUED | OUTPATIENT
Start: 2018-07-27 | End: 2018-07-27 | Stop reason: HOSPADM

## 2018-07-27 RX ORDER — NITROGLYCERIN 20 MG/100ML
.07-1.66 INJECTION INTRAVENOUS CONTINUOUS PRN
Status: DISCONTINUED | OUTPATIENT
Start: 2018-07-27 | End: 2018-07-27 | Stop reason: HOSPADM

## 2018-07-27 RX ORDER — NITROGLYCERIN 5 MG/ML
100-200 VIAL (ML) INTRAVENOUS
Status: DISCONTINUED | OUTPATIENT
Start: 2018-07-27 | End: 2018-07-27 | Stop reason: HOSPADM

## 2018-07-27 RX ORDER — LIDOCAINE 40 MG/G
CREAM TOPICAL
Status: DISCONTINUED | OUTPATIENT
Start: 2018-07-27 | End: 2018-07-27 | Stop reason: HOSPADM

## 2018-07-27 RX ORDER — PROTAMINE SULFATE 10 MG/ML
1-5 INJECTION, SOLUTION INTRAVENOUS
Status: DISCONTINUED | OUTPATIENT
Start: 2018-07-27 | End: 2018-07-27 | Stop reason: HOSPADM

## 2018-07-27 RX ORDER — SODIUM CHLORIDE 450 MG/100ML
INJECTION, SOLUTION INTRAVENOUS CONTINUOUS
Status: DISCONTINUED | OUTPATIENT
Start: 2018-07-27 | End: 2018-07-27 | Stop reason: HOSPADM

## 2018-07-27 RX ORDER — PRASUGREL 10 MG/1
10-60 TABLET, FILM COATED ORAL
Status: DISCONTINUED | OUTPATIENT
Start: 2018-07-27 | End: 2018-07-27 | Stop reason: HOSPADM

## 2018-07-27 RX ORDER — METOPROLOL TARTRATE 1 MG/ML
5 INJECTION, SOLUTION INTRAVENOUS EVERY 5 MIN PRN
Status: DISCONTINUED | OUTPATIENT
Start: 2018-07-27 | End: 2018-07-27 | Stop reason: HOSPADM

## 2018-07-27 RX ORDER — METOPROLOL TARTRATE 25 MG/1
25 TABLET, FILM COATED ORAL 2 TIMES DAILY
Status: DISCONTINUED | OUTPATIENT
Start: 2018-07-27 | End: 2018-07-28 | Stop reason: HOSPADM

## 2018-07-27 RX ORDER — NALOXONE HYDROCHLORIDE 0.4 MG/ML
0.4 INJECTION, SOLUTION INTRAMUSCULAR; INTRAVENOUS; SUBCUTANEOUS EVERY 5 MIN PRN
Status: DISCONTINUED | OUTPATIENT
Start: 2018-07-27 | End: 2018-07-27 | Stop reason: HOSPADM

## 2018-07-27 RX ORDER — NITROGLYCERIN 0.4 MG/1
0.4 TABLET SUBLINGUAL EVERY 5 MIN PRN
Status: DISCONTINUED | OUTPATIENT
Start: 2018-07-27 | End: 2018-07-27 | Stop reason: HOSPADM

## 2018-07-27 RX ORDER — DEXTROSE MONOHYDRATE 25 G/50ML
12.5-5 INJECTION, SOLUTION INTRAVENOUS EVERY 30 MIN PRN
Status: DISCONTINUED | OUTPATIENT
Start: 2018-07-27 | End: 2018-07-27 | Stop reason: HOSPADM

## 2018-07-27 RX ORDER — ACETAMINOPHEN 325 MG/1
325-650 TABLET ORAL EVERY 4 HOURS PRN
Status: DISCONTINUED | OUTPATIENT
Start: 2018-07-27 | End: 2018-07-27

## 2018-07-27 RX ORDER — DOPAMINE HYDROCHLORIDE 160 MG/100ML
2-20 INJECTION, SOLUTION INTRAVENOUS CONTINUOUS PRN
Status: DISCONTINUED | OUTPATIENT
Start: 2018-07-27 | End: 2018-07-27 | Stop reason: HOSPADM

## 2018-07-27 RX ORDER — POTASSIUM CHLORIDE 1500 MG/1
20 TABLET, EXTENDED RELEASE ORAL
Status: COMPLETED | OUTPATIENT
Start: 2018-07-27 | End: 2018-07-27

## 2018-07-27 RX ORDER — POTASSIUM CHLORIDE 29.8 MG/ML
20 INJECTION INTRAVENOUS
Status: DISCONTINUED | OUTPATIENT
Start: 2018-07-27 | End: 2018-07-27 | Stop reason: HOSPADM

## 2018-07-27 RX ORDER — NALOXONE HYDROCHLORIDE 0.4 MG/ML
.1-.4 INJECTION, SOLUTION INTRAMUSCULAR; INTRAVENOUS; SUBCUTANEOUS
Status: DISCONTINUED | OUTPATIENT
Start: 2018-07-27 | End: 2018-07-27

## 2018-07-27 RX ORDER — ONDANSETRON 2 MG/ML
4 INJECTION INTRAMUSCULAR; INTRAVENOUS EVERY 4 HOURS PRN
Status: DISCONTINUED | OUTPATIENT
Start: 2018-07-27 | End: 2018-07-27 | Stop reason: HOSPADM

## 2018-07-27 RX ORDER — DIPHENHYDRAMINE HYDROCHLORIDE 50 MG/ML
25-50 INJECTION INTRAMUSCULAR; INTRAVENOUS
Status: DISCONTINUED | OUTPATIENT
Start: 2018-07-27 | End: 2018-07-27 | Stop reason: HOSPADM

## 2018-07-27 RX ORDER — FENTANYL CITRATE 50 UG/ML
25-50 INJECTION, SOLUTION INTRAMUSCULAR; INTRAVENOUS
Status: DISCONTINUED | OUTPATIENT
Start: 2018-07-27 | End: 2018-07-27 | Stop reason: HOSPADM

## 2018-07-27 RX ORDER — SODIUM NITROPRUSSIDE 25 MG/ML
100-200 INJECTION INTRAVENOUS
Status: DISCONTINUED | OUTPATIENT
Start: 2018-07-27 | End: 2018-07-27 | Stop reason: HOSPADM

## 2018-07-27 RX ORDER — ENALAPRILAT 1.25 MG/ML
1.25-2.5 INJECTION INTRAVENOUS
Status: DISCONTINUED | OUTPATIENT
Start: 2018-07-27 | End: 2018-07-27 | Stop reason: HOSPADM

## 2018-07-27 RX ORDER — FLUMAZENIL 0.1 MG/ML
0.2 INJECTION, SOLUTION INTRAVENOUS
Status: ACTIVE | OUTPATIENT
Start: 2018-07-27 | End: 2018-07-28

## 2018-07-27 RX ORDER — IOPAMIDOL 755 MG/ML
80 INJECTION, SOLUTION INTRAVASCULAR ONCE
Status: DISCONTINUED | OUTPATIENT
Start: 2018-07-27 | End: 2018-07-28 | Stop reason: HOSPADM

## 2018-07-27 RX ORDER — FENTANYL CITRATE 50 UG/ML
25-50 INJECTION, SOLUTION INTRAMUSCULAR; INTRAVENOUS
Status: ACTIVE | OUTPATIENT
Start: 2018-07-27 | End: 2018-07-28

## 2018-07-27 RX ORDER — SODIUM CHLORIDE 9 MG/ML
INJECTION, SOLUTION INTRAVENOUS CONTINUOUS
Status: ACTIVE | OUTPATIENT
Start: 2018-07-27 | End: 2018-07-27

## 2018-07-27 RX ORDER — LORAZEPAM 2 MG/ML
.5-2 INJECTION INTRAMUSCULAR EVERY 4 HOURS PRN
Status: DISCONTINUED | OUTPATIENT
Start: 2018-07-27 | End: 2018-07-27 | Stop reason: HOSPADM

## 2018-07-27 RX ORDER — MORPHINE SULFATE 2 MG/ML
1-2 INJECTION, SOLUTION INTRAMUSCULAR; INTRAVENOUS EVERY 5 MIN PRN
Status: DISCONTINUED | OUTPATIENT
Start: 2018-07-27 | End: 2018-07-27 | Stop reason: HOSPADM

## 2018-07-27 RX ORDER — LORAZEPAM 0.5 MG/1
0.5 TABLET ORAL
Status: DISCONTINUED | OUTPATIENT
Start: 2018-07-27 | End: 2018-07-27 | Stop reason: HOSPADM

## 2018-07-27 RX ORDER — NIFEDIPINE 10 MG/1
10 CAPSULE ORAL
Status: DISCONTINUED | OUTPATIENT
Start: 2018-07-27 | End: 2018-07-27 | Stop reason: HOSPADM

## 2018-07-27 RX ORDER — POTASSIUM CHLORIDE 7.45 MG/ML
10 INJECTION INTRAVENOUS
Status: DISCONTINUED | OUTPATIENT
Start: 2018-07-27 | End: 2018-07-27 | Stop reason: HOSPADM

## 2018-07-27 RX ORDER — PROTAMINE SULFATE 10 MG/ML
25-100 INJECTION, SOLUTION INTRAVENOUS EVERY 5 MIN PRN
Status: DISCONTINUED | OUTPATIENT
Start: 2018-07-27 | End: 2018-07-27 | Stop reason: HOSPADM

## 2018-07-27 RX ORDER — LIDOCAINE HYDROCHLORIDE 10 MG/ML
1-10 INJECTION, SOLUTION EPIDURAL; INFILTRATION; INTRACAUDAL; PERINEURAL
Status: COMPLETED | OUTPATIENT
Start: 2018-07-27 | End: 2018-07-27

## 2018-07-27 RX ORDER — ATROPINE SULFATE 0.1 MG/ML
0.5 INJECTION INTRAVENOUS EVERY 5 MIN PRN
Status: ACTIVE | OUTPATIENT
Start: 2018-07-27 | End: 2018-07-28

## 2018-07-27 RX ORDER — DOBUTAMINE HYDROCHLORIDE 200 MG/100ML
2-20 INJECTION INTRAVENOUS CONTINUOUS PRN
Status: DISCONTINUED | OUTPATIENT
Start: 2018-07-27 | End: 2018-07-27 | Stop reason: HOSPADM

## 2018-07-27 RX ORDER — HYDRALAZINE HYDROCHLORIDE 20 MG/ML
10-20 INJECTION INTRAMUSCULAR; INTRAVENOUS
Status: DISCONTINUED | OUTPATIENT
Start: 2018-07-27 | End: 2018-07-27 | Stop reason: HOSPADM

## 2018-07-27 RX ORDER — NALOXONE HYDROCHLORIDE 0.4 MG/ML
.2-.4 INJECTION, SOLUTION INTRAMUSCULAR; INTRAVENOUS; SUBCUTANEOUS
Status: ACTIVE | OUTPATIENT
Start: 2018-07-27 | End: 2018-07-28

## 2018-07-27 RX ORDER — LIDOCAINE HYDROCHLORIDE 10 MG/ML
30 INJECTION, SOLUTION EPIDURAL; INFILTRATION; INTRACAUDAL; PERINEURAL
Status: DISCONTINUED | OUTPATIENT
Start: 2018-07-27 | End: 2018-07-27 | Stop reason: HOSPADM

## 2018-07-27 RX ORDER — EPINEPHRINE 1 MG/ML
0.3 INJECTION, SOLUTION, CONCENTRATE INTRAVENOUS
Status: DISCONTINUED | OUTPATIENT
Start: 2018-07-27 | End: 2018-07-27 | Stop reason: HOSPADM

## 2018-07-27 RX ORDER — ATROPINE SULFATE 0.1 MG/ML
.5-1 INJECTION INTRAVENOUS
Status: DISCONTINUED | OUTPATIENT
Start: 2018-07-27 | End: 2018-07-27 | Stop reason: HOSPADM

## 2018-07-27 RX ORDER — PHENYLEPHRINE HCL IN 0.9% NACL 1 MG/10 ML
20-100 SYRINGE (ML) INTRAVENOUS
Status: DISCONTINUED | OUTPATIENT
Start: 2018-07-27 | End: 2018-07-27 | Stop reason: HOSPADM

## 2018-07-27 RX ORDER — VERAPAMIL HYDROCHLORIDE 2.5 MG/ML
1-2.5 INJECTION, SOLUTION INTRAVENOUS
Status: COMPLETED | OUTPATIENT
Start: 2018-07-27 | End: 2018-07-27

## 2018-07-27 RX ORDER — ASPIRIN 81 MG/1
81-324 TABLET, CHEWABLE ORAL
Status: DISCONTINUED | OUTPATIENT
Start: 2018-07-27 | End: 2018-07-27 | Stop reason: HOSPADM

## 2018-07-27 RX ORDER — BUPIVACAINE HYDROCHLORIDE 2.5 MG/ML
1-10 INJECTION, SOLUTION EPIDURAL; INFILTRATION; INTRACAUDAL
Status: DISCONTINUED | OUTPATIENT
Start: 2018-07-27 | End: 2018-07-27 | Stop reason: HOSPADM

## 2018-07-27 RX ORDER — NITROGLYCERIN 0.4 MG/1
0.4 TABLET SUBLINGUAL EVERY 5 MIN PRN
Status: DISCONTINUED | OUTPATIENT
Start: 2018-07-27 | End: 2018-07-27

## 2018-07-27 RX ORDER — ASPIRIN 325 MG
325 TABLET ORAL
Status: DISCONTINUED | OUTPATIENT
Start: 2018-07-27 | End: 2018-07-27 | Stop reason: HOSPADM

## 2018-07-27 RX ORDER — CLOPIDOGREL 300 MG/1
300-600 TABLET, FILM COATED ORAL
Status: COMPLETED | OUTPATIENT
Start: 2018-07-27 | End: 2018-07-27

## 2018-07-27 RX ORDER — HEPARIN SODIUM 1000 [USP'U]/ML
1000-10000 INJECTION, SOLUTION INTRAVENOUS; SUBCUTANEOUS EVERY 5 MIN PRN
Status: DISCONTINUED | OUTPATIENT
Start: 2018-07-27 | End: 2018-07-27 | Stop reason: HOSPADM

## 2018-07-27 RX ORDER — NICARDIPINE HYDROCHLORIDE 2.5 MG/ML
100 INJECTION INTRAVENOUS
Status: DISCONTINUED | OUTPATIENT
Start: 2018-07-27 | End: 2018-07-27 | Stop reason: HOSPADM

## 2018-07-27 RX ORDER — FLUMAZENIL 0.1 MG/ML
0.2 INJECTION, SOLUTION INTRAVENOUS
Status: DISCONTINUED | OUTPATIENT
Start: 2018-07-27 | End: 2018-07-27 | Stop reason: HOSPADM

## 2018-07-27 RX ORDER — HYDROCODONE BITARTRATE AND ACETAMINOPHEN 5; 325 MG/1; MG/1
1-2 TABLET ORAL EVERY 4 HOURS PRN
Status: DISCONTINUED | OUTPATIENT
Start: 2018-07-27 | End: 2018-07-27

## 2018-07-27 RX ORDER — CLOPIDOGREL BISULFATE 75 MG/1
75 TABLET ORAL
Status: DISCONTINUED | OUTPATIENT
Start: 2018-07-27 | End: 2018-07-27 | Stop reason: HOSPADM

## 2018-07-27 RX ORDER — ADENOSINE 3 MG/ML
12-12000 INJECTION, SOLUTION INTRAVENOUS
Status: DISCONTINUED | OUTPATIENT
Start: 2018-07-27 | End: 2018-07-27 | Stop reason: HOSPADM

## 2018-07-27 RX ORDER — METHYLPREDNISOLONE SODIUM SUCCINATE 125 MG/2ML
125 INJECTION, POWDER, LYOPHILIZED, FOR SOLUTION INTRAMUSCULAR; INTRAVENOUS
Status: DISCONTINUED | OUTPATIENT
Start: 2018-07-27 | End: 2018-07-27 | Stop reason: HOSPADM

## 2018-07-27 RX ORDER — ASPIRIN 81 MG/1
81 TABLET ORAL DAILY
Status: DISCONTINUED | OUTPATIENT
Start: 2018-07-27 | End: 2018-07-27

## 2018-07-27 RX ORDER — NITROGLYCERIN 5 MG/ML
100-500 VIAL (ML) INTRAVENOUS
Status: COMPLETED | OUTPATIENT
Start: 2018-07-27 | End: 2018-07-27

## 2018-07-27 RX ORDER — LORAZEPAM 2 MG/ML
0.5 INJECTION INTRAMUSCULAR
Status: DISCONTINUED | OUTPATIENT
Start: 2018-07-27 | End: 2018-07-27 | Stop reason: HOSPADM

## 2018-07-27 RX ADMIN — METOPROLOL TARTRATE 25 MG: 25 TABLET ORAL at 20:54

## 2018-07-27 RX ADMIN — VERAPAMIL HYDROCHLORIDE 2.5 MG: 2.5 INJECTION, SOLUTION INTRAVENOUS at 16:38

## 2018-07-27 RX ADMIN — ASPIRIN 81 MG: 81 TABLET, COATED ORAL at 08:39

## 2018-07-27 RX ADMIN — ISOSORBIDE MONONITRATE 30 MG: 30 TABLET, EXTENDED RELEASE ORAL at 08:39

## 2018-07-27 RX ADMIN — TICAGRELOR 90 MG: 90 TABLET ORAL at 08:40

## 2018-07-27 RX ADMIN — ATORVASTATIN CALCIUM 40 MG: 40 TABLET, FILM COATED ORAL at 08:39

## 2018-07-27 RX ADMIN — SODIUM CHLORIDE: 4.5 INJECTION, SOLUTION INTRAVENOUS at 15:31

## 2018-07-27 RX ADMIN — VENLAFAXINE 37.5 MG: 37.5 TABLET ORAL at 10:38

## 2018-07-27 RX ADMIN — NITROGLYCERIN 200 MCG: 5 INJECTION, SOLUTION INTRAVENOUS at 16:39

## 2018-07-27 RX ADMIN — TICAGRELOR 90 MG: 90 TABLET ORAL at 20:54

## 2018-07-27 RX ADMIN — SODIUM CHLORIDE: 9 INJECTION, SOLUTION INTRAVENOUS at 18:02

## 2018-07-27 RX ADMIN — HEPARIN SODIUM 5000 UNITS: 1000 INJECTION, SOLUTION INTRAVENOUS; SUBCUTANEOUS at 16:48

## 2018-07-27 RX ADMIN — VENLAFAXINE 37.5 MG: 37.5 TABLET ORAL at 20:54

## 2018-07-27 RX ADMIN — LIDOCAINE HYDROCHLORIDE 2 ML: 10 INJECTION, SOLUTION EPIDURAL; INFILTRATION; INTRACAUDAL; PERINEURAL at 16:37

## 2018-07-27 RX ADMIN — HEPARIN SODIUM 5000 UNITS: 1000 INJECTION, SOLUTION INTRAVENOUS; SUBCUTANEOUS at 16:40

## 2018-07-27 RX ADMIN — LISINOPRIL 40 MG: 40 TABLET ORAL at 21:24

## 2018-07-27 RX ADMIN — TETROFOSMIN 12 MCI.: 1.38 INJECTION, POWDER, LYOPHILIZED, FOR SOLUTION INTRAVENOUS at 09:12

## 2018-07-27 RX ADMIN — NITROGLYCERIN 200 MCG: 5 INJECTION, SOLUTION INTRAVENOUS at 17:00

## 2018-07-27 RX ADMIN — MIDAZOLAM 4 MG: 1 INJECTION INTRAMUSCULAR; INTRAVENOUS at 17:06

## 2018-07-27 RX ADMIN — FENTANYL CITRATE 100 MCG: 50 INJECTION, SOLUTION INTRAMUSCULAR; INTRAVENOUS at 17:06

## 2018-07-27 RX ADMIN — POTASSIUM CHLORIDE 20 MEQ: 1500 TABLET, EXTENDED RELEASE ORAL at 15:35

## 2018-07-27 RX ADMIN — ACETAMINOPHEN 650 MG: 325 TABLET, FILM COATED ORAL at 07:44

## 2018-07-27 RX ADMIN — OMEPRAZOLE 20 MG: 20 CAPSULE, DELAYED RELEASE ORAL at 08:39

## 2018-07-27 RX ADMIN — ACETAMINOPHEN 650 MG: 325 TABLET, FILM COATED ORAL at 00:40

## 2018-07-27 RX ADMIN — ACETAMINOPHEN 650 MG: 325 TABLET, FILM COATED ORAL at 13:45

## 2018-07-27 RX ADMIN — CLOPIDOGREL BISULFATE 600 MG: 300 TABLET, FILM COATED ORAL at 17:02

## 2018-07-27 RX ADMIN — OMEPRAZOLE 20 MG: 20 CAPSULE, DELAYED RELEASE ORAL at 20:54

## 2018-07-27 RX ADMIN — TETROFOSMIN 35.2 MCI.: 1.38 INJECTION, POWDER, LYOPHILIZED, FOR SOLUTION INTRAVENOUS at 12:34

## 2018-07-27 ASSESSMENT — PAIN DESCRIPTION - DESCRIPTORS
DESCRIPTORS: HEADACHE
DESCRIPTORS: BURNING

## 2018-07-27 ASSESSMENT — ACTIVITIES OF DAILY LIVING (ADL)
ADLS_ACUITY_SCORE: 11

## 2018-07-27 NOTE — PLAN OF CARE
Problem: Patient Care Overview  Goal: Plan of Care/Patient Progress Review  Outcome: Improving  A&Ox4. VSS on RA. R groin incision site wdl, no numbness no tingling, cms intact. Up independently in the room. Tele SR. Pt reported a headache, relieved with tylenol and a cold cloth. Plan for discharge today. Will continue to monitor.

## 2018-07-27 NOTE — PROGRESS NOTES
[facility]    Hospitalist Progress Note    Date of Service (when I saw the patient): 07/27/2018    Assessment & Plan   Mr. Darryl Uribe is a 58-year-old male with a history of coronary artery disease, status post PCI in 2017, who was admitted on 7/25/2018 with escalating chest pain.      Unstable angina in the context of stenting of the right coronary artery in June 2017 with ongoing dual antiplatelet therapy. The patient presents with worsening exercise tolerance associated with chest pain related to exertion that has been progressively getting worse.  He could not undergo a stress test due to ongoing symptoms.  He is admitted with concerns for unstable angina.    - Continue heparin drip    - Serial troponin 0.026-->0.024-->0.028  - Cardiology consulted,appreciated.    - Continue aspirin and Brilinta.    - Continue his Lipitor, lisinopril and metoprolol.   - Treadmill perfusion scan demonstrated a small area of apical ischemia with exercise  - Coronary angiogram later today     Benign essential hypertension.  Blood pressure is slightly on the higher side. PTA regimen includes lisinopril 40 mg daily, metoprolol 25 mg twice a day.    - Continue lisinopril and metoprolol.  - Adjust as needed.      Mild thrombocytopenia. Platelets on admission 106, he has had normal platelets in the past.  - Recheck normal at 191.      History of ulcerative colitis.  The patient is status post proctocolectomy and has an end ileostomy.  No active issues at the moment, he is not on any specific treatment for UC.      # Pain Assessment:  Current Pain Score 7/26/2018   Patient currently in pain? -   Pain score (0-10) 2   Pain location -   Pain descriptors -   - Darryl is experiencing pain due to headache. Pain management was discussed and the plan was created in a collaborative fashion.  Darryl's response to the current recommendations: engaged  compliant  - Pharmacologic adjuvants: Acetaminophen     DVT Prophylaxis: IV  heparin  Code Status: Full Code     Disposition: Expected discharge pending angiogram results and cardiology input.     Lucian Benedict Russ  Text Page (7 am to 6 pm)    Interval History   The patient is resting in bed.  He is chest pain free now, but had chest tightness with exercise study.    -Data reviewed today: I reviewed all new labs and imaging results over the last 24 hours. I personally reviewed no images or EKG's today.    Physical Exam   Temp: 98.4  F (36.9  C) Temp src: Oral BP: 103/63   Heart Rate: 69 Resp: 18 SpO2: 95 % O2 Device: None (Room air)    Vitals:    07/25/18 1948 07/26/18 0210 07/27/18 0537   Weight: 118.4 kg (261 lb) 120.4 kg (265 lb 6.4 oz) 120.5 kg (265 lb 9.6 oz)     Vital Signs with Ranges  Temp:  [97.4  F (36.3  C)-98.5  F (36.9  C)] 98.4  F (36.9  C)  Heart Rate:  [59-80] 69  Resp:  [8-23] 18  BP: (103-129)/(58-87) 103/63  SpO2:  [95 %-99 %] 95 %  I/O last 3 completed shifts:  In: 240 [P.O.:240]  Out: -     Gen: Well nourished, well developed, alert and oriented x 3, no acute distressed  HEENT: Atraumatic, normocephalic  Lungs: Clear to ausculation without wheezes, rhonchi, or rales  Heart: Regular rate and rhythm, no murmurs, gallops, or rubs  GI: Bowel sound normal, no hepatosplenomegaly or masses  Lymph: No lymphadenopathy or edema  Skin: No rashes     Medications     NaCl 75 mL/hr at 07/27/18 1531     Continuing ACE inhibitor/ARB/ARNI from home medication list OR ACE inhibitor/ARB order already placed during this visit       - MEDICATION INSTRUCTIONS -       - MEDICATION INSTRUCTIONS -       - MEDICATION INSTRUCTIONS -       - MEDICATION INSTRUCTIONS -       ASPIRIN NOT PRESCRIBED       sodium chloride Stopped (07/26/18 1915)     sodium chloride 75 mL (07/26/18 1035)       aspirin  81 mg Oral Daily     atorvastatin  40 mg Oral Daily     isosorbide mononitrate  30 mg Oral Daily     lisinopril  40 mg Oral At Bedtime     omeprazole  20 mg Oral BID     sodium chloride (PF)  10 mL  Intravenous Once     sodium chloride (PF)  3 mL Intracatheter Q8H     sodium chloride (PF)  3 mL Intracatheter Q8H     ticagrelor (BRILINTA) tablet 90 mg  90 mg Oral BID     venlafaxine  37.5 mg Oral BID       Data     Recent Labs  Lab 07/27/18  1432 07/26/18  0940 07/25/18  2315 07/25/18  1736 07/25/18  1706   WBC 8.4 5.7  --   --  4.9   HGB 12.0* 12.9*  --   --  13.1*   MCV 87 87  --   --  88    191  --   --  106*   INR  --   --   --  1.09 Canceled, Test credited    139  --   --  139   POTASSIUM 3.9 4.1  --   --  4.5   CHLORIDE 106 107  --   --  106   CO2 25 23  --   --  25   BUN 14 16  --   --  18   CR 0.87 0.81  --   --  0.96   ANIONGAP 8 9  --   --  8   DYANA 8.8 8.4*  --   --  9.0   GLC 92 108  --   --  93   TROPI  --  0.028 0.024  --  0.026       Recent Results (from the past 24 hour(s))   NM Exercise stress test (nuc card)    Narrative    GATED MYOCARDIAL PERFUSION SCINTIGRAPHY EXERCISE- ONE DAY STUDY     7/27/2018 1:03 PM  ROHITH DALY  58 years  Male  1960.    Indication/Clinical History: Chest pain    Impression  1.  Myocardial perfusion imaging using single isotope technique  demonstrated small size mild intensity apical ischemia.   2. Patient exercised 7 minutes 14 seconds achieving 10.1 METs. Patient  developed 5/10 chest pain at peak exercise that resolved 4 minutes  into recovery. There were no EKG changes suggestive of ischemia.  3. Gated images demonstrated no regional wall motion abnormalities.   The left ventricular systolic function is hyperdynamic with LVEF of  82% at rest.  4. No prior study for comparison.    Procedure  The patient performed treadmill exercise using a Que protocol,  completing 7 minutes and 14 seconds with an estimated workload of 10.1  METS.  The test was terminated due to shortness of breath. The heart  rate was 62 beats per minute at baseline and increased to 144 beats at  peak exercise, which was 89% of the maximum predicted heart rate. The  rest blood  pressure was 113/69 mm/Hg and peak blood pressure is  180/70mm/Hg with rate pressure product of 25,920. The patient  experienced 5/10 chest pain at peak exercise that resolved 4 minutes  into recovery during the test. The patient was on a beta blocker.    Myocardial perfusion imaging was performed at rest, approximately 45  minutes after the injection intravenously of 12of Tc-99m Myoview. At  peak exercise, the patient was injected intravenously with 35.2 mCi of   Tc-99m Myoview and exercise continued for approximately 1 minute.  Gated post-stress tomographic imaging was performed approximately 30  minutes after stress    EKG Findings  The resting EKG demonstrated sinus rhythm, T wave inversion in lead  III . The stress EKG demonstrated no significant ST-T changes compared  to baseline.    Tomographic Findings  Overall, the study quality is gated . On stress images there is small  size mild intensity apical wall photopenic defect. There is  essentially diffuse homogenous tracer uptake in rest of the myocardium  at rest and stress. Gated images demonstrated no regional wall motion  abnormalities. The left ventricular ejection fraction was calculated  to be 88% with stress and 82% at rest. TID was visually absent.    BOB DOSS MD

## 2018-07-27 NOTE — PLAN OF CARE
Problem: Patient Care Overview  Goal: Plan of Care/Patient Progress Review  CR: Order received; per chart review, patient underwent angio yesterday but scheduled to undergo further exercise nuclear stress test today to guide need for stenting of the LAD. Patient will be rescheduled for Cardiac rehab as patient has currently not had any interventions.

## 2018-07-27 NOTE — PROGRESS NOTES
[facility]  Cardiology Progress Note    Date of Service (when I saw the patient): 07/27/2018  Primary Cardiologist: Dr. Amaya       Assessment & Plan   Darryl Uribe is a 58 year old male who was admitted on 7/25/2018 with chest discomfort.     Unstable Angina  -- S/P coronary angiogram 7/26 which showed LAD lesion but this was not felt to be significant based on FFR measurement. He was started on Imdur.  -- Plan for nuclear stress test today to assess the LAD territory.   -- Check BMP and CBC today    CAD   -- Hx of RCA stenting in 2017   -- Pta regimen included Brilinta and ASA  -- on BB and Acei     HTN   --  Well controlled with acei 40 mg and BB 25 mg BID     HLD  -- Atorvastatin 40 mg   -- LDL 65     Ulcerative Colitis  -- s/p protocolectomy with end ileostomy       Interval History   No new concerns.     Physical Exam   Temp: 98.2  F (36.8  C) Temp src: Oral BP: 112/77   Heart Rate: 71 Resp: 16 SpO2: 99 % O2 Device: None (Room air)    Vitals:    07/25/18 1948 07/26/18 0210 07/27/18 0537   Weight: 118.4 kg (261 lb) 120.4 kg (265 lb 6.4 oz) 120.5 kg (265 lb 9.6 oz)     GEN:  NAD  HEENT: Mucous membranes moist.  NECK:  No JVD.  C/V:  Regular rate and rhythm, no murmur, rub or gallop.   RESP: Respirations are unlabored. No use of accessory muscles. Clear to auscultation bilaterally without wheezing, rales, or rhonchi.  GI: Abdomen soft, nontender, nondistended.    EXTREM: No LE edema.   NEURO: Alert and oriented, cooperative.   PSYCH: Normal affect.  SKIN: Warm and dry.       Medications     Continuing ACE inhibitor/ARB/ARNI from home medication list OR ACE inhibitor/ARB order already placed during this visit       - MEDICATION INSTRUCTIONS -       - MEDICATION INSTRUCTIONS -       - MEDICATION INSTRUCTIONS -       sodium chloride Stopped (07/26/18 1915)     sodium chloride 75 mL (07/26/18 1035)       aspirin  81 mg Oral Daily     atorvastatin  40 mg Oral Daily     isosorbide mononitrate  30 mg  Oral Daily     lisinopril  40 mg Oral At Bedtime     omeprazole  20 mg Oral BID     sodium chloride (PF)  10 mL Intravenous Once     sodium chloride (PF)  3 mL Intracatheter Q8H     technetium Tc 99m tetrofosmin 2UD study  3-42 mCi Intravenous every 2 hours     ticagrelor (BRILINTA) tablet 90 mg  90 mg Oral BID     venlafaxine  37.5 mg Oral BID       Data   Labs, angiogram, CXR, vitals, ECG reviewed by me.     Tele: CALLIE Ballesteros PA-C   7/27/2018  Pager: [phone]

## 2018-07-27 NOTE — PLAN OF CARE
Problem: Patient Care Overview  Goal: Plan of Care/Patient Progress Review  Outcome: Improving  Pt. A&O x4 Independent,  VSS on Room Air. Tele SR, cms is intact. Right groin soft, ecchymotic no hematoma or bruit. Off bedrest at 1730. ileostomy care per pt. Plan to discharge tomorrow. Continue to monitor R groin site and VS.

## 2018-07-27 NOTE — PLAN OF CARE
"Problem: Patient Care Overview  Goal: Plan of Care/Patient Progress Review  Outcome: No Change  VSS.  Pt c/o of chest burning sensation \"feels like heartburn\" 2/10 post cath lab, per cath lab MD aware.  Now resolved.  Pt c/o of headache during the day, given tylenol x2, now resolved after coffee and food.  TR band on rt radial with 11cc.  Site is c/d/i, +2 pulses. Pt was plavix loaded in cath lab. On bedrest until 1900. Tele is SR w/ 1AVB.  Right groin is c/d/i, ecchymotic, palpable pulses.  IVF infusing at 75 ml/hr.  Plan for cardiac rehab tomorrow and probable discharge to home.        "

## 2018-07-27 NOTE — PROGRESS NOTES
Pt transferred from CCU, Pt denies chest pain or sob.  Pt will go to Cardiac Rehab and then likely discharge.  No new issues.

## 2018-07-27 NOTE — PROVIDER NOTIFICATION
MD Notification    Notified Person: MD    Notified Person Name: Dr. Le    Notification Date/Time: 7/26/18 @ 9316    Notification Interaction: paged physician    Purpose of Notification: Need BP parameters for medications (Imdur, metoprolol, and lisinopril)     Orders Received: parameters received.     Comments:

## 2018-07-27 NOTE — PROGRESS NOTES
Patient underwent a coronary angiogram yesterday which showed a 60-70% stenosis in his proximal LAD, however the flow reserve was normal at 0.96.  The right coronary artery stent is patent.  Left main normal.  Circumflex had moderate disease.    RECOMMENDATIONS:  1.  Although the flow reserve of the LAD stenosis was deemed not physiologically significant, patient gives a history of escalating angina and has not even been able to walk half a block over the last few weeks.  Therefore, we will do an exercise nuclear stress test today to guide need for stenting of the LAD.  The morning dose of metoprolol can be held.  2.  Cardiology will follow with the results of stress test.    Dr. ERNST Jennings MD [facility]  Cardiology

## 2018-07-28 ENCOUNTER — APPOINTMENT (OUTPATIENT)
Dept: OCCUPATIONAL THERAPY | Facility: CLINIC | Age: 58
DRG: 247 | End: 2018-07-28
Attending: INTERNAL MEDICINE
Payer: COMMERCIAL

## 2018-07-28 VITALS
SYSTOLIC BLOOD PRESSURE: 120 MMHG | RESPIRATION RATE: 18 BRPM | HEIGHT: 67 IN | BODY MASS INDEX: 41.53 KG/M2 | DIASTOLIC BLOOD PRESSURE: 83 MMHG | TEMPERATURE: 97.8 F | WEIGHT: 264.6 LBS | HEART RATE: 99 BPM | OXYGEN SATURATION: 96 %

## 2018-07-28 LAB
ANION GAP SERPL CALCULATED.3IONS-SCNC: 7 MMOL/L (ref 3–14)
BUN SERPL-MCNC: 11 MG/DL (ref 7–30)
CALCIUM SERPL-MCNC: 8.8 MG/DL (ref 8.5–10.1)
CHLORIDE SERPL-SCNC: 106 MMOL/L (ref 94–109)
CO2 SERPL-SCNC: 25 MMOL/L (ref 20–32)
CREAT SERPL-MCNC: 0.92 MG/DL (ref 0.66–1.25)
ERYTHROCYTE [DISTWIDTH] IN BLOOD BY AUTOMATED COUNT: 12.7 % (ref 10–15)
GFR SERPL CREATININE-BSD FRML MDRD: 84 ML/MIN/1.7M2
GLUCOSE SERPL-MCNC: 99 MG/DL (ref 70–99)
HCT VFR BLD AUTO: 36.2 % (ref 40–53)
HGB BLD-MCNC: 12.4 G/DL (ref 13.3–17.7)
INTERPRETATION ECG - MUSE: NORMAL
MCH RBC QN AUTO: 30.1 PG (ref 26.5–33)
MCHC RBC AUTO-ENTMCNC: 34.3 G/DL (ref 31.5–36.5)
MCV RBC AUTO: 88 FL (ref 78–100)
PLATELET # BLD AUTO: 170 10E9/L (ref 150–450)
POTASSIUM SERPL-SCNC: 3.9 MMOL/L (ref 3.4–5.3)
RBC # BLD AUTO: 4.12 10E12/L (ref 4.4–5.9)
SODIUM SERPL-SCNC: 138 MMOL/L (ref 133–144)
WBC # BLD AUTO: 6.5 10E9/L (ref 4–11)

## 2018-07-28 PROCEDURE — 25000132 ZZH RX MED GY IP 250 OP 250 PS 637: Performed by: INTERNAL MEDICINE

## 2018-07-28 PROCEDURE — 97165 OT EVAL LOW COMPLEX 30 MIN: CPT | Mod: GO

## 2018-07-28 PROCEDURE — 99239 HOSP IP/OBS DSCHRG MGMT >30: CPT | Performed by: INTERNAL MEDICINE

## 2018-07-28 PROCEDURE — 80048 BASIC METABOLIC PNL TOTAL CA: CPT | Performed by: INTERNAL MEDICINE

## 2018-07-28 PROCEDURE — 99232 SBSQ HOSP IP/OBS MODERATE 35: CPT | Performed by: INTERNAL MEDICINE

## 2018-07-28 PROCEDURE — 40000133 ZZH STATISTIC OT WARD VISIT

## 2018-07-28 PROCEDURE — 85027 COMPLETE CBC AUTOMATED: CPT | Performed by: INTERNAL MEDICINE

## 2018-07-28 PROCEDURE — 97110 THERAPEUTIC EXERCISES: CPT | Mod: GO

## 2018-07-28 PROCEDURE — 97535 SELF CARE MNGMENT TRAINING: CPT | Mod: GO

## 2018-07-28 PROCEDURE — 36415 COLL VENOUS BLD VENIPUNCTURE: CPT | Performed by: INTERNAL MEDICINE

## 2018-07-28 RX ORDER — ISOSORBIDE MONONITRATE 30 MG/1
30 TABLET, EXTENDED RELEASE ORAL DAILY
Qty: 30 TABLET | Refills: 3 | Status: SHIPPED | OUTPATIENT
Start: 2018-07-29 | End: 2018-08-02 | Stop reason: SINTOL

## 2018-07-28 RX ADMIN — ISOSORBIDE MONONITRATE 30 MG: 30 TABLET, EXTENDED RELEASE ORAL at 08:47

## 2018-07-28 RX ADMIN — ASPIRIN 81 MG: 81 TABLET, COATED ORAL at 08:47

## 2018-07-28 RX ADMIN — METOPROLOL TARTRATE 25 MG: 25 TABLET ORAL at 08:47

## 2018-07-28 RX ADMIN — OMEPRAZOLE 20 MG: 20 CAPSULE, DELAYED RELEASE ORAL at 08:47

## 2018-07-28 RX ADMIN — TICAGRELOR 90 MG: 90 TABLET ORAL at 08:46

## 2018-07-28 RX ADMIN — ATORVASTATIN CALCIUM 40 MG: 40 TABLET, FILM COATED ORAL at 08:46

## 2018-07-28 RX ADMIN — VENLAFAXINE 37.5 MG: 37.5 TABLET ORAL at 08:47

## 2018-07-28 ASSESSMENT — ACTIVITIES OF DAILY LIVING (ADL)
ADLS_ACUITY_SCORE: 11
ADLS_ACUITY_SCORE: 11
PREVIOUS_RESPONSIBILITIES: MEAL PREP;HOUSEKEEPING;LAUNDRY;SHOPPING;YARDWORK;MEDICATION MANAGEMENT;FINANCES;DRIVING;WORK
ADLS_ACUITY_SCORE: 11
ADLS_ACUITY_SCORE: 11

## 2018-07-28 NOTE — PLAN OF CARE
"Problem: Patient Care Overview  Goal: Plan of Care/Patient Progress Review  Outcome: Improving  Pt A/O X4. Up independently. Denies all pain. Tele shows SR with 1st deg AVB. Possible discharge today if Cardiac rehab goes well. RRA and LFA site intact. CMS intact. Pt has no complaints.    Blood pressure 125/64, pulse 77, temperature 98.6  F (37  C), temperature source Oral, resp. rate 26, height 1.702 m (5' 7\"), weight 120 kg (264 lb 9.6 oz), SpO2 95 %.          "

## 2018-07-28 NOTE — PLAN OF CARE
Problem: Patient Care Overview  Goal: Plan of Care/Patient Progress Review  OT/CR: Evaluation and treatment initiated. Pt lives in a two story home with his spouse. Prior pt independent in all I/ADLs and works full time. Pt reported that daily tasks became more effortful and that he was feeling SOB prior to admission. Pt is Status post PCI of the LAD   Discharge Planner OT   Patient plan for discharge: Home  Current status: Pt completed 13 minutes on the treadmill for 1.6-1.9 MPH, gradual increase in speed. Pt completed 18 stairs with modified independence. Pt demonstrated some fatigue, self rating 3/10 on OMNI Effort Scale.   Barriers to return to prior living situation: none  Recommendations for discharge: Home with OP CR at [facility]  Rationale for recommendations: Pt would benefit from continued education and monitoring during physical activity in OP CR       Entered by: Shahnaz Batista 07/28/2018 8:46 AM

## 2018-07-28 NOTE — PLAN OF CARE
Problem: Patient Care Overview  Goal: Plan of Care/Patient Progress Review  Occupational Therapy Discharge Summary    Reason for therapy discharge:    Discharged to home.    Progress towards therapy goal(s). See goals on Care Plan in [facility] electronic health record for goal details.  Goals partially met.  Barriers to achieving goals:   discharge from facility.    Therapy recommendation(s):    Continued therapy is recommended.  Rationale/Recommendations:  Home with OP CR at [facility].

## 2018-07-28 NOTE — DISCHARGE SUMMARY
[facility]    Discharge Summary  Hospitalist    Date of Admission:  7/25/2018  Date of Discharge:  7/28/2018  Discharging Provider: Reddy Rizzo  Date of Service (when I saw the patient): 07/28/18    Discharge Diagnoses     Unstable angina  CAD  Benign essential hypertension  Mild thrombocytopenia       History of Present Illness   Mr. Darryl Uribe is a 58-year-old male with a history of coronary artery disease, status post PCI in 2017, who was admitted on 7/25/2018 with escalating chest pain.     Hospital Course   Darryl Uribe was admitted on 7/25/2018.  The following problems were addressed during his hospitalization:        Unstable angina  CAD   In the context of stenting of the right coronary artery in June 2017 with ongoing dual antiplatelet therapy. The patient presents with worsening exercise tolerance associated with chest pain related to exertion that has been progressively getting worse.  He could not undergo a stress test due to ongoing symptoms. Treated with IV heparin, continued on aspirin and Brilinta and was seen by cardiology.  Serial troponin's 0.026-->0.024-->0.028. Treadmill perfusion scan demonstrated a small area of apical ischemia with exercise and prolonged symptoms. Underwent a coronary angiogram with PCI to the proximal LAD 7/27. He was doing well the following day and was discharged to follow up with his cardiologist in 1 week. He was continued on the medical regimen outlined below and will continue with DAPT for one year.        Benign essential hypertension.    Blood pressure is slightly on the higher side. PTA regimen includes lisinopril 40 mg daily, metoprolol 25 mg twice a day. Imdur 30 mg daily added this stay.       Mild thrombocytopenia.   Platelets on admission 106, he has had normal platelets in the past. Recheck normal at 191.       # Discharge Pain Plan:   - Patient currently has NO PAIN and is not being prescribed pain medications on  discharge.      Reddy Rizzo      Significant Results and Procedures   See below    Pending Results   These results will be followed up by PCP, Cardiology  Unresulted Labs Ordered in the Past 30 Days of this Admission     No orders found from 5/26/2018 to 7/26/2018.          Code Status   Full Code       Primary Care Physician   sOeas Correa    Physical Exam   Temp: 98.2  F (36.8  C) Temp src: Oral BP: (!) 133/91 Pulse: 104 Heart Rate: 102 Resp: 18 SpO2: 95 % O2 Device: None (Room air)    Vitals:    07/26/18 0210 07/27/18 0537 07/28/18 0030   Weight: 120.4 kg (265 lb 6.4 oz) 120.5 kg (265 lb 9.6 oz) 120 kg (264 lb 9.6 oz)     Vital Signs with Ranges  Temp:  [98.2  F (36.8  C)-98.6  F (37  C)] 98.2  F (36.8  C)  Pulse:  [104] 104  Heart Rate:  [] 102  Resp:  [14-29] 18  BP: (103-156)/() 133/91  SpO2:  [94 %-97 %] 95 %  I/O last 3 completed shifts:  In: 410 [P.O.:410]  Out: 250 [Urine:250]    Gen: Patient in no acute distress.  Appears comfortable.  Heart:  S1S2+, regular rate and rhythm, No murmurs.  Lungs:  Clear to auscultation, no wheezing, no rales.   Abdomen:  Soft, non tender, non distended, bowel sounds positive.  Extremities:  No edema.    Discharge Disposition   Discharged to home  Condition at discharge: Stable    Consultations This Hospital Stay   PHARMACY TO DOSE HEPARIN  CARDIAC REHAB IP CONSULT  CARDIOLOGY IP CONSULT  PHARMACY TO DOSE HEPARIN  PHARMACY TO DOSE HEPARIN  PHARMACY IP CONSULT  PHARMACY IP CONSULT  CARDIAC REHAB IP CONSULT  NUTRITION SERVICES ADULT IP CONSULT  PHARMACY IP CONSULT  PHARMACY IP CONSULT  SMOKING CESSATION PROGRAM IP CONSULT  SMOKING CESSATION PROGRAM IP CONSULT  SMOKING CESSATION PROGRAM IP CONSULT    Time Spent on this Encounter   I, Reddy Rizzo, personally saw the patient today and spent greater than 30 minutes discharging this patient.    Discharge Orders     Basic metabolic panel     Basic metabolic panel     CBC with platelets   Last Lab  Result: Hemoglobin (g/dL)      Date                     Value                07/27/2018               12.0 (L)         ----------     Follow-Up with Cardiac Advanced Practice Provider     Follow-Up with Cardiologist     Follow-Up with Cardiac Advanced Practice Provider     CARDIAC REHAB REFERRAL     EKG 12-lead complete w/read  (to be scheduled)     Reason for your hospital stay   This is a 58 year old male admitted with chest tightness.     Follow-up and recommended labs and tests    Follow up with Cardiology as directed.     Activity   Your activity upon discharge: activity as tolerated     Full Code     Diet   Follow this diet upon discharge: Orders Placed This Encounter     Low Salt Low Saturated Fat Diet       Discharge Medications   Current Discharge Medication List      START taking these medications    Details   isosorbide mononitrate (IMDUR) 30 MG 24 hr tablet Take 1 tablet (30 mg) by mouth daily  Qty: 30 tablet, Refills: 3    Associated Diagnoses: Unstable angina (H)         CONTINUE these medications which have CHANGED    Details   ticagrelor (BRILINTA) 90 MG tablet Take 1 tablet (90 mg) by mouth 2 times daily Filled on 7/10/18 for 30 days supply (60 tabs), no refills.  Qty: 60 tablet, Refills: 5    Associated Diagnoses: ACS (acute coronary syndrome) (H)         CONTINUE these medications which have NOT CHANGED    Details   ASPIRIN PO Take 81 mg by mouth every morning       atorvastatin (LIPITOR) 40 MG tablet Take 1 tablet (40 mg) by mouth daily  Qty: 30 tablet, Refills: 0    Associated Diagnoses: NSTEMI (non-ST elevated myocardial infarction) (H)      lisinopril (PRINIVIL/ZESTRIL) 40 MG tablet Take 1 tablet (40 mg) by mouth At Bedtime  Qty: 30 tablet, Refills: 0    Associated Diagnoses: NSTEMI (non-ST elevated myocardial infarction) (H)      metoprolol (LOPRESSOR) 25 MG tablet Take 1 tablet (25 mg) by mouth 2 times daily  Qty: 60 tablet, Refills: 0    Associated Diagnoses: NSTEMI (non-ST elevated  myocardial infarction) (H)      nitroGLYcerin (NITROSTAT) 0.4 MG sublingual tablet For chest pain place 1 tablet under the tongue every 5 minutes for 3 doses. If symptoms persist 5 minutes after 1st dose call 911.  Qty: 25 tablet, Refills: 1    Associated Diagnoses: Postsurgical percutaneous transluminal coronary angioplasty status; NSTEMI (non-ST elevated myocardial infarction) (H); Mixed hyperlipidemia      omeprazole (PRILOSEC) 20 MG CR capsule Take 20 mg by mouth 2 times daily      venlafaxine (EFFEXOR) 37.5 MG tablet Take 37.5 mg by mouth 2 times daily      VITAMIN D, CHOLECALCIFEROL, PO Take 400 Units by mouth daily           Allergies   No Known Allergies  Data   Most Recent 3 CBC's:  Recent Labs   Lab Test  07/28/18   0534  07/27/18   1432  07/26/18   0940   WBC  6.5  8.4  5.7   HGB  12.4*  12.0*  12.9*   MCV  88  87  87   PLT  170  184  191      Most Recent 3 BMP's:  Recent Labs   Lab Test  07/28/18   0534  07/27/18   1432  07/26/18   0940   NA  138  139  139   POTASSIUM  3.9  3.9  4.1   CHLORIDE  106  106  107   CO2  25  25  23   BUN  11  14  16   CR  0.92  0.87  0.81   ANIONGAP  7  8  9   DYANA  8.8  8.8  8.4*   GLC  99  92  108     Most Recent 2 LFT's:  Recent Labs   Lab Test 10/17/17  06/14/17   1714   AST  27  30   ALT  35  43   ALKPHOS  40  38*   BILITOTAL  0.46  0.4     Most Recent INR's and Anticoagulation Dosing History:  Anticoagulation Dose History     Recent Dosing and Labs Latest Ref Rng & Units 8/16/2009 6/15/2017 7/25/2018 7/25/2018    INR 0.86 - 1.14 1.22(H) 1.05 Canceled, Test credited 1.09        Most Recent 3 Troponin's:  Recent Labs   Lab Test  07/26/18   0940  07/25/18   2315  07/25/18   1706   TROPI  0.028  0.024  0.026     Most Recent Cholesterol Panel:  Recent Labs   Lab Test  07/27/18   1432   CHOL  128   LDL  43   HDL  40   TRIG  223*     Most Recent 6 Bacteria Isolates From Any Culture (See EPIC Reports for Culture Details):No lab results found.  Most Recent TSH, T4 and A1c  Labs:  Recent Labs   Lab Test  06/14/17   1714   A1C  5.8       Results for orders placed or performed during the hospital encounter of 07/25/18   XR Chest 2 Views    Narrative    CHEST TWO VIEWS    7/25/2018 6:12 PM     HISTORY: Chest pain.     COMPARISON: 6/14/2017 chest x-ray.      Impression    IMPRESSION: Heart size is normal. Pulmonary vasculature is normal.  Lungs are clear. No pleural fluid. No acute disease. No significant  change.    GIANNI CISNEROS MD   NM Exercise stress test (nuc card)    Narrative    GATED MYOCARDIAL PERFUSION SCINTIGRAPHY EXERCISE- ONE DAY STUDY     7/27/2018 1:03 PM  ROHITH DALY  58 years  Male  1960.    Indication/Clinical History: Chest pain    Impression  1.  Myocardial perfusion imaging using single isotope technique  demonstrated small size mild intensity apical ischemia.   2. Patient exercised 7 minutes 14 seconds achieving 10.1 METs. Patient  developed 5/10 chest pain at peak exercise that resolved 4 minutes  into recovery. There were no EKG changes suggestive of ischemia.  3. Gated images demonstrated no regional wall motion abnormalities.   The left ventricular systolic function is hyperdynamic with LVEF of  82% at rest.  4. No prior study for comparison.    Procedure  The patient performed treadmill exercise using a Que protocol,  completing 7 minutes and 14 seconds with an estimated workload of 10.1  METS.  The test was terminated due to shortness of breath. The heart  rate was 62 beats per minute at baseline and increased to 144 beats at  peak exercise, which was 89% of the maximum predicted heart rate. The  rest blood pressure was 113/69 mm/Hg and peak blood pressure is  180/70mm/Hg with rate pressure product of 25,920. The patient  experienced 5/10 chest pain at peak exercise that resolved 4 minutes  into recovery during the test. The patient was on a beta blocker.    Myocardial perfusion imaging was performed at rest, approximately 45  minutes after the injection  intravenously of 12of Tc-99m Myoview. At  peak exercise, the patient was injected intravenously with 35.2 mCi of   Tc-99m Myoview and exercise continued for approximately 1 minute.  Gated post-stress tomographic imaging was performed approximately 30  minutes after stress    EKG Findings  The resting EKG demonstrated sinus rhythm, T wave inversion in lead  III . The stress EKG demonstrated no significant ST-T changes compared  to baseline.    Tomographic Findings  Overall, the study quality is gated . On stress images there is small  size mild intensity apical wall photopenic defect. There is  essentially diffuse homogenous tracer uptake in rest of the myocardium  at rest and stress. Gated images demonstrated no regional wall motion  abnormalities. The left ventricular ejection fraction was calculated  to be 88% with stress and 82% at rest. TID was visually absent.    BOB DOSS MD

## 2018-07-28 NOTE — PROGRESS NOTES
07/28/18 0800   Quick Adds   Type of Visit Initial Occupational Therapy Evaluation   Living Environment   Lives With spouse   Living Arrangements house  (2 story home )   Home Accessibility bed and bath are not on the first floor   Number of Stairs to Enter Home 1   Number of Stairs Within Home 18   Transportation Available car;family or friend will provide   Living Environment Comment Spouse is available to assist as needed    Self-Care   Usual Activity Tolerance good   Current Activity Tolerance good   Regular Exercise no   Equipment Currently Used at Home none   Functional Level Prior   Ambulation 0-->independent   Transferring 0-->independent   Toileting 0-->independent   Bathing 0-->independent   Dressing 0-->independent   Fall history within last six months no   General Information   Onset of Illness/Injury or Date of Surgery - Date 07/25/18   Referring Physician Antonio Peck MD   Patient/Family Goals Statement Home   Additional Occupational Profile Info/Pertinent History of Current Problem Per chart: Although the flow reserve of the LAD stenosis was deemed not physiologically significant, patient gives a history of escalating angina and has not even been able to walk half a block over the last few weeks.  Overall an exercise nuclear stress test was done earlier today which was positive for ischemia and the apex (LAD territory).  Patient also had prolonged episode of exercise-induced ischemia that persisted into recovery, although there were no ECG changes.  Therefore, he will be undergoing stenting of his proximal LAD lesion later today.  s/p stent    Cognitive Status Examination   Orientation orientation to person, place and time   Level of Consciousness alert   Visual Perception   Visual Perception Wears glasses   Sensory Examination   Sensory Quick Adds No deficits were identified   Pain Assessment   Patient Currently in Pain No   Transfer Skills   Transfer Transfer Safety Analysis  "Bed/Chair;Transfer Skill: Stand to Sit;Transfer Safety Analysis Sit/Stand   Transfer Skill: Bed to Chair/Chair to Bed   Level of [place]: Bed to Chair independent   Transfer Skill: Sit to Stand   Level of [place]: Sit/Stand independent   Toilet Transfer   Toilet Transfer Toilet Transfer Skill;Toilet Transfer Safety Analysis   Transfer Skill: Toilet Transfer   Level of [place]: Toilet independent   Instrumental Activities of Daily Living (IADL)   Previous Responsibilities meal prep;housekeeping;laundry;shopping;yardwork;medication management;finances;driving;work  (Works full time )   General Therapy Interventions   Planned Therapy Interventions ADL retraining;IADL retraining;risk factor education;progressive activity/exercise;home program guidelines;transfer training;strengthening   Clinical Impression   Criteria for Skilled Therapeutic Interventions Met yes, treatment indicated   OT Diagnosis Decreased enduance for I/ADLs   Influenced by the following impairments Decreased enduance for I/ADLs   Assessment of Occupational Performance 1-3 Performance Deficits   Identified Performance Deficits Decreased enduance for I/ADLs (dressing, bathing, toileting)   Clinical Decision Making (Complexity) Low complexity   Therapy Frequency 2 times/day   Predicted Duration of Therapy Intervention (days/wks) 3 days   Anticipated Discharge Disposition Home with Outpatient Therapy  (OP CR)   Risks and Benefits of Treatment have been explained. Yes   Patient, Family & other staff in agreement with plan of care Yes   [facility] TM \"6 Clicks\"   2016, Trustees of [facility], under license to [employer].  All rights reserved.   6 Clicks Short Forms Daily Activity Inpatient Short Form   [facility]  \"6 Clicks\" Daily Activity Inpatient Short Form   1. Putting on and taking off regular lower body clothing? 4 - None   2. Bathing (including washing, rinsing, drying)? 4 - None   3. Toileting, " which includes using toilet, bedpan or urinal? 4 - None   4. Putting on and taking off regular upper body clothing? 4 - None   5. Taking care of personal grooming such as brushing teeth? 4 - None   6. Eating meals? 4 - None   Daily Activity Raw Score (Score out of 24.Lower scores equate to lower levels of function) 24   Total Evaluation Time   Total Evaluation Time (Minutes) 8

## 2018-07-28 NOTE — PROGRESS NOTES
[facility]    Cardiology Progress Note     Assessment & Plan   Darryl Uribe is a 58 year old male who was admitted on 7/25/2018.  Cardiology was consulted for recommendations regarding acute coronary syndrome.  He underwent PCI of the LAD on 7/27/2018    Active Problems:    ACS (acute coronary syndrome) (H)    Assessment: Status post PCI of the LAD yesterday    Plan: He is medically optimized for discharge today.  Access site looks good without signs of significant bruising or hematoma.  We will plan to dismiss on lisinopril 40 mg daily, metoprolol tartrate 25 mg twice daily, atorvastatin 40 mg daily, aspirin 81 mg, and ticagrelor 90 mg twice a day for 1 year.  His only new medicine is Imdur 30 mg daily.  He has a planned outpatient appointment scheduled with us for post hospital follow-up.    Marco Antonio Razo MD    Interval History   Mr. Uribe is doing well this morning.  He is chest pain-free.  He denies any shortness of breath or dyspnea on exertion.  He denies any orthopnea paroxysmal nocturnal dyspnea.    Physical Exam   Temp: 98.2  F (36.8  C) Temp src: Oral BP: 140/83 Pulse: 104 Heart Rate: 75 Resp: 18 SpO2: 95 % O2 Device: None (Room air)    Vitals:    07/26/18 0210 07/27/18 0537 07/28/18 0030   Weight: 120.4 kg (265 lb 6.4 oz) 120.5 kg (265 lb 9.6 oz) 120 kg (264 lb 9.6 oz)     Vital Signs with Ranges  Temp:  [98.2  F (36.8  C)-98.6  F (37  C)] 98.2  F (36.8  C)  Pulse:  [104] 104  Heart Rate:  [61-90] 75  Resp:  [14-29] 18  BP: (103-150)/() 140/83  SpO2:  [94 %-97 %] 95 %  I/O last 3 completed shifts:  In: 410 [P.O.:410]  Out: 250 [Urine:250]    Constitutional: No apparent distress.   Eyes: No xanthelasma or conjunctivitis  Respiratory: Clear to auscultation bilaterally. No crackles or wheezes.  Cardiovascular: Regular rate and rhythm. Normal S1 and S2. No murmurs. No extra heart sounds. No JVD.   Extremities: No peripheral edema.  Neurologic: Moving all extremities. No facial  assymmetry.  Psychiatric: Alert and oriented. Answers questions appropriately.     Medications     Continuing ACE inhibitor/ARB/ARNI from home medication list OR ACE inhibitor/ARB order already placed during this visit       - MEDICATION INSTRUCTIONS -       - MEDICATION INSTRUCTIONS -       - MEDICATION INSTRUCTIONS -       Percutaneous Coronary Intervention orders placed (this is information for BPA alerting)         aspirin  81 mg Oral Daily     atorvastatin  40 mg Oral Daily     iopamidol  80 mL INTRA-ARTERIAL Once     isosorbide mononitrate  30 mg Oral Daily     lisinopril  40 mg Oral At Bedtime     metoprolol tartrate  25 mg Oral BID     omeprazole  20 mg Oral BID     sodium chloride (PF)  3 mL Intracatheter Q8H     ticagrelor (BRILINTA) tablet 90 mg  90 mg Oral BID     venlafaxine  37.5 mg Oral BID       Data   Results for orders placed or performed during the hospital encounter of 07/25/18 (from the past 24 hour(s))   NM Exercise stress test (nuc card)    Narrative    GATED MYOCARDIAL PERFUSION SCINTIGRAPHY EXERCISE- ONE DAY STUDY     7/27/2018 1:03 PM  ROHITH DALY  58 years  Male  1960.    Indication/Clinical History: Chest pain    Impression  1.  Myocardial perfusion imaging using single isotope technique  demonstrated small size mild intensity apical ischemia.   2. Patient exercised 7 minutes 14 seconds achieving 10.1 METs. Patient  developed 5/10 chest pain at peak exercise that resolved 4 minutes  into recovery. There were no EKG changes suggestive of ischemia.  3. Gated images demonstrated no regional wall motion abnormalities.   The left ventricular systolic function is hyperdynamic with LVEF of  82% at rest.  4. No prior study for comparison.    Procedure  The patient performed treadmill exercise using a Que protocol,  completing 7 minutes and 14 seconds with an estimated workload of 10.1  METS.  The test was terminated due to shortness of breath. The heart  rate was 62 beats per minute at  baseline and increased to 144 beats at  peak exercise, which was 89% of the maximum predicted heart rate. The  rest blood pressure was 113/69 mm/Hg and peak blood pressure is  180/70mm/Hg with rate pressure product of 25,920. The patient  experienced 5/10 chest pain at peak exercise that resolved 4 minutes  into recovery during the test. The patient was on a beta blocker.    Myocardial perfusion imaging was performed at rest, approximately 45  minutes after the injection intravenously of 12of Tc-99m Myoview. At  peak exercise, the patient was injected intravenously with 35.2 mCi of   Tc-99m Myoview and exercise continued for approximately 1 minute.  Gated post-stress tomographic imaging was performed approximately 30  minutes after stress    EKG Findings  The resting EKG demonstrated sinus rhythm, T wave inversion in lead  III . The stress EKG demonstrated no significant ST-T changes compared  to baseline.    Tomographic Findings  Overall, the study quality is gated . On stress images there is small  size mild intensity apical wall photopenic defect. There is  essentially diffuse homogenous tracer uptake in rest of the myocardium  at rest and stress. Gated images demonstrated no regional wall motion  abnormalities. The left ventricular ejection fraction was calculated  to be 88% with stress and 82% at rest. TID was visually absent.    BOB DOSS MD   Basic metabolic panel   Result Value Ref Range    Sodium 139 133 - 144 mmol/L    Potassium 3.9 3.4 - 5.3 mmol/L    Chloride 106 94 - 109 mmol/L    Carbon Dioxide 25 20 - 32 mmol/L    Anion Gap 8 3 - 14 mmol/L    Glucose 92 70 - 99 mg/dL    Urea Nitrogen 14 7 - 30 mg/dL    Creatinine 0.87 0.66 - 1.25 mg/dL    GFR Estimate 90 >60 mL/min/1.7m2    GFR Estimate If Black >90 >60 mL/min/1.7m2    Calcium 8.8 8.5 - 10.1 mg/dL   CBC with platelets   Result Value Ref Range    WBC 8.4 4.0 - 11.0 10e9/L    RBC Count 4.03 (L) 4.4 - 5.9 10e12/L    Hemoglobin 12.0 (L) 13.3 - 17.7  g/dL    Hematocrit 35.1 (L) 40.0 - 53.0 %    MCV 87 78 - 100 fl    MCH 29.8 26.5 - 33.0 pg    MCHC 34.2 31.5 - 36.5 g/dL    RDW 12.7 10.0 - 15.0 %    Platelet Count 184 150 - 450 10e9/L   Lipid Profile   Result Value Ref Range    Cholesterol 128 <200 mg/dL    Triglycerides 223 (H) <150 mg/dL    HDL Cholesterol 40 >39 mg/dL    LDL Cholesterol Calculated 43 <100 mg/dL    Non HDL Cholesterol 88 <130 mg/dL   Activated clotting time POCT   Result Value Ref Range    Activated Clot Time 245 (H) 75 - 150 sec   Inpatient Coronary Angiography Adult Order    Narrative    PROCEDURES PERFORMED:   1-PCI the proximal LAD with ADELFO    INVASIVE/INTERVENTIONAL CARDIOLOGIST:  Antonio Peck MD., [facility], [facility]    CLINICAL DATA: 58-year-old gentleman with history of coronary disease  including previous right coronary stenting. Presents with worsening  angina and an unstable pattern. Angiography did not demonstrate about  a 70% proximal LAD lesion but the physiologic lesion assessment was  felt to be not significantly abnormal. However he was having  consistent anginal symptoms. Follow-up stress study today produced  anginal symptoms with exercise and there was anterior apical perfusion  abnormality on medical therapy. Therefore he was referred for LAD PCI    CATHETERIZATION RESULTS:     1   . Successful proximal LAD PCI. 3.5 x 20 mm synergy ADELFO placed.  Postdilated with 3.75 NC.   -0% residual stenosis, DEANNA-3 flow   -during balloon inflation and stent deployment patient developed the  same anginal type symptoms and chest pain he had been having at  presentation      2. PCI of the proximal LAD        Successful guiding catheter EBU 3.5. Lesion crossed with a BMW  guidewire. Patient was on Brillinta preceding procedure. Additional  heparin given keep ACT >250. Lesion predilated with a 2.5 mm balloon.  Then stented with a 3.520 mm synergy ADELFO. There is significant  residual mid incomplete dilatation. He was therefore postdilated with  3.75  noncompliant balloon to 14 prashant. There was then 0% residual  stenosis in DEANNA-3 flow.        PROCEDURE:     Patient was assessed immediately prior to the procedure and was  determined to be appropriate to proceed with the planned procedure and  associated conscious sedation.    -right radial access with a 6 [place]ijani slender glide sheath.  Anticoagulation with heparin.     -Successful guiding catheter was EBU 3.5. See 2. For PCI details.    Following review of results, the catheter was removed over guidewire,  the radial sheath was removed and hemostasis obtained with a TR band,  and the patient transferred back to the care unit.     -Total contrast was 80 cc Isovue-370.   -Fluoroscopy time was 6.2 minutes. Air Kerma: 0.810 Grey   -Estimated blood loss:  Less than 10 cc  -Conscious sedation was used throughout the procedure under my  supervision. There was continuous oximetry, hemodynamic, and patient  monitoring by the staff under my supervision. Total sedation time was   50 minutes.   4  milligrams of Versed and  100 micrograms of fentanyl  IV. No sedation complications.   -Patient stable on transfer back to the care unit    LM MADRID MD   EKG 12-lead, tracing only    Result Value Ref Range    Interpretation ECG Click View Image link to view waveform and result    Basic metabolic panel   Result Value Ref Range    Sodium 138 133 - 144 mmol/L    Potassium 3.9 3.4 - 5.3 mmol/L    Chloride 106 94 - 109 mmol/L    Carbon Dioxide 25 20 - 32 mmol/L    Anion Gap 7 3 - 14 mmol/L    Glucose 99 70 - 99 mg/dL    Urea Nitrogen 11 7 - 30 mg/dL    Creatinine 0.92 0.66 - 1.25 mg/dL    GFR Estimate 84 >60 mL/min/1.7m2    GFR Estimate If Black >90 >60 mL/min/1.7m2    Calcium 8.8 8.5 - 10.1 mg/dL   CBC with platelets   Result Value Ref Range    WBC 6.5 4.0 - 11.0 10e9/L    RBC Count 4.12 (L) 4.4 - 5.9 10e12/L    Hemoglobin 12.4 (L) 13.3 - 17.7 g/dL    Hematocrit 36.2 (L) 40.0 - 53.0 %    MCV 88 78 - 100 fl    MCH 30.1 26.5 - 33.0  pg    MCHC 34.3 31.5 - 36.5 g/dL    RDW 12.7 10.0 - 15.0 %    Platelet Count 170 150 - 450 10e9/L

## 2018-07-28 NOTE — PHARMACY
Medication coverage check for Brilinta. Patient needs a refill according to [facility], but just picked up this medication 7/10/18. The price was $24.99 for 1 month.  Mary Carmen Israel CphT  [facility] Discharge Pharmacy Liaison  Liason Cell: [phone]

## 2018-07-30 ENCOUNTER — TELEPHONE (OUTPATIENT)
Dept: CARDIOLOGY | Facility: CLINIC | Age: 58
End: 2018-07-30

## 2018-07-30 NOTE — TELEPHONE ENCOUNTER
"Pt returned my phone call and denies any chest pain, SOB or lightheadedness. C/O persistent 7-8/10 HA since being started on Imdur 30 mg po daily, unresolved with Ibuprofen 600 mg po every 6 hrs, \"It's just been pounding ever since I started it in the hospital. I am having a hard time even looking at the computer for work because of the HA.\" Encouraged pt to try ES Tylenol 100 mg po every 6 hrs PRN, and writer will message Dr. Jennings with pt's complaints. RRA and groin sites without signs of infection or hematoma. Reminded pt of f/u OV scheduled on 8/6/18 with cardiology [employer] [facility], with labs prior. Reminded pt to schedule cardiac rehab-their scheduling phone number was provided. Instructed pt to call back with any cardiac related questions or concerns and will call pt back with Dr. Jennings's recommendations. NEY Arroyo RN.  "

## 2018-07-30 NOTE — TELEPHONE ENCOUNTER
Patient was evaluated by cardiology while inpatient for escalating angina and has not even been able to walk half a block over the last few weeks. 7/25/18 PCI with ADELFO placed LAD via RRA. Called patient to discuss any post hospital d/c questions he may have, review medication changes, and confirm f/u appts, but no answer. VM left to return my phone call, so RN can confirm with patient that he was d/c with antiplatelet Brilinta, to inquire about any symptoms and to confirm f/u gurpreet'ts scheduled on 8/6/18 for cardiology [facility]URPREET F/U OV with Natalie Lewis, with labs prior. Cardiac rehab needs to be scheduled. NEY Arroyo RN.

## 2018-07-31 LAB — INTERPRETATION ECG - MUSE: NORMAL

## 2018-08-01 NOTE — TELEPHONE ENCOUNTER
Attempted to call pt back to inquire about HA, but no answer. VM left to return my phone message. Will route this message to Natalie Lewis for recommendations. NEY Arroyo RN.

## 2018-08-02 NOTE — TELEPHONE ENCOUNTER
Writer called pt back and he still c/o HA rates as 4-5/10, and only sl relieved with regular doses of Tylenol or Ibuprofen. Instructed to discontinue Imdur as per recommendations and he verbalized appreciation. Will call back with any further questions or concerns. NEY Arroyo RN.

## 2018-08-02 NOTE — TELEPHONE ENCOUNTER
Response by Natalie Lewis as below:        We can stop imdur.     Thanks,     Natalie      Will attempt to call pt back again today to update him. NEY Arroyo RN.

## 2018-08-06 ENCOUNTER — OFFICE VISIT (OUTPATIENT)
Dept: CARDIOLOGY | Facility: CLINIC | Age: 58
End: 2018-08-06
Attending: PHYSICIAN ASSISTANT
Payer: COMMERCIAL

## 2018-08-06 VITALS
HEART RATE: 62 BPM | WEIGHT: 262.2 LBS | BODY MASS INDEX: 41.15 KG/M2 | SYSTOLIC BLOOD PRESSURE: 136 MMHG | HEIGHT: 67 IN | DIASTOLIC BLOOD PRESSURE: 68 MMHG

## 2018-08-06 DIAGNOSIS — I10 BENIGN ESSENTIAL HYPERTENSION: ICD-10-CM

## 2018-08-06 DIAGNOSIS — I24.9 ACS (ACUTE CORONARY SYNDROME) (H): ICD-10-CM

## 2018-08-06 DIAGNOSIS — E78.2 MIXED HYPERLIPIDEMIA: ICD-10-CM

## 2018-08-06 DIAGNOSIS — I20.0 UNSTABLE ANGINA (H): ICD-10-CM

## 2018-08-06 DIAGNOSIS — I25.10 CORONARY ARTERY DISEASE INVOLVING NATIVE CORONARY ARTERY OF NATIVE HEART WITHOUT ANGINA PECTORIS: Primary | ICD-10-CM

## 2018-08-06 LAB
ANION GAP SERPL CALCULATED.3IONS-SCNC: 13.6 MMOL/L (ref 6–17)
BUN SERPL-MCNC: 13 MG/DL (ref 7–30)
CALCIUM SERPL-MCNC: 9.4 MG/DL (ref 8.5–10.5)
CHLORIDE SERPL-SCNC: 105 MMOL/L (ref 98–107)
CO2 SERPL-SCNC: 24 MMOL/L (ref 23–29)
CREAT SERPL-MCNC: 1.09 MG/DL (ref 0.7–1.3)
ERYTHROCYTE [DISTWIDTH] IN BLOOD BY AUTOMATED COUNT: 12.7 % (ref 10–15)
GFR SERPL CREATININE-BSD FRML MDRD: 69 ML/MIN/1.7M2
GLUCOSE SERPL-MCNC: 164 MG/DL (ref 70–105)
HCT VFR BLD AUTO: 38.8 % (ref 40–53)
HGB BLD-MCNC: 13.2 G/DL (ref 13.3–17.7)
MCH RBC QN AUTO: 29.9 PG (ref 26.5–33)
MCHC RBC AUTO-ENTMCNC: 34 G/DL (ref 31.5–36.5)
MCV RBC AUTO: 88 FL (ref 78–100)
PLATELET # BLD AUTO: 223 10E9/L (ref 150–450)
POTASSIUM SERPL-SCNC: 3.6 MMOL/L (ref 3.5–5.1)
RBC # BLD AUTO: 4.42 10E12/L (ref 4.4–5.9)
SODIUM SERPL-SCNC: 139 MMOL/L (ref 136–145)
WBC # BLD AUTO: 5.4 10E9/L (ref 4–11)

## 2018-08-06 PROCEDURE — 36415 COLL VENOUS BLD VENIPUNCTURE: CPT | Performed by: INTERNAL MEDICINE

## 2018-08-06 PROCEDURE — 93000 ELECTROCARDIOGRAM COMPLETE: CPT | Performed by: PHYSICIAN ASSISTANT

## 2018-08-06 PROCEDURE — 99213 OFFICE O/P EST LOW 20 MIN: CPT | Mod: 25 | Performed by: PHYSICIAN ASSISTANT

## 2018-08-06 PROCEDURE — 80048 BASIC METABOLIC PNL TOTAL CA: CPT | Performed by: INTERNAL MEDICINE

## 2018-08-06 PROCEDURE — 85027 COMPLETE CBC AUTOMATED: CPT | Performed by: INTERNAL MEDICINE

## 2018-08-06 NOTE — LETTER
8/6/2018    Oseas Correa MD  [facility] [address]  [facility] [id number]    RE: Darryl Uribe       Dear Colleague,    I had the pleasure of seeing Darryl Uribe in the [facility].    Primary Cardiologist: Dr. Amaya    History of Present Illness:   This a pleasant 58 year old male who presents to cardiology clinic for post hospital follow up. His past medical history is notable for CAD and hypertension. He has history of RCA stenting in 2017. He presented to the ED on 7/245/2018 with progressive chest discomfort and received stenting to the LAD with complete resolution of his symptoms. He was initiated on Brilinta and ASA for antiplatelet therapy. He is on BB and Acei. He was initiated on Atorvastatin as well.     He returns to clinic stating that he is doing much better and has more energy since his LAD stenting. He denies any bleeding issues. He stopped his imdur due to severe headaches which are resolved now. He has not attended cardiac rehab and is not interested in this. He has no new concerns.     Assessment and Plan:   This is a pleasant 58 year old male who presents to cardiology clinic for post hospital follow up. He was admitted recently and received stenting to the LAD with complete resolution of his symptoms. He is on appropriate medications. We will continue without imdur. I recommended he enrolls in cardiac rehab. He will think about this. He will continue with Brilinta and ASA. We will add lipid/ALT prior to his visit with Dr. Amaya.     Thank you for allowing me to participate in the care of this patient today.    This note was completed in part using Dragon voice recognition software. Although reviewed after completion, some word and grammatical errors may occur.    Orders this Visit:  Orders Placed This Encounter   Procedures     EKG 12-lead complete w/read - Clinics (performed today)     No orders of the defined types were placed in  this encounter.    There are no discontinued medications.      Encounter Diagnoses   Name Primary?     Unstable angina (H)      Coronary artery disease involving native coronary artery of native heart without angina pectoris Yes     ACS (acute coronary syndrome) (H)      Benign essential hypertension      Mixed hyperlipidemia      NSTEMI (non-ST elevated myocardial infarction) (H)        CURRENT MEDICATIONS:  Current Outpatient Prescriptions   Medication Sig Dispense Refill     ASPIRIN PO Take 81 mg by mouth every morning        atorvastatin (LIPITOR) 40 MG tablet Take 1 tablet (40 mg) by mouth daily 30 tablet 0     lisinopril (PRINIVIL/ZESTRIL) 40 MG tablet Take 1 tablet (40 mg) by mouth At Bedtime 30 tablet 0     metoprolol (LOPRESSOR) 25 MG tablet Take 1 tablet (25 mg) by mouth 2 times daily 60 tablet 0     nitroGLYcerin (NITROSTAT) 0.4 MG sublingual tablet For chest pain place 1 tablet under the tongue every 5 minutes for 3 doses. If symptoms persist 5 minutes after 1st dose call 911. 25 tablet 1     omeprazole (PRILOSEC) 20 MG CR capsule Take 20 mg by mouth 2 times daily       ticagrelor (BRILINTA) 90 MG tablet Take 1 tablet (90 mg) by mouth 2 times daily Filled on 7/10/18 for 30 days supply (60 tabs), no refills. 60 tablet 5     venlafaxine (EFFEXOR) 37.5 MG tablet Take 37.5 mg by mouth 2 times daily       VITAMIN D, CHOLECALCIFEROL, PO Take 400 Units by mouth daily         ALLERGIES   No Known Allergies    PAST MEDICAL HISTORY:  Past Medical History:   Diagnosis Date     Anxiety      CAD (coronary artery disease)     cardiac cath 7/2017: ADELFO to LAD; NonSTEMI + cath with ADELFO to RCA 6/2017     Hyperlipemia      Hypertension      Ulcerative colitis (H)        PAST SURGICAL HISTORY:  Past Surgical History:   Procedure Laterality Date     OTHER SURGICAL HISTORY  06/15/2017    cardiac cath: ADELFO to RCA     OTHER SURGICAL HISTORY  07/27/2018    cardiac cath: ADELFO to LAD       FAMILY HISTORY:  Family History   Problem  "Relation Age of Onset     Myocardial Infarction Mother        SOCIAL HISTORY:  Social History     Social History     Marital status:      Spouse name: N/A     Number of children: N/A     Years of education: N/A     Social History Main Topics     Smoking status: Never Smoker     Smokeless tobacco: Never Used     Alcohol use No      Comment: 2 drinks per year     Drug use: No     Sexual activity: No     Other Topics Concern     Parent/Sibling W/ Cabg, Mi Or Angioplasty Before 65f 55m? Yes     Social History Narrative       Review of Systems:  Skin:  Negative     Eyes:  Positive for glasses  ENT:  Negative    Respiratory:  Positive for sleep apnea;CPAP  Cardiovascular:  Negative    Gastroenterology: Negative    Genitourinary:  Negative    Musculoskeletal:  Positive for back pain  Neurologic:  Positive for numbness or tingling of feet  Psychiatric:  Negative    Heme/Lymph/Imm:  Negative    Endocrine:  Negative      Physical Exam:  Vitals: /68  Pulse 62  Ht 1.702 m (5' 7\")  Wt 118.9 kg (262 lb 3.2 oz)  BMI 41.07 kg/m2     GEN:  NAD  NECK: No JVD  C/V:  Regular rate and rhythm, no murmur, rub or gallop.  RESP: Clear to auscultation bilaterally without wheezing, rales, or rhonchi.  GI: Abdomen soft, nontender, nondistended.   EXTREM: No LE edema.   NEURO: Alert and oriented, cooperative. No obvious focal deficits.   PSYCH: Normal affect.  SKIN: Warm and dry.       Recent Lab Results:  LIPID RESULTS:  Lab Results   Component Value Date    CHOL 128 07/27/2018    HDL 40 07/27/2018    LDL 43 07/27/2018    TRIG 223 (H) 07/27/2018    CHOLHDLRATIO 2.6 08/11/2009       LIVER ENZYME RESULTS:  Lab Results   Component Value Date    AST 27 10/17/2017    ALT 35 10/17/2017       CBC RESULTS:  Lab Results   Component Value Date    WBC 5.4 08/06/2018    RBC 4.42 08/06/2018    HGB 13.2 (L) 08/06/2018    HCT 38.8 (L) 08/06/2018    MCV 88 08/06/2018    MCH 29.9 08/06/2018    MCHC 34.0 08/06/2018    RDW 12.7 08/06/2018    PLT " 223 08/06/2018       BMP RESULTS:  Lab Results   Component Value Date     08/06/2018    POTASSIUM 3.6 08/06/2018    CHLORIDE 105 08/06/2018    CO2 24 08/06/2018    ANIONGAP 13.6 08/06/2018     (H) 08/06/2018    BUN 13 08/06/2018    CR 1.09 08/06/2018    GFRESTIMATED 69 08/06/2018    GFRESTBLACK 84 08/06/2018    DYANA 9.4 08/06/2018        A1C RESULTS:  Lab Results   Component Value Date    A1C 5.8 06/14/2017       INR RESULTS:  Lab Results   Component Value Date    INR 1.09 07/25/2018    INR Canceled, Test credited 07/25/2018         Thank you for allowing me to participate in the care of your patient.    Sincerely,     Natalie Ballesteros PA-C     [facility]

## 2018-08-06 NOTE — MR AVS SNAPSHOT
After Visit Summary   8/6/2018    Darryl Uribe    MRN: [id number]           Patient Information     Date Of Birth          1960        Visit Information        Provider Department      8/6/2018 3:10 PM Natalie Ballesteros PA-C [facility]        Today's Diagnoses     Coronary artery disease involving native coronary artery of native heart without angina pectoris    -  1    Benign essential hypertension        Mixed hyperlipidemia          Care Instructions    Today's Plan:   1) Continue with current medications.   2) Stop by scheduling to add fasting labs prior to your visit with Dr. Amaya.     If you have questions or concerns please call my nurse team at [phone].     Scheduling phone number: [phone]  Reminder: Please bring in all current medications, over the counter supplements and vitamin bottles to your next appointment.    It was a pleasure seeing you today!     Natalie Ballesteros  8/6/2018              Follow-ups after your visit        Your next 10 appointments already scheduled     Oct 22, 2018 11:15 AM CDT   New Visit with Landon Amaya MD   [facility] ([facility])    [address] [place] [id number] [phone] OPT 2              Future tests that were ordered for you today     Open Future Orders        Priority Expected Expires Ordered    Lipid Profile Routine 10/19/2018 8/6/2019 8/6/2018    ALT Routine 10/19/2018 8/6/2019 8/6/2018            Who to contact     If you have questions or need follow up information about today's clinic visit or your schedule please contact [facility] directly at [phone].  Normal or non-critical lab and imaging results will be communicated to you by MyChart, letter or phone within 4 business days after the clinic has received the results. If you do not hear from us within 7 days,  "please contact the clinic through [employer] or phone. If you have a critical or abnormal lab result, we will notify you by phone as soon as possible.  Submit refill requests through [employer] or call your pharmacy and they will forward the refill request to us. Please allow 3 business days for your refill to be completed.          Additional Information About Your Visit        [email or web address] Information     [employer] gives you secure access to your electronic health record. If you see a primary care provider, you can also send messages to your care team and make appointments. If you have questions, please call your primary care clinic.  If you do not have a primary care provider, please call [phone] and they will assist you.        Care EveryWhere ID     This is your Care EveryWhere ID. This could be used by other organizations to access your [facility] medical records  [id number]        Your Vitals Were     Pulse Height BMI (Body Mass Index)             62 1.702 m (5' 7\") 41.07 kg/m2          Blood Pressure from Last 3 Encounters:   08/06/18 136/68   07/28/18 120/83   07/23/18 122/64    Weight from Last 3 Encounters:   08/06/18 118.9 kg (262 lb 3.2 oz)   07/28/18 120 kg (264 lb 9.6 oz)   07/23/18 120.7 kg (266 lb 1.6 oz)              We Performed the Following     EKG 12-lead complete w/read - Clinics (performed today)     Follow-Up with Cardiac Advanced Practice Provider          Where to get your medicines      [facility] [place] [place] - [address] AT Portal to [facility]  [address], [facility] [address]     Phone:  [phone]     ticagrelor 90 MG tablet          Primary Care Provider Office Phone # Fax #    Oseas Correa [phone]       [facility] [address]  [facility] [address]        Equal Access to Services     GARRETT SOSA AH: franco Webster qaybta " ting grimesarturo mcgee ah. So [facility] [phone].    ATENCIÓN: Si adama wilkinson, tiene a kincaid disposición servicios gratuitos de asistencia lingüística. Aldo al [phone].    We comply with applicable federal civil rights laws and [place] laws. We do not discriminate on the basis of race, color, national origin, age, disability, sex, sexual orientation, or gender identity.            Thank you!     Thank you for choosing [facility]  for your care. Our goal is always to provide you with excellent care. Hearing back from our patients is one way we can continue to improve our services. Please take a few minutes to complete the written survey that you may receive in the mail after your visit with us. Thank you!             Your Updated Medication List - Protect others around you: Learn how to safely use, store and throw away your medicines at www.disposemymeds.org.          This list is accurate as of 8/6/18  3:37 PM.  Always use your most recent med list.                   Brand Name Dispense Instructions for use Diagnosis    ASPIRIN PO      Take 81 mg by mouth every morning        atorvastatin 40 MG tablet    LIPITOR    30 tablet    Take 1 tablet (40 mg) by mouth daily    NSTEMI (non-ST elevated myocardial infarction) (H)       lisinopril 40 MG tablet    PRINIVIL/ZESTRIL    30 tablet    Take 1 tablet (40 mg) by mouth At Bedtime    NSTEMI (non-ST elevated myocardial infarction) (H)       metoprolol tartrate 25 MG tablet    LOPRESSOR    60 tablet    Take 1 tablet (25 mg) by mouth 2 times daily    NSTEMI (non-ST elevated myocardial infarction) (H)       nitroGLYcerin 0.4 MG sublingual tablet    NITROSTAT    25 tablet    For chest pain place 1 tablet under the tongue every 5 minutes for 3 doses. If symptoms persist 5 minutes after 1st dose call 911.    Postsurgical percutaneous transluminal coronary angioplasty status, NSTEMI (non-ST elevated  myocardial infarction) (H), Mixed hyperlipidemia       omeprazole 20 MG CR capsule    priLOSEC     Take 20 mg by mouth 2 times daily        ticagrelor 90 MG tablet    BRILINTA    180 tablet    Take 1 tablet (90 mg) by mouth 2 times daily Filled on 7/10/18 for 30 days supply (60 tabs), no refills.        venlafaxine 37.5 MG tablet    EFFEXOR     Take 37.5 mg by mouth 2 times daily        VITAMIN D (CHOLECALCIFEROL) PO      Take 400 Units by mouth daily

## 2018-08-06 NOTE — PROGRESS NOTES
Primary Cardiologist: Dr. Amaya    History of Present Illness:   This a pleasant 58 year old male who presents to cardiology clinic for post hospital follow up. His past medical history is notable for CAD and hypertension. He has history of RCA stenting in 2017. He presented to the ED on 7/245/2018 with progressive chest discomfort and received stenting to the LAD with complete resolution of his symptoms. He was initiated on Brilinta and ASA for antiplatelet therapy. He is on BB and Acei. He was initiated on Atorvastatin as well.     He returns to clinic stating that he is doing much better and has more energy since his LAD stenting. He denies any bleeding issues. He stopped his imdur due to severe headaches which are resolved now. He has not attended cardiac rehab and is not interested in this. He has no new concerns.     Assessment and Plan:   This is a pleasant 58 year old male who presents to cardiology clinic for post hospital follow up. He was admitted recently and received stenting to the LAD with complete resolution of his symptoms. He is on appropriate medications. We will continue without imdur. I recommended he enrolls in cardiac rehab. He will think about this. He will continue with Brilinta and ASA. We will add lipid/ALT prior to his visit with Dr. Amaya.     Thank you for allowing me to participate in the care of this patient today.    This note was completed in part using Dragon voice recognition software. Although reviewed after completion, some word and grammatical errors may occur.    Orders this Visit:  Orders Placed This Encounter   Procedures     EKG 12-lead complete w/read - Clinics (performed today)     No orders of the defined types were placed in this encounter.    There are no discontinued medications.      Encounter Diagnoses   Name Primary?     Unstable angina (H)      Coronary artery disease involving native coronary artery of native heart without angina pectoris Yes     ACS (acute  coronary syndrome) (H)      Benign essential hypertension      Mixed hyperlipidemia      NSTEMI (non-ST elevated myocardial infarction) (H)        CURRENT MEDICATIONS:  Current Outpatient Prescriptions   Medication Sig Dispense Refill     ASPIRIN PO Take 81 mg by mouth every morning        atorvastatin (LIPITOR) 40 MG tablet Take 1 tablet (40 mg) by mouth daily 30 tablet 0     lisinopril (PRINIVIL/ZESTRIL) 40 MG tablet Take 1 tablet (40 mg) by mouth At Bedtime 30 tablet 0     metoprolol (LOPRESSOR) 25 MG tablet Take 1 tablet (25 mg) by mouth 2 times daily 60 tablet 0     nitroGLYcerin (NITROSTAT) 0.4 MG sublingual tablet For chest pain place 1 tablet under the tongue every 5 minutes for 3 doses. If symptoms persist 5 minutes after 1st dose call 911. 25 tablet 1     omeprazole (PRILOSEC) 20 MG CR capsule Take 20 mg by mouth 2 times daily       ticagrelor (BRILINTA) 90 MG tablet Take 1 tablet (90 mg) by mouth 2 times daily Filled on 7/10/18 for 30 days supply (60 tabs), no refills. 60 tablet 5     venlafaxine (EFFEXOR) 37.5 MG tablet Take 37.5 mg by mouth 2 times daily       VITAMIN D, CHOLECALCIFEROL, PO Take 400 Units by mouth daily         ALLERGIES   No Known Allergies    PAST MEDICAL HISTORY:  Past Medical History:   Diagnosis Date     Anxiety      CAD (coronary artery disease)     cardiac cath 7/2017: ADELFO to LAD; NonSTEMI + cath with ADELFO to RCA 6/2017     Hyperlipemia      Hypertension      Ulcerative colitis (H)        PAST SURGICAL HISTORY:  Past Surgical History:   Procedure Laterality Date     OTHER SURGICAL HISTORY  06/15/2017    cardiac cath: ADELFO to RCA     OTHER SURGICAL HISTORY  07/27/2018    cardiac cath: ADELFO to LAD       FAMILY HISTORY:  Family History   Problem Relation Age of Onset     Myocardial Infarction Mother        SOCIAL HISTORY:  Social History     Social History     Marital status:      Spouse name: N/A     Number of children: N/A     Years of education: N/A     Social History Main  "Topics     Smoking status: Never Smoker     Smokeless tobacco: Never Used     Alcohol use No      Comment: 2 drinks per year     Drug use: No     Sexual activity: No     Other Topics Concern     Parent/Sibling W/ Cabg, Mi Or Angioplasty Before 65f 55m? Yes     Social History Narrative       Review of Systems:  Skin:  Negative     Eyes:  Positive for glasses  ENT:  Negative    Respiratory:  Positive for sleep apnea;CPAP  Cardiovascular:  Negative    Gastroenterology: Negative    Genitourinary:  Negative    Musculoskeletal:  Positive for back pain  Neurologic:  Positive for numbness or tingling of feet  Psychiatric:  Negative    Heme/Lymph/Imm:  Negative    Endocrine:  Negative      Physical Exam:  Vitals: /68  Pulse 62  Ht 1.702 m (5' 7\")  Wt 118.9 kg (262 lb 3.2 oz)  BMI 41.07 kg/m2     GEN:  NAD  NECK: No JVD  C/V:  Regular rate and rhythm, no murmur, rub or gallop.  RESP: Clear to auscultation bilaterally without wheezing, rales, or rhonchi.  GI: Abdomen soft, nontender, nondistended.   EXTREM: No LE edema.   NEURO: Alert and oriented, cooperative. No obvious focal deficits.   PSYCH: Normal affect.  SKIN: Warm and dry.       Recent Lab Results:  LIPID RESULTS:  Lab Results   Component Value Date    CHOL 128 07/27/2018    HDL 40 07/27/2018    LDL 43 07/27/2018    TRIG 223 (H) 07/27/2018    CHOLHDLRATIO 2.6 08/11/2009       LIVER ENZYME RESULTS:  Lab Results   Component Value Date    AST 27 10/17/2017    ALT 35 10/17/2017       CBC RESULTS:  Lab Results   Component Value Date    WBC 5.4 08/06/2018    RBC 4.42 08/06/2018    HGB 13.2 (L) 08/06/2018    HCT 38.8 (L) 08/06/2018    MCV 88 08/06/2018    MCH 29.9 08/06/2018    MCHC 34.0 08/06/2018    RDW 12.7 08/06/2018     08/06/2018       BMP RESULTS:  Lab Results   Component Value Date     08/06/2018    POTASSIUM 3.6 08/06/2018    CHLORIDE 105 08/06/2018    CO2 24 08/06/2018    ANIONGAP 13.6 08/06/2018     (H) 08/06/2018    BUN 13 08/06/2018 "    CR 1.09 08/06/2018    GFRESTIMATED 69 08/06/2018    GFRESTBLACK 84 08/06/2018    DYANA 9.4 08/06/2018        A1C RESULTS:  Lab Results   Component Value Date    A1C 5.8 06/14/2017       INR RESULTS:  Lab Results   Component Value Date    INR 1.09 07/25/2018    INR Canceled, Test credited 07/25/2018           Natalie Ballesteros PA-C   August 6, 2018

## 2018-08-06 NOTE — PATIENT INSTRUCTIONS
Today's Plan:   1) Continue with current medications.   2) Stop by scheduling to add fasting labs prior to your visit with Dr. Amaya.     If you have questions or concerns please call my nurse team at [phone].     Scheduling phone number: [phone]  Reminder: Please bring in all current medications, over the counter supplements and vitamin bottles to your next appointment.    It was a pleasure seeing you today!     Natalie Ballesteros  8/6/2018

## 2018-08-07 DIAGNOSIS — Z79.02 ANTIPLATELET OR ANTITHROMBOTIC LONG-TERM USE: Primary | ICD-10-CM

## 2018-10-02 DIAGNOSIS — Z79.02 ANTIPLATELET OR ANTITHROMBOTIC LONG-TERM USE: ICD-10-CM

## 2018-10-02 DIAGNOSIS — I21.4 NSTEMI (NON-ST ELEVATED MYOCARDIAL INFARCTION) (H): ICD-10-CM

## 2018-10-02 RX ORDER — METOPROLOL TARTRATE 25 MG/1
25 TABLET, FILM COATED ORAL 2 TIMES DAILY
Qty: 90 TABLET | Refills: 3 | Status: SHIPPED | OUTPATIENT
Start: 2018-10-02 | End: 2018-11-08

## 2018-10-12 ENCOUNTER — PRE VISIT (OUTPATIENT)
Dept: CARDIOLOGY | Facility: CLINIC | Age: 58
End: 2018-10-12

## 2018-10-22 ENCOUNTER — OFFICE VISIT (OUTPATIENT)
Dept: CARDIOLOGY | Facility: CLINIC | Age: 58
End: 2018-10-22
Payer: COMMERCIAL

## 2018-10-22 VITALS
DIASTOLIC BLOOD PRESSURE: 81 MMHG | BODY MASS INDEX: 42.61 KG/M2 | SYSTOLIC BLOOD PRESSURE: 126 MMHG | HEART RATE: 63 BPM | WEIGHT: 271.5 LBS | HEIGHT: 67 IN

## 2018-10-22 DIAGNOSIS — I10 BENIGN ESSENTIAL HYPERTENSION: Primary | ICD-10-CM

## 2018-10-22 DIAGNOSIS — E66.01 MORBID OBESITY (H): ICD-10-CM

## 2018-10-22 DIAGNOSIS — I21.4 NSTEMI (NON-ST ELEVATED MYOCARDIAL INFARCTION) (H): ICD-10-CM

## 2018-10-22 DIAGNOSIS — E78.2 MIXED HYPERLIPIDEMIA: ICD-10-CM

## 2018-10-22 DIAGNOSIS — I25.10 CORONARY ARTERY DISEASE INVOLVING NATIVE CORONARY ARTERY OF NATIVE HEART WITHOUT ANGINA PECTORIS: ICD-10-CM

## 2018-10-22 DIAGNOSIS — I10 BENIGN ESSENTIAL HYPERTENSION: ICD-10-CM

## 2018-10-22 DIAGNOSIS — I24.9 ACS (ACUTE CORONARY SYNDROME) (H): ICD-10-CM

## 2018-10-22 LAB
ALT SERPL W P-5'-P-CCNC: 8 U/L (ref 5–30)
CHOLEST SERPL-MCNC: 148 MG/DL
HDLC SERPL-MCNC: 45 MG/DL
LDLC SERPL CALC-MCNC: 63 MG/DL
NONHDLC SERPL-MCNC: 103 MG/DL
TRIGL SERPL-MCNC: 202 MG/DL

## 2018-10-22 PROCEDURE — 84460 ALANINE AMINO (ALT) (SGPT): CPT | Performed by: PHYSICIAN ASSISTANT

## 2018-10-22 PROCEDURE — 99214 OFFICE O/P EST MOD 30 MIN: CPT | Performed by: INTERNAL MEDICINE

## 2018-10-22 PROCEDURE — 36415 COLL VENOUS BLD VENIPUNCTURE: CPT | Performed by: PHYSICIAN ASSISTANT

## 2018-10-22 PROCEDURE — 80061 LIPID PANEL: CPT | Performed by: PHYSICIAN ASSISTANT

## 2018-10-22 NOTE — PROGRESS NOTES
Service Date: 10/22/2018      HISTORY OF PRESENT ILLNESS:  Mr. Uribe is a very nice 58-year-old gentleman who is switching his care to me as Dr. Tirado is retiring.  His past medical history is significant for hypertension, hypercholesterolemia, a very strong family history of coronary artery disease with both his mother and father having heart attacks in their 60s.  He is a lifelong nonsmoker.  He unfortunately has morbid obesity and known coronary disease, having received a stent in his right coronary artery in 2016 after a non-ST segment elevation myocardial infarction and another stent in his left anterior descending artery in 08/2018, again, for an acute coronary syndrome.      Darryl returns to clinic stating he thinks he has done quite well since the stent.  He has had no chest, arm, neck, jaw or shoulder discomfort.  No dyspnea on exertion, orthopnea or PND.  No palpitations, lightheadedness, dizziness, syncope or near-syncope.  He states he thinks he gets short of breath when he walks up a hill, but thinks that is mostly conditioning as unfortunately he has continued to gain weight.      He also states that he stopped exercising after his first heart attack.  He states he is anxious and fearful of walking and has done less exercise since his heart attack instead of more.  He also states he has problems with portion control and loves carbohydrates.  He does not drink alcohol, pop or juice but does have problems with high triglycerides.  He is not diabetes does not run in the family, although review the chart does show 1 markedly elevated glucose in August that may have not been fasting and may have been around the time of his acute coronary syndrome.      Review of the chart shows also that he has intolerance to Imdur which causes severe headaches.        ASSESSMENT AND PLAN:  A 58-year-old gentleman with known coronary artery disease who appears to be doing well without symptoms to suggest ischemia, heart  failure or significant arrhythmia.      Blood pressure is very well controlled at 126/81 with a pulse of 63.      Weight unfortunately is 271 pounds, which is up 9 pounds from August.  His body mass index is now 42.6.  We talked about the importance of a low-carbohydrate diet, portion control, some sort of regular exercise and weight loss.  He states he thinks he is going to be able to do this and will focus on portions, carbohydrates and trying to get some exercise.  Follow up with an RIVERA in 4 months.  If he is not better, I would consider getting him into the WEL Program to see if he can overcome his anxiety about exercising.      Fasting lipid profile despite the weight gain and inactivity is better in most aspects except for the HDL which is going down.  Total cholesterol is 128, down from 157.  HDL is 40, unfortunately, down from 55.  LDL is excellent at 43, down from 65.  Triglycerides are unfortunately 223.  Again, we talked quite a bit about the importance of a low-carbohydrate diet, regular exercise regimen and weight loss as a way to drive HDL up and triglycerides down.      I think his dyspnea on exertion walking up hills is just his weight and deconditioning.  I do not think it is related to Brilinta or beta blockers.  Down the road, I would switch his beta blocker from metoprolol tartrate to metoprolol succinate to make this more convenient.      The last evaluation of his ejection fraction was a nuclear stress test in 07/2018 with an ejection fraction of 82%.  He was able to go 7 minutes and 14 seconds on a treadmill.        I will plan on seeing him back in 9 months, at which time I will recheck his fasting lipid profile and at that time we will consider stopping his Brilinta.  If he should have any problems, I would add to see him sooner.         JCARLOS CUELLAR MD, FACC             D: 10/22/2018   T: 10/22/2018   MT: SOLANGE      Name:     ROHITH DALY   MRN:      [id number]        Account:       [id number]   :      1960           Service Date: 10/22/2018      Document: [id number]

## 2018-10-22 NOTE — LETTER
10/22/2018      Oseas Correa MD  [facility] [address]  [facility] [address]      RE: Darryl Uribe       Dear Colleague,    I had the pleasure of seeing Darryl Uribe in the [facility].    Service Date: 10/22/2018      HISTORY OF PRESENT ILLNESS:  Mr. Uribe is a very nice 58-year-old gentleman who is switching his care to me as Dr. Tirado is retiring.  His past medical history is significant for hypertension, hypercholesterolemia, a very strong family history of coronary artery disease with both his mother and father having heart attacks in their 60s.  He is a lifelong nonsmoker.  He unfortunately has morbid obesity and known coronary disease, having received a stent in his right coronary artery in 2016 after a non-ST segment elevation myocardial infarction and another stent in his left anterior descending artery in 08/2018, again, for an acute coronary syndrome.      Darryl returns to clinic stating he thinks he has done quite well since the stent.  He has had no chest, arm, neck, jaw or shoulder discomfort.  No dyspnea on exertion, orthopnea or PND.  No palpitations, lightheadedness, dizziness, syncope or near-syncope.  He states he thinks he gets short of breath when he walks up a hill, but thinks that is mostly conditioning as unfortunately he has continued to gain weight.      He also states that he stopped exercising after his first heart attack.  He states he is anxious and fearful of walking and has done less exercise since his heart attack instead of more.  He also states he has problems with portion control and loves carbohydrates.  He does not drink alcohol, pop or juice but does have problems with high triglycerides.  He is not diabetes does not run in the family, although review the chart does show 1 markedly elevated glucose in August that may have not been fasting and may have been around the time of his acute coronary syndrome.      Review of  the chart shows also that he has intolerance to Imdur which causes severe headaches.        ASSESSMENT AND PLAN:  A 58-year-old gentleman with known coronary artery disease who appears to be doing well without symptoms to suggest ischemia, heart failure or significant arrhythmia.      Blood pressure is very well controlled at 126/81 with a pulse of 63.      Weight unfortunately is 271 pounds, which is up 9 pounds from August.  His body mass index is now 42.6.  We talked about the importance of a low-carbohydrate diet, portion control, some sort of regular exercise and weight loss.  He states he thinks he is going to be able to do this and will focus on portions, carbohydrates and trying to get some exercise.  Follow up with an RIVERA in 4 months.  If he is not better, I would consider getting him into the WEL Program to see if he can overcome his anxiety about exercising.      Fasting lipid profile despite the weight gain and inactivity is better in most aspects except for the HDL which is going down.  Total cholesterol is 128, down from 157.  HDL is 40, unfortunately, down from 55.  LDL is excellent at 43, down from 65.  Triglycerides are unfortunately 223.  Again, we talked quite a bit about the importance of a low-carbohydrate diet, regular exercise regimen and weight loss as a way to drive HDL up and triglycerides down.      I think his dyspnea on exertion walking up hills is just his weight and deconditioning.  I do not think it is related to Brilinta or beta blockers.  Down the road, I would switch his beta blocker from metoprolol tartrate to metoprolol succinate to make this more convenient.      The last evaluation of his ejection fraction was a nuclear stress test in 07/2018 with an ejection fraction of 82%.  He was able to go 7 minutes and 14 seconds on a treadmill.        I will plan on seeing him back in 9 months, at which time I will recheck his fasting lipid profile and at that time we will consider  stopping his Brilinta.  If he should have any problems, I would add to see him sooner.         LANDON CUELLAR MD, [facility]             D: 10/22/2018   T: 10/22/2018   MT: SOLANGE      Name:     ROHITH DALY   MRN:      [id number]        Account:      [id number]   :      1960           Service Date: 10/22/2018      Document: [id number]         Outpatient Encounter Prescriptions as of 10/22/2018   Medication Sig Dispense Refill     ASPIRIN PO Take 81 mg by mouth every morning        atorvastatin (LIPITOR) 40 MG tablet Take 1 tablet (40 mg) by mouth daily 30 tablet 0     lisinopril (PRINIVIL/ZESTRIL) 40 MG tablet Take 1 tablet (40 mg) by mouth At Bedtime 30 tablet 0     metoprolol tartrate (LOPRESSOR) 25 MG tablet Take 1 tablet (25 mg) by mouth 2 times daily 90 tablet 3     nitroGLYcerin (NITROSTAT) 0.4 MG sublingual tablet For chest pain place 1 tablet under the tongue every 5 minutes for 3 doses. If symptoms persist 5 minutes after 1st dose call 911. 25 tablet 1     omeprazole (PRILOSEC) 20 MG CR capsule Take 20 mg by mouth 2 times daily       ticagrelor (BRILINTA) 90 MG tablet Take 1 tablet (90 mg) by mouth 2 times daily Filled on 7/10/18 for 30 days supply (60 tabs), no refills. 180 tablet 3     venlafaxine (EFFEXOR) 37.5 MG tablet Take 37.5 mg by mouth 2 times daily       VITAMIN D, CHOLECALCIFEROL, PO Take 400 Units by mouth daily       No facility-administered encounter medications on file as of 10/22/2018.        Again, thank you for allowing me to participate in the care of your patient.      Sincerely,    Landon Cuellar MD     [facility]

## 2018-10-22 NOTE — PROGRESS NOTES
HPI and Plan:   See dictation    Orders Placed This Encounter   Procedures     Lipid Profile     ALT     Basic metabolic panel     Lipid Profile     Follow-Up with Cardiologist     Follow-Up with Cardiac Advanced Practice Provider       No orders of the defined types were placed in this encounter.      There are no discontinued medications.      Encounter Diagnoses   Name Primary?     Benign essential hypertension Yes     Mixed hyperlipidemia      ACS (acute coronary syndrome) (H)      Coronary artery disease involving native coronary artery of native heart without angina pectoris      NSTEMI (non-ST elevated myocardial infarction) (H)      Morbid obesity (H)        CURRENT MEDICATIONS:  Current Outpatient Prescriptions   Medication Sig Dispense Refill     ASPIRIN PO Take 81 mg by mouth every morning        atorvastatin (LIPITOR) 40 MG tablet Take 1 tablet (40 mg) by mouth daily 30 tablet 0     lisinopril (PRINIVIL/ZESTRIL) 40 MG tablet Take 1 tablet (40 mg) by mouth At Bedtime 30 tablet 0     metoprolol tartrate (LOPRESSOR) 25 MG tablet Take 1 tablet (25 mg) by mouth 2 times daily 90 tablet 3     nitroGLYcerin (NITROSTAT) 0.4 MG sublingual tablet For chest pain place 1 tablet under the tongue every 5 minutes for 3 doses. If symptoms persist 5 minutes after 1st dose call 911. 25 tablet 1     omeprazole (PRILOSEC) 20 MG CR capsule Take 20 mg by mouth 2 times daily       ticagrelor (BRILINTA) 90 MG tablet Take 1 tablet (90 mg) by mouth 2 times daily Filled on 7/10/18 for 30 days supply (60 tabs), no refills. 180 tablet 3     venlafaxine (EFFEXOR) 37.5 MG tablet Take 37.5 mg by mouth 2 times daily       VITAMIN D, CHOLECALCIFEROL, PO Take 400 Units by mouth daily         ALLERGIES   No Known Allergies    PAST MEDICAL HISTORY:  Past Medical History:   Diagnosis Date     Anxiety      CAD (coronary artery disease)     cardiac cath 7/2017: ADELFO to LAD; NonSTEMI + cath with ADELFO to RCA 6/2017     Hyperlipemia       "Hypertension      Ulcerative colitis (H)        PAST SURGICAL HISTORY:  Past Surgical History:   Procedure Laterality Date     OTHER SURGICAL HISTORY  06/15/2017    cardiac cath: ADELFO to RCA     OTHER SURGICAL HISTORY  07/27/2018    cardiac cath: ADELFO to LAD       FAMILY HISTORY:  Family History   Problem Relation Age of Onset     Myocardial Infarction Mother        SOCIAL HISTORY:  Social History     Social History     Marital status:      Spouse name: N/A     Number of children: N/A     Years of education: N/A     Social History Main Topics     Smoking status: Never Smoker     Smokeless tobacco: Never Used     Alcohol use No      Comment: 2 drinks per year     Drug use: No     Sexual activity: No     Other Topics Concern     Parent/Sibling W/ Cabg, Mi Or Angioplasty Before 65f 55m? Yes     Social History Narrative       Review of Systems:  Skin:  Negative       Eyes:  Positive for glasses    ENT:  Negative      Respiratory:  Positive for sleep apnea;CPAP     Cardiovascular:  Negative      Gastroenterology: Positive for heartburn treated  Genitourinary:  Negative      Musculoskeletal:  Negative      Neurologic:  Negative      Psychiatric:  Negative      Heme/Lymph/Imm:  Negative      Endocrine:  Negative        Physical Exam:  Vitals: /81  Pulse 63  Ht 1.702 m (5' 7\")  Wt 123.2 kg (271 lb 8 oz)  BMI 42.52 kg/m2    Constitutional:  cooperative, alert and oriented, well developed, well nourished, in no acute distress appears anxious;morbidly obese      Skin:  warm and dry to the touch, no apparent skin lesions or masses noted          Head:  normocephalic, no masses or lesions        Eyes:  pupils equal and round, conjunctivae and lids unremarkable, sclera white, no xanthalasma, EOMS intact, no nystagmus        Lymph:      ENT:  no pallor or cyanosis, dentition good        Neck:  carotid pulses are full and equal bilaterally;no carotid bruit        Respiratory:  normal breath sounds, clear to " auscultation, normal A-P diameter, normal symmetry, normal respiratory excursion, no use of accessory muscles         Cardiac: regular rhythm;normal S1 and S2;no S3 or S4;no murmurs, gallops or rubs detected                                                         GI:    obese      Extremities and Muscular Skeletal:  no edema;no spinal abnormalities noted;normal muscle strength and tone              Neurological:  no gross motor deficits        Psych:  affect appropriate, oriented to time, person and place        CC  No referring provider defined for this encounter.

## 2018-10-22 NOTE — MR AVS SNAPSHOT
After Visit Summary   10/22/2018    Darryl Uribe    MRN: [id number]           Patient Information     Date Of Birth          1960        Visit Information        Provider Department      10/22/2018 11:15 AM Landon Amaya MD [facility]        Today's Diagnoses     Benign essential hypertension    -  1    Mixed hyperlipidemia        ACS (acute coronary syndrome) (H)        Coronary artery disease involving native coronary artery of native heart without angina pectoris        NSTEMI (non-ST elevated myocardial infarction) (H)        Morbid obesity (H)           Follow-ups after your visit        Additional Services     Follow-Up with Cardiac Advanced Practice Provider           Follow-Up with Cardiologist                 Future tests that were ordered for you today     Open Future Orders        Priority Expected Expires Ordered    Lipid Profile Routine 7/19/2019 10/22/2019 10/22/2018    ALT Routine 7/19/2019 10/22/2019 10/22/2018    Basic metabolic panel Routine 7/19/2019 10/22/2019 10/22/2018    Follow-Up with Cardiologist Routine 7/19/2019 10/22/2019 10/22/2018    Lipid Profile Routine 2/19/2019 10/22/2019 10/22/2018    Follow-Up with Cardiac Advanced Practice Provider Routine 2/19/2019 10/22/2019 10/22/2018            Who to contact     If you have questions or need follow up information about today's clinic visit or your schedule please contact [facility]   [facility] directly at [phone].  Normal or non-critical lab and imaging results will be communicated to you by MyChart, letter or phone within 4 business days after the clinic has received the results. If you do not hear from us within 7 days, please contact the clinic through MyChart or phone. If you have a critical or abnormal lab result, we will notify you by phone as soon as possible.  Submit refill requests through Sh[employer] or call your pharmacy and they will  "forward the refill request to us. Please allow 3 business days for your refill to be completed.          Additional Information About Your Visit        Airechart Information     [employer] gives you secure access to your electronic health record. If you see a primary care provider, you can also send messages to your care team and make appointments. If you have questions, please call your primary care clinic.  If you do not have a primary care provider, please call [phone] and they will assist you.        Care EveryWhere ID     This is your Care EveryWhere ID. This could be used by other organizations to access your [place] medical records  [id number]        Your Vitals Were     Pulse Height BMI (Body Mass Index)             63 1.702 m (5' 7\") 42.52 kg/m2          Blood Pressure from Last 3 Encounters:   10/22/18 126/81   08/06/18 136/68   07/28/18 120/83    Weight from Last 3 Encounters:   10/22/18 123.2 kg (271 lb 8 oz)   08/06/18 118.9 kg (262 lb 3.2 oz)   07/28/18 120 kg (264 lb 9.6 oz)              We Performed the Following     Follow-Up with Cardiologist        Primary Care Provider Office Phone # Fax #    Oseas Correa [phone]       [facility] [address] [facility] [address]        Equal Access to Services     GARRETT SOSA : Hadii aad ku hadasho Soomaali, waaxda luqadaha, qaybta kaalmada adeegjovannada, ting sharma. So [facility] [phone].    ATENCIÓN: Si habla español, tiene a kincaid disposición servicios gratuitos de asistencia lingüística. Llame al [phone].    We comply with applicable federal civil rights laws and [place] laws. We do not discriminate on the basis of race, color, national origin, age, disability, sex, sexual orientation, or gender identity.            Thank you!     Thank you for choosing [facility]  for your care. Our goal is always to provide you with excellent care. " Hearing back from our patients is one way we can continue to improve our services. Please take a few minutes to complete the written survey that you may receive in the mail after your visit with us. Thank you!             Your Updated Medication List - Protect others around you: Learn how to safely use, store and throw away your medicines at www.disposemymeds.org.          This list is accurate as of 10/22/18 12:14 PM.  Always use your most recent med list.                   Brand Name Dispense Instructions for use Diagnosis    ASPIRIN PO      Take 81 mg by mouth every morning        atorvastatin 40 MG tablet    LIPITOR    30 tablet    Take 1 tablet (40 mg) by mouth daily    NSTEMI (non-ST elevated myocardial infarction) (H)       lisinopril 40 MG tablet    PRINIVIL/ZESTRIL    30 tablet    Take 1 tablet (40 mg) by mouth At Bedtime    NSTEMI (non-ST elevated myocardial infarction) (H)       metoprolol tartrate 25 MG tablet    LOPRESSOR    90 tablet    Take 1 tablet (25 mg) by mouth 2 times daily    NSTEMI (non-ST elevated myocardial infarction) (H)       nitroGLYcerin 0.4 MG sublingual tablet    NITROSTAT    25 tablet    For chest pain place 1 tablet under the tongue every 5 minutes for 3 doses. If symptoms persist 5 minutes after 1st dose call 911.    Postsurgical percutaneous transluminal coronary angioplasty status, NSTEMI (non-ST elevated myocardial infarction) (H), Mixed hyperlipidemia       omeprazole 20 MG CR capsule    priLOSEC     Take 20 mg by mouth 2 times daily        ticagrelor 90 MG tablet    BRILINTA    180 tablet    Take 1 tablet (90 mg) by mouth 2 times daily Filled on 7/10/18 for 30 days supply (60 tabs), no refills.    Antiplatelet or antithrombotic long-term use       venlafaxine 37.5 MG tablet    EFFEXOR     Take 37.5 mg by mouth 2 times daily        VITAMIN D (CHOLECALCIFEROL) PO      Take 400 Units by mouth daily

## 2018-10-22 NOTE — LETTER
10/22/2018    Oseas Correa MD  [facility] [address]  [facility] [address]    RE: Darryl Uribe       Dear Colleague,    I had the pleasure of seeing Darryl Uribe in the [facility].    HPI and Plan:   See dictation    Orders Placed This Encounter   Procedures     Lipid Profile     ALT     Basic metabolic panel     Lipid Profile     Follow-Up with Cardiologist     Follow-Up with Cardiac Advanced Practice Provider       No orders of the defined types were placed in this encounter.      There are no discontinued medications.      Encounter Diagnoses   Name Primary?     Benign essential hypertension Yes     Mixed hyperlipidemia      ACS (acute coronary syndrome) (H)      Coronary artery disease involving native coronary artery of native heart without angina pectoris      NSTEMI (non-ST elevated myocardial infarction) (H)      Morbid obesity (H)        CURRENT MEDICATIONS:  Current Outpatient Prescriptions   Medication Sig Dispense Refill     ASPIRIN PO Take 81 mg by mouth every morning        atorvastatin (LIPITOR) 40 MG tablet Take 1 tablet (40 mg) by mouth daily 30 tablet 0     lisinopril (PRINIVIL/ZESTRIL) 40 MG tablet Take 1 tablet (40 mg) by mouth At Bedtime 30 tablet 0     metoprolol tartrate (LOPRESSOR) 25 MG tablet Take 1 tablet (25 mg) by mouth 2 times daily 90 tablet 3     nitroGLYcerin (NITROSTAT) 0.4 MG sublingual tablet For chest pain place 1 tablet under the tongue every 5 minutes for 3 doses. If symptoms persist 5 minutes after 1st dose call 911. 25 tablet 1     omeprazole (PRILOSEC) 20 MG CR capsule Take 20 mg by mouth 2 times daily       ticagrelor (BRILINTA) 90 MG tablet Take 1 tablet (90 mg) by mouth 2 times daily Filled on 7/10/18 for 30 days supply (60 tabs), no refills. 180 tablet 3     venlafaxine (EFFEXOR) 37.5 MG tablet Take 37.5 mg by mouth 2 times daily       VITAMIN D, CHOLECALCIFEROL, PO Take 400 Units by mouth daily    "      ALLERGIES   No Known Allergies    PAST MEDICAL HISTORY:  Past Medical History:   Diagnosis Date     Anxiety      CAD (coronary artery disease)     cardiac cath 7/2017: ADELFO to LAD; NonSTEMI + cath with ADELFO to RCA 6/2017     Hyperlipemia      Hypertension      Ulcerative colitis (H)        PAST SURGICAL HISTORY:  Past Surgical History:   Procedure Laterality Date     OTHER SURGICAL HISTORY  06/15/2017    cardiac cath: ADELFO to RCA     OTHER SURGICAL HISTORY  07/27/2018    cardiac cath: ADELFO to LAD       FAMILY HISTORY:  Family History   Problem Relation Age of Onset     Myocardial Infarction Mother        SOCIAL HISTORY:  Social History     Social History     Marital status:      Spouse name: N/A     Number of children: N/A     Years of education: N/A     Social History Main Topics     Smoking status: Never Smoker     Smokeless tobacco: Never Used     Alcohol use No      Comment: 2 drinks per year     Drug use: No     Sexual activity: No     Other Topics Concern     Parent/Sibling W/ Cabg, Mi Or Angioplasty Before 65f 55m? Yes     Social History Narrative       Review of Systems:  Skin:  Negative       Eyes:  Positive for glasses    ENT:  Negative      Respiratory:  Positive for sleep apnea;CPAP     Cardiovascular:  Negative      Gastroenterology: Positive for heartburn treated  Genitourinary:  Negative      Musculoskeletal:  Negative      Neurologic:  Negative      Psychiatric:  Negative      Heme/Lymph/Imm:  Negative      Endocrine:  Negative        Physical Exam:  Vitals: /81  Pulse 63  Ht 1.702 m (5' 7\")  Wt 123.2 kg (271 lb 8 oz)  BMI 42.52 kg/m2    Constitutional:  cooperative, alert and oriented, well developed, well nourished, in no acute distress appears anxious;morbidly obese      Skin:  warm and dry to the touch, no apparent skin lesions or masses noted          Head:  normocephalic, no masses or lesions        Eyes:  pupils equal and round, conjunctivae and lids unremarkable, sclera " white, no xanthalasma, EOMS intact, no nystagmus        Lymph:      ENT:  no pallor or cyanosis, dentition good        Neck:  carotid pulses are full and equal bilaterally;no carotid bruit        Respiratory:  normal breath sounds, clear to auscultation, normal A-P diameter, normal symmetry, normal respiratory excursion, no use of accessory muscles         Cardiac: regular rhythm;normal S1 and S2;no S3 or S4;no murmurs, gallops or rubs detected                                                         GI:    obese      Extremities and Muscular Skeletal:  no edema;no spinal abnormalities noted;normal muscle strength and tone              Neurological:  no gross motor deficits        Psych:  affect appropriate, oriented to time, person and place        CC  No referring provider defined for this encounter.                Thank you for allowing me to participate in the care of your patient.      Sincerely,     Landon Amaya MD     [facility]    cc:   No referring provider defined for this encounter.

## 2018-11-08 DIAGNOSIS — I21.4 NSTEMI (NON-ST ELEVATED MYOCARDIAL INFARCTION) (H): ICD-10-CM

## 2018-11-08 RX ORDER — METOPROLOL TARTRATE 25 MG/1
25 TABLET, FILM COATED ORAL 2 TIMES DAILY
Qty: 180 TABLET | Refills: 3 | Status: SHIPPED | OUTPATIENT
Start: 2018-11-08 | End: 2019-12-02

## 2019-06-07 ENCOUNTER — TRANSFERRED RECORDS (OUTPATIENT)
Dept: HEALTH INFORMATION MANAGEMENT | Facility: CLINIC | Age: 59
End: 2019-06-07

## 2019-08-26 ENCOUNTER — PRE VISIT (OUTPATIENT)
Dept: CARDIOLOGY | Facility: CLINIC | Age: 59
End: 2019-08-26

## 2019-08-26 NOTE — TELEPHONE ENCOUNTER
Chart Update for OV with Dr. Amaya scheduled for 8/28/19.   KASHIF Miranda RN, BSN.  08/26/19 4:05 PM

## 2019-08-28 ENCOUNTER — OFFICE VISIT (OUTPATIENT)
Dept: CARDIOLOGY | Facility: CLINIC | Age: 59
End: 2019-08-28
Attending: INTERNAL MEDICINE
Payer: COMMERCIAL

## 2019-08-28 VITALS
WEIGHT: 255.7 LBS | SYSTOLIC BLOOD PRESSURE: 126 MMHG | HEART RATE: 60 BPM | BODY MASS INDEX: 40.13 KG/M2 | DIASTOLIC BLOOD PRESSURE: 80 MMHG | HEIGHT: 67 IN

## 2019-08-28 DIAGNOSIS — E78.2 MIXED HYPERLIPIDEMIA: ICD-10-CM

## 2019-08-28 DIAGNOSIS — I10 BENIGN ESSENTIAL HYPERTENSION: ICD-10-CM

## 2019-08-28 DIAGNOSIS — E66.01 MORBID OBESITY (H): ICD-10-CM

## 2019-08-28 DIAGNOSIS — I21.4 NSTEMI (NON-ST ELEVATED MYOCARDIAL INFARCTION) (H): Primary | ICD-10-CM

## 2019-08-28 DIAGNOSIS — I25.10 CORONARY ARTERY DISEASE INVOLVING NATIVE CORONARY ARTERY OF NATIVE HEART WITHOUT ANGINA PECTORIS: ICD-10-CM

## 2019-08-28 LAB
ALT SERPL W P-5'-P-CCNC: 32 U/L (ref 0–70)
ANION GAP SERPL CALCULATED.3IONS-SCNC: 4 MMOL/L (ref 3–14)
BUN SERPL-MCNC: 21 MG/DL (ref 7–30)
CALCIUM SERPL-MCNC: 9.2 MG/DL (ref 8.5–10.1)
CHLORIDE SERPL-SCNC: 113 MMOL/L (ref 94–109)
CHOLEST SERPL-MCNC: 134 MG/DL
CO2 SERPL-SCNC: 23 MMOL/L (ref 20–32)
CREAT SERPL-MCNC: 0.75 MG/DL (ref 0.66–1.25)
GFR SERPL CREATININE-BSD FRML MDRD: >90 ML/MIN/{1.73_M2}
GLUCOSE SERPL-MCNC: 109 MG/DL (ref 70–99)
HDLC SERPL-MCNC: 43 MG/DL
LDLC SERPL CALC-MCNC: 50 MG/DL
NONHDLC SERPL-MCNC: 91 MG/DL
POTASSIUM SERPL-SCNC: 4.1 MMOL/L (ref 3.4–5.3)
SODIUM SERPL-SCNC: 140 MMOL/L (ref 133–144)
TRIGL SERPL-MCNC: 204 MG/DL

## 2019-08-28 PROCEDURE — 99214 OFFICE O/P EST MOD 30 MIN: CPT | Performed by: INTERNAL MEDICINE

## 2019-08-28 PROCEDURE — 80061 LIPID PANEL: CPT | Performed by: INTERNAL MEDICINE

## 2019-08-28 PROCEDURE — 80048 BASIC METABOLIC PNL TOTAL CA: CPT | Performed by: INTERNAL MEDICINE

## 2019-08-28 PROCEDURE — 84460 ALANINE AMINO (ALT) (SGPT): CPT | Performed by: INTERNAL MEDICINE

## 2019-08-28 PROCEDURE — 36415 COLL VENOUS BLD VENIPUNCTURE: CPT | Performed by: INTERNAL MEDICINE

## 2019-08-28 ASSESSMENT — MIFFLIN-ST. JEOR: SCORE: 1933.48

## 2019-08-28 NOTE — LETTER
8/28/2019      Oseas Correa MD  [facility] [address]  [facility] [address]      RE: Darryl Uribe       Dear Colleague,    I had the pleasure of seeing Darryl Uribe in the [facility].    Service Date: 08/28/2019      HISTORY OF PRESENT ILLNESS:  Mr. Uribe is a very nice 59-year-old gentleman with past medical history significant for hypertension, hypercholesterolemia, a very strong family history of coronary artery disease with both his mother and father having heart attacks in their 60s.  He is a lifelong nonsmoker.  He unfortunately has morbid obesity and known coronary artery disease with stenting of his right coronary in 2016 after a non-ST segment elevation myocardial infarction.  He received a second stent in his left anterior descending artery in 08/2018 again for an acute coronary syndrome.      Darryl returns to clinic stating he is doing quite well.  He has had no chest, arm, neck, jaw or shoulder discomfort.  No dyspnea on exertion, orthopnea or PND.  No palpitations, lightheadedness, dizziness, syncope or near-syncope.  He notes no side effects or problems with his current medical regimen.      Darryl states he used to exercise pretty regularly, but after his first heart attack, he became quite anxious and fearful of exercising and avoided it.  He also has had problems with portion control.  He loves his carbohydrates and as a result has become morbidly obese.      Darryl states he is now exercising more routinely, he is following a diet program and has managed to lose 16 pounds of weight from last fall on my scale, but states he has lost 30 pounds overall.  He states it is coming off slowly, but it appears to be steadily coming off.      ASSESSMENT AND PLAN:  Darryl appears to be doing quite well from a cardiac standpoint.  He is 1 year out from his infarct, and I have told him he can finish out his Brilinta, which is quite expensive for him, and then  stop.  He will continue on his aspirin indefinitely.      He has no symptoms to suggest heart failure or significant arrhythmia.      Blood pressure is very well controlled at 126/80 with a pulse of 60.      As stated, weight is 255 pounds.  He has still got a body mass index of 40 with clothes on, but as stated, this is down 16 pounds on my scale.  I have congratulated him on this.      His weight loss and exercise does reflect in his fasting lipid profile, which is much improved.  Total cholesterol is 134, down from 148, HDL is 43, LDL is 50, down from 63, triglycerides remain high at 204.  Looking at his glucose, he does have glucose intolerance, so as I have explained to him, he has a tendency to becoming full-blown diabetic.  If he were to gain more weight, becomes more inactive, glucose would go up and triglycerides would go up, and his cholesterol profile would backslide.  I have encouraged him to continue to try to lose weight and continue his exercise regimen.      Basic metabolic profile is normal.  Creatinine is 0.75 with a BUN of 21, potassium of 4.1.      We reviewed his medications.  We will continue them all as is, other than discontinuing Brilinta when it runs out.      I will have him follow up with my RIVERA in 1 year.  I will see him back in 2 years.  If he should have any problems, I would be glad to see him sooner.      Thank you for allowing me to participate in his care.      Landon Cuellar MD, FACC         LANDON CUELLAR MD, FACC             D: 2019   T: 2019   MT: DU      Name:     ROHITH DALY   MRN:      [id number]        Account:      [id number]   :      1960           Service Date: 2019      Document: [id number]         Outpatient Encounter Medications as of 2019   Medication Sig Dispense Refill     ASPIRIN PO Take 81 mg by mouth every morning        atorvastatin (LIPITOR) 40 MG tablet Take 1 tablet (40 mg) by mouth daily 30 tablet 0     lisinopril  (PRINIVIL/ZESTRIL) 40 MG tablet Take 1 tablet (40 mg) by mouth At Bedtime 30 tablet 0     metoprolol tartrate (LOPRESSOR) 25 MG tablet Take 1 tablet (25 mg) by mouth 2 times daily 180 tablet 3     nitroGLYcerin (NITROSTAT) 0.4 MG sublingual tablet For chest pain place 1 tablet under the tongue every 5 minutes for 3 doses. If symptoms persist 5 minutes after 1st dose call 911. 25 tablet 1     omeprazole (PRILOSEC) 20 MG CR capsule Take 20 mg by mouth 2 times daily       venlafaxine (EFFEXOR) 37.5 MG tablet Take 37.5 mg by mouth 2 times daily       VITAMIN D, CHOLECALCIFEROL, PO Take 400 Units by mouth daily       [DISCONTINUED] ticagrelor (BRILINTA) 90 MG tablet Take 1 tablet (90 mg) by mouth 2 times daily Filled on 7/10/18 for 30 days supply (60 tabs), no refills. 180 tablet 3     No facility-administered encounter medications on file as of 8/28/2019.        Again, thank you for allowing me to participate in the care of your patient.      Sincerely,    Landon Amaya MD     [facility]

## 2019-08-28 NOTE — PROGRESS NOTES
HPI and Plan:   See dictation    Orders Placed This Encounter   Procedures     Basic metabolic panel     Lipid Profile     Follow-Up with Cardiac Advanced Practice Provider     Follow-Up with Cardiologist       No orders of the defined types were placed in this encounter.      Medications Discontinued During This Encounter   Medication Reason     ticagrelor (BRILINTA) 90 MG tablet          Encounter Diagnoses   Name Primary?     Coronary artery disease involving native coronary artery of native heart without angina pectoris      NSTEMI (non-ST elevated myocardial infarction) (H) Yes     Benign essential hypertension      Mixed hyperlipidemia      Morbid obesity (H)        CURRENT MEDICATIONS:  Current Outpatient Medications   Medication Sig Dispense Refill     ASPIRIN PO Take 81 mg by mouth every morning        atorvastatin (LIPITOR) 40 MG tablet Take 1 tablet (40 mg) by mouth daily 30 tablet 0     lisinopril (PRINIVIL/ZESTRIL) 40 MG tablet Take 1 tablet (40 mg) by mouth At Bedtime 30 tablet 0     metoprolol tartrate (LOPRESSOR) 25 MG tablet Take 1 tablet (25 mg) by mouth 2 times daily 180 tablet 3     nitroGLYcerin (NITROSTAT) 0.4 MG sublingual tablet For chest pain place 1 tablet under the tongue every 5 minutes for 3 doses. If symptoms persist 5 minutes after 1st dose call 911. 25 tablet 1     omeprazole (PRILOSEC) 20 MG CR capsule Take 20 mg by mouth 2 times daily       venlafaxine (EFFEXOR) 37.5 MG tablet Take 37.5 mg by mouth 2 times daily       VITAMIN D, CHOLECALCIFEROL, PO Take 400 Units by mouth daily         ALLERGIES   No Known Allergies    PAST MEDICAL HISTORY:  Past Medical History:   Diagnosis Date     Anxiety      CAD (coronary artery disease)     cardiac cath 7/2017: ADELFO to LAD; NonSTEMI + cath with ADELFO to RCA 6/2017     Hyperlipemia      Hypertension      Ulcerative colitis (H)        PAST SURGICAL HISTORY:  Past Surgical History:   Procedure Laterality Date     OTHER SURGICAL HISTORY  06/15/2017     cardiac cath: ADELFO to RCA     OTHER SURGICAL HISTORY  07/27/2018    cardiac cath: ADELFO to LAD       FAMILY HISTORY:  Family History   Problem Relation Age of Onset     Myocardial Infarction Mother        SOCIAL HISTORY:  Social History     Socioeconomic History     Marital status:      Spouse name: None     Number of children: None     Years of education: None     Highest education level: None   Occupational History     None   Social Needs     Financial resource strain: None     Food insecurity:     Worry: None     Inability: None     Transportation needs:     Medical: None     Non-medical: None   Tobacco Use     Smoking status: Never Smoker     Smokeless tobacco: Never Used   Substance and Sexual Activity     Alcohol use: No     Comment: 2 drinks per year if that     Drug use: No     Sexual activity: Never   Lifestyle     Physical activity:     Days per week: None     Minutes per session: None     Stress: None   Relationships     Social connections:     Talks on phone: None     Gets together: None     Attends Holiness service: None     Active member of club or organization: None     Attends meetings of clubs or organizations: None     Relationship status: None     Intimate partner violence:     Fear of current or ex partner: None     Emotionally abused: None     Physically abused: None     Forced sexual activity: None   Other Topics Concern     Parent/sibling w/ CABG, MI or angioplasty before 65F 55M? Yes   Social History Narrative     None       Review of Systems:  Skin:  Positive for pigmentation more redness in his face   Eyes:  Positive for glasses reading glasses  ENT:  Negative      Respiratory:  Positive for sleep apnea;CPAP     Cardiovascular:  Negative      Gastroenterology: Positive for heartburn treated  Genitourinary:  Negative      Musculoskeletal:  Positive for back pain    Neurologic:  Negative      Psychiatric:  Negative      Heme/Lymph/Imm:  Negative      Endocrine:  Negative     "    Physical Exam:  Vitals: /80 (BP Location: Right arm, Patient Position: Chair, Cuff Size: Adult Large)   Pulse 60   Ht 1.702 m (5' 7\")   Wt 116 kg (255 lb 11.2 oz)   BMI 40.05 kg/m      Constitutional:  cooperative, alert and oriented, well developed, well nourished, in no acute distress obese      Skin:  warm and dry to the touch, no apparent skin lesions or masses noted          Head:  normocephalic, no masses or lesions        Eyes:  pupils equal and round, conjunctivae and lids unremarkable, sclera white, no xanthalasma, EOMS intact, no nystagmus        Lymph:      ENT:  no pallor or cyanosis, dentition good        Neck:  carotid pulses are full and equal bilaterally;no carotid bruit        Respiratory:  normal breath sounds, clear to auscultation, normal A-P diameter, normal symmetry, normal respiratory excursion, no use of accessory muscles         Cardiac: regular rhythm;normal S1 and S2;no S3 or S4;no murmurs, gallops or rubs detected                pulses full and equal                                        GI:    obese      Extremities and Muscular Skeletal:  no edema;no spinal abnormalities noted;normal muscle strength and tone              Neurological:  no gross motor deficits        Psych:  affect appropriate, oriented to time, person and place        CC  Landon Amaya MD  [address]  [place] [address]              "

## 2019-08-28 NOTE — PROGRESS NOTES
Service Date: 08/28/2019      HISTORY OF PRESENT ILLNESS:  Mr. Uribe is a very nice 59-year-old gentleman with past medical history significant for hypertension, hypercholesterolemia, a very strong family history of coronary artery disease with both his mother and father having heart attacks in their 60s.  He is a lifelong nonsmoker.  He unfortunately has morbid obesity and known coronary artery disease with stenting of his right coronary in 2016 after a non-ST segment elevation myocardial infarction.  He received a second stent in his left anterior descending artery in 08/2018 again for an acute coronary syndrome.      Darryl returns to clinic stating he is doing quite well.  He has had no chest, arm, neck, jaw or shoulder discomfort.  No dyspnea on exertion, orthopnea or PND.  No palpitations, lightheadedness, dizziness, syncope or near-syncope.  He notes no side effects or problems with his current medical regimen.      Darryl states he used to exercise pretty regularly, but after his first heart attack, he became quite anxious and fearful of exercising and avoided it.  He also has had problems with portion control.  He loves his carbohydrates and as a result has become morbidly obese.      Darryl states he is now exercising more routinely, he is following a diet program and has managed to lose 16 pounds of weight from last fall on my scale, but states he has lost 30 pounds overall.  He states it is coming off slowly, but it appears to be steadily coming off.      ASSESSMENT AND PLAN:  Darryl appears to be doing quite well from a cardiac standpoint.  He is 1 year out from his infarct, and I have told him he can finish out his Brilinta, which is quite expensive for him, and then stop.  He will continue on his aspirin indefinitely.      He has no symptoms to suggest heart failure or significant arrhythmia.      Blood pressure is very well controlled at 126/80 with a pulse of 60.      As stated, weight is 255 pounds.  He  has still got a body mass index of 40 with clothes on, but as stated, this is down 16 pounds on my scale.  I have congratulated him on this.      His weight loss and exercise does reflect in his fasting lipid profile, which is much improved.  Total cholesterol is 134, down from 148, HDL is 43, LDL is 50, down from 63, triglycerides remain high at 204.  Looking at his glucose, he does have glucose intolerance, so as I have explained to him, he has a tendency to becoming full-blown diabetic.  If he were to gain more weight, becomes more inactive, glucose would go up and triglycerides would go up, and his cholesterol profile would backslide.  I have encouraged him to continue to try to lose weight and continue his exercise regimen.      Basic metabolic profile is normal.  Creatinine is 0.75 with a BUN of 21, potassium of 4.1.      We reviewed his medications.  We will continue them all as is, other than discontinuing Brilinta when it runs out.      I will have him follow up with my RIVERA in 1 year.  I will see him back in 2 years.  If he should have any problems, I would be glad to see him sooner.      Thank you for allowing me to participate in his care.      Landon Cuellar MD, FACC         LANDON CUELLAR MD, FACC             D: 2019   T: 2019   MT: DU      Name:     ROHITH DALY   MRN:      [id number]        Account:      [id number]   :      1960           Service Date: 2019      Document: [id number]

## 2019-08-28 NOTE — LETTER
8/28/2019    Oseas Correa MD  [facility] [address]  [facility] [address]    RE: Darryl Uribe       Dear Colleague,    [facility] had the [facility].    HPI and Plan:   See dictation    Orders Placed This Encounter   Procedures     Basic metabolic panel     Lipid Profile     Follow-Up with Cardiac Advanced Practice Provider     Follow-Up with Cardiologist       No orders of the defined types were placed in this encounter.      Medications Discontinued During This Encounter   Medication Reason     ticagrelor (BRILINTA) 90 MG tablet          Encounter Diagnoses   Name Primary?     Coronary artery disease involving native coronary artery of native heart without angina pectoris      NSTEMI (non-ST elevated myocardial infarction) (H) Yes     Benign essential hypertension      Mixed hyperlipidemia      Morbid obesity (H)        CURRENT MEDICATIONS:  Current Outpatient Medications   Medication Sig Dispense Refill     ASPIRIN PO Take 81 mg by mouth every morning        atorvastatin (LIPITOR) 40 MG tablet Take 1 tablet (40 mg) by mouth daily 30 tablet 0     lisinopril (PRINIVIL/ZESTRIL) 40 MG tablet Take 1 tablet (40 mg) by mouth At Bedtime 30 tablet 0     metoprolol tartrate (LOPRESSOR) 25 MG tablet Take 1 tablet (25 mg) by mouth 2 times daily 180 tablet 3     nitroGLYcerin (NITROSTAT) 0.4 MG sublingual tablet For chest pain place 1 tablet under the tongue every 5 minutes for 3 doses. If symptoms persist 5 minutes after 1st dose call 911. 25 tablet 1     omeprazole (PRILOSEC) 20 MG CR capsule Take 20 mg by mouth 2 times daily       venlafaxine (EFFEXOR) 37.5 MG tablet Take 37.5 mg by mouth 2 times daily       VITAMIN D, CHOLECALCIFEROL, PO Take 400 Units by mouth daily         ALLERGIES   No Known Allergies    PAST MEDICAL HISTORY:  Past Medical History:   Diagnosis Date     Anxiety      CAD (coronary artery disease)     cardiac cath  7/2017: ADELFO to LAD; NonSTEMI + cath with ADELFO to RCA 6/2017     Hyperlipemia      Hypertension      Ulcerative colitis (H)        PAST SURGICAL HISTORY:  Past Surgical History:   Procedure Laterality Date     OTHER SURGICAL HISTORY  06/15/2017    cardiac cath: ADELFO to RCA     OTHER SURGICAL HISTORY  07/27/2018    cardiac cath: ADELFO to LAD       FAMILY HISTORY:  Family History   Problem Relation Age of Onset     Myocardial Infarction Mother        SOCIAL HISTORY:  Social History     Socioeconomic History     Marital status:      Spouse name: None     Number of children: None     Years of education: None     Highest education level: None   Occupational History     None   Social Needs     Financial resource strain: None     Food insecurity:     Worry: None     Inability: None     Transportation needs:     Medical: None     Non-medical: None   Tobacco Use     Smoking status: Never Smoker     Smokeless tobacco: Never Used   Substance and Sexual Activity     Alcohol use: No     Comment: 2 drinks per year if that     Drug use: No     Sexual activity: Never   Lifestyle     Physical activity:     Days per week: None     Minutes per session: None     Stress: None   Relationships     Social connections:     Talks on phone: None     Gets together: None     Attends Synagogue service: None     Active member of club or organization: None     Attends meetings of clubs or organizations: None     Relationship status: None     Intimate partner violence:     Fear of current or ex partner: None     Emotionally abused: None     Physically abused: None     Forced sexual activity: None   Other Topics Concern     Parent/sibling w/ CABG, MI or angioplasty before 65F 55M? Yes   Social History Narrative     None       Review of Systems:  Skin:  Positive for pigmentation more redness in his face   Eyes:  Positive for glasses reading glasses  ENT:  Negative      Respiratory:  Positive for sleep apnea;CPAP     Cardiovascular:  Negative     "  Gastroenterology: Positive for heartburn treated  Genitourinary:  Negative      Musculoskeletal:  Positive for back pain    Neurologic:  Negative      Psychiatric:  Negative      Heme/Lymph/Imm:  Negative      Endocrine:  Negative        Physical Exam:  Vitals: /80 (BP Location: Right arm, Patient Position: Chair, Cuff Size: Adult Large)   Pulse 60   Ht 1.702 m (5' 7\")   Wt 116 kg (255 lb 11.2 oz)   BMI 40.05 kg/m       Constitutional:  cooperative, alert and oriented, well developed, well nourished, in no acute distress obese      Skin:  warm and dry to the touch, no apparent skin lesions or masses noted          Head:  normocephalic, no masses or lesions        Eyes:  pupils equal and round, conjunctivae and lids unremarkable, sclera white, no xanthalasma, EOMS intact, no nystagmus        Lymph:      ENT:  no pallor or cyanosis, dentition good        Neck:  carotid pulses are full and equal bilaterally;no carotid bruit        Respiratory:  normal breath sounds, clear to auscultation, normal A-P diameter, normal symmetry, normal respiratory excursion, no use of accessory muscles         Cardiac: regular rhythm;normal S1 and S2;no S3 or S4;no murmurs, gallops or rubs detected                pulses full and equal                                        GI:    obese      Extremities and Muscular Skeletal:  no edema;no spinal abnormalities noted;normal muscle strength and tone              Neurological:  no gross motor deficits        Psych:  affect appropriate, oriented to time, person and place        CC  Landon Amaya MD  [address]  [place] [address]                Thank you for allowing me to participate in the care of your patient.      Sincerely,     Landon Amaya MD     [facility]    cc:   Landon Amaya MD  [address]  [place] [address]        "

## 2019-10-01 ENCOUNTER — HEALTH MAINTENANCE LETTER (OUTPATIENT)
Age: 59
End: 2019-10-01

## 2019-12-02 DIAGNOSIS — I21.4 NSTEMI (NON-ST ELEVATED MYOCARDIAL INFARCTION) (H): ICD-10-CM

## 2019-12-02 DIAGNOSIS — I25.10 CORONARY ARTERY DISEASE INVOLVING NATIVE CORONARY ARTERY OF NATIVE HEART WITHOUT ANGINA PECTORIS: Primary | ICD-10-CM

## 2019-12-02 RX ORDER — METOPROLOL TARTRATE 25 MG/1
25 TABLET, FILM COATED ORAL 2 TIMES DAILY
Qty: 180 TABLET | Refills: 2 | Status: SHIPPED | OUTPATIENT
Start: 2019-12-02 | End: 2019-12-02

## 2019-12-02 RX ORDER — METOPROLOL TARTRATE 25 MG/1
25 TABLET, FILM COATED ORAL 2 TIMES DAILY
Qty: 25 TABLET | Refills: 0 | Status: SHIPPED | OUTPATIENT
Start: 2019-12-02 | End: 2019-12-09

## 2019-12-02 NOTE — TELEPHONE ENCOUNTER
Call from patient, he needs a local Rx for his lopressor as well as his annual refill sent to the mail order pharmacy. He only has 3 days left. He states his pharmacy could not get through over the holiday weekend to refill so he is low on pills. Rx for #25 pills escripted locally and mail order also escripted.

## 2019-12-09 DIAGNOSIS — I25.10 CORONARY ARTERY DISEASE INVOLVING NATIVE CORONARY ARTERY OF NATIVE HEART WITHOUT ANGINA PECTORIS: ICD-10-CM

## 2019-12-09 RX ORDER — METOPROLOL TARTRATE 25 MG/1
25 TABLET, FILM COATED ORAL 2 TIMES DAILY
Qty: 90 TABLET | Refills: 3 | Status: SHIPPED | OUTPATIENT
Start: 2019-12-09 | End: 2019-12-09

## 2019-12-09 RX ORDER — METOPROLOL TARTRATE 25 MG/1
25 TABLET, FILM COATED ORAL 2 TIMES DAILY
Qty: 180 TABLET | Refills: 3 | Status: SHIPPED | OUTPATIENT
Start: 2019-12-09 | End: 2020-10-08

## 2020-07-24 ENCOUNTER — HOSPITAL ENCOUNTER (OUTPATIENT)
Dept: WOUND CARE | Facility: CLINIC | Age: 60
Discharge: HOME | End: 2020-07-24
Attending: COLON & RECTAL SURGERY | Admitting: COLON & RECTAL SURGERY
Payer: COMMERCIAL

## 2020-07-24 PROCEDURE — G0463 HOSPITAL OUTPT CLINIC VISIT: HCPCS

## 2020-07-24 NOTE — PROGRESS NOTES
"[facility]  WO Nurse Inpatient Ostomy Assessment     Assessment of Established ileostomy with new para-stomal hernia  Surgery date:  8-  Surgeon:  Dr Blackburn [place]-rectal   Procedure:  Total proctocolectomy with permanent ileostomy   Related diagnosis:   ZULEIMA    WO Assessment & Physical Exam:        Current status: dx with new parastomal hernia. To clinic for fitting of Nu-Hope hernia belt      Stoma size: apporx 1 3/8\"    Stoma appearance:  Rounded, red,moist protrudes lumen @ apes    Mucocutaneous junction:  Healed    Peristomal complication(s): scattered folliculitis secondary to shaving    Abdominal assessment: large firm panus with new para-stomal hernia dx by Dr Walker    Surgical site(s): Healed    NG still in place? no    Output: Semi-liquid to liquid    Pain: tenderness in parastomal area and back        Current pouching system: Aline NI Image convex with drainable lock n roll opaque    Pouch last changed:  7-24-19    Reason for pouch change today: stoma and reanna-stomal eval       Learning and comprehension: discussed use of belt and rationale    Psychosocial: issues dealing with body image secondary to hernia      WOC Plan:         Pouching product plan:  No change to current system     Frequency of pouch changes: 2-3x/week      WOC Interventions:       Patient's chart evaluated    Focus of today's visit: Fitting for Nu-Hope hernia belt    Pouch  Aline  NI convex pre-cut with drainable pouch     Below if the information for the Custom Nu-Hope hernia belt.     Nu-Hope Hernia Belt  #BG 6434-DC 540L Reference # [id number]  1. Nu-Form  2. Cool Comfort  3. 6  Width  4. Beige  5. Rt stoma  6. 2 7/8  center opening  7. 55  circumference  8.   Pt is current client of [employer]     Called to discuss with Nu-Hope and [employer] (pts supplier)    Flor Chand CWOC Nurse        Rhonda Chand, RN, CWOCN        "

## 2020-09-21 ENCOUNTER — TRANSFERRED RECORDS (OUTPATIENT)
Dept: HEALTH INFORMATION MANAGEMENT | Facility: CLINIC | Age: 60
End: 2020-09-21

## 2020-10-05 NOTE — PATIENT INSTRUCTIONS
Thank you for coming into the [facility] today.    Today's plan:   1. Switch from atorvastatin to rosuvastatin 40 mg once daily.   2. Recheck fasting labs in 1-2 months to recheck the cholesterol levels.  3. Continue to try to exercise regularly and stick to a heart healthy diet to try to lose weight.   4. Consider enrolling in the W[employer] Program to help get regular exercise and help with weight loss.  5. Follow up with Dr. Amaya with fasting labs beforehand to recheck you cholesterol levels.     If you have questions or concerns, please call my nurse team at [phone].    Scheduling phone number: [phone]    It was a pleasure seeing you today!     Javier Cruz, Nurse Practitioner  [facility]  October 8, 2020  ________________________________________________________

## 2020-10-05 NOTE — PROGRESS NOTES
Cardiology Clinic Progress Note    Service Date: October 8, 2020    PRIMARY CARDIOLOGIST: Dr. Amaya    seeing  REASON FOR VISIT: Annual follow up    HPI:   I had the pleasure of meeting Mr. Darryl Uribe in the clinic today.  He is a very pleasant 60 year old male with a past medical history notable for hypertension, hyperlipidemia, and a very strong family history of coronary artery disease with both his mother and father having heart attacks in their 60s.  He is a lifelong nonsmoker. He has morbid obesity and known coronary artery disease with stenting of his right coronary in 2016 after a non-ST segment elevation myocardial infarction. He received a second stent in his left anterior descending artery in 08/2018 again for an acute coronary syndrome. Darryl has followed with Dr. Amaya for a number of years for his cardiology care.    He last met with Dr. Amaya in August 2019. He was doing quite well from a cardiac standpoint at that time. He had been working on exercising more regularly and had lost around 15 pounds.    Today, he presents to the clinic for a routine annual follow up.  He tells me that he has struggled with low back pain and some depression intermittently over the past year which has really set him back as far as exercising.  As a result, he has unfortunately gained a lot of weight back. He is up to 284 pounds on our scale today with a BMI of 44, up around 30 pounds from last year.  He is quite ashamed about this and is motivated to try to get things back on track.  He has been seeing a chiropractor of late and feels that he finally has his back pain under better control. He has started trying to do a walking routine more regularly up to around 3 miles a few times a week. He has been tolerating this well without exertional symptoms. He specifically denies any issues with chest pain, palpitations, dizziness, shortness of breath, orthopnea, or significant lower extremity edema.    He has been  checking his blood pressures fairly regularly at home getting readings consistently around the 120s/80s. His blood pressure is mildly elevated in the clinic today at 144/89, although he states that this is likely because he was running a bit late and was admittedly somewhat anxious to discuss his weight gain over the past year. I personally rechecked his blood pressure with result of 135/77.  He recently had labs checked at his primary care physician's office with his LDL cholesterol unsurprisingly trending up slightly to 78.  He has been on atorvastatin 40 mg daily and has been tolerating this well. We talked about trying to push his LDL cholesterol lower at least under 70 or further given his history and risk factors.    ASSESSMENT AND PLAN:  1. Coronary artery disease  - Status post stenting of the RCA in 2016 and LAD in 2018.  - Stable without symptoms concerning for arrhythmia, heart failure, or ischemia.  - On a good regimen of aspirin, statin, beta-blocker, and ACE inhibitor. Counseled on lifestyle modifications including trying to get more regular exercise and stick to a heart healthy diet.    2. Hypertension  - Blood pressure is mildly elevated in the clinic today but reportedly well controlled and his checks at home. I made no changes to his regimen today but encouraged him to continue to monitor his blood pressures at home and call if they begin to trend higher than 130/85.    3. Hyperlipidemia  - LDL cholesterol has trended up to 78 on a recent check at his PCP, unsurprisingly with his weight gain over the past year. He was agreeable to stopping atorvastatin and switching to rosuvastatin 40 mg once daily to try to push his LDL at least under 70 or further. We will plan to repeat a fasting lipid profile ALT in the next 1 to 2 months.    4. Morbid obesity   - As described above, the majority of our conversation today was spent on counseling as the patient was quite disappointed in himself for the setback  that he has had with significant weight gain of around 30 pounds over the past year. We discussed the option of enrolling in the WEL program to help with goalsetting and accountability. When the colder weather comes, he does have a gym that he is planning to join and he is hoping to continue make progress with his walking routine until then.    Thank you for the opportunity to participate in this pleasant patient's care.  We will follow-up with him by phone on the results of his repeat lipid profile and any further recommendations pending these results. He will otherwise see Dr. Amaya in follow up in 1 year. We would be happy to see him sooner if needed for any concerns in the meantime.      ZACHERY Rowland, CNP   Text Page  (8am - 5pm, M-F)    Orders this Visit:  No orders of the defined types were placed in this encounter.    No orders of the defined types were placed in this encounter.    There are no discontinued medications.  Encounter Diagnoses   Name Primary?     Coronary artery disease involving native coronary artery of native heart without angina pectoris Yes     Benign essential hypertension      Mixed hyperlipidemia      Morbid obesity (H)      CURRENT MEDICATIONS:  Current Outpatient Medications   Medication Sig Dispense Refill     ASPIRIN PO Take 81 mg by mouth every morning        atorvastatin (LIPITOR) 40 MG tablet Take 1 tablet (40 mg) by mouth daily 30 tablet 0     lisinopril (PRINIVIL/ZESTRIL) 40 MG tablet Take 1 tablet (40 mg) by mouth At Bedtime 30 tablet 0     metoprolol tartrate (LOPRESSOR) 25 MG tablet Take 1 tablet (25 mg) by mouth 2 times daily 180 tablet 3     nitroGLYcerin (NITROSTAT) 0.4 MG sublingual tablet For chest pain place 1 tablet under the tongue every 5 minutes for 3 doses. If symptoms persist 5 minutes after 1st dose call 911. 25 tablet 1     omeprazole (PRILOSEC) 20 MG CR capsule Take 20 mg by mouth 2 times daily       venlafaxine (EFFEXOR) 37.5 MG tablet Take 37.5 mg by  mouth 2 times daily       VITAMIN D, CHOLECALCIFEROL, PO Take 400 Units by mouth daily       ALLERGIES  No Known Allergies    PAST MEDICAL, SURGICAL, FAMILY HISTORY:  History was reviewed and updated as needed, see medical record.    SOCIAL HISTORY:  Social History     Socioeconomic History     Marital status:      Spouse name: Not on file     Number of children: Not on file     Years of education: Not on file     Highest education level: Not on file   Occupational History     Not on file   Social Needs     Financial resource strain: Not on file     Food insecurity     Worry: Not on file     Inability: Not on file     Transportation needs     Medical: Not on file     Non-medical: Not on file   Tobacco Use     Smoking status: Never Smoker     Smokeless tobacco: Never Used   Substance and Sexual Activity     Alcohol use: No     Comment: 2 drinks per year if that     Drug use: No     Sexual activity: Never   Lifestyle     Physical activity     Days per week: Not on file     Minutes per session: Not on file     Stress: Not on file   Relationships     Social connections     Talks on phone: Not on file     Gets together: Not on file     Attends Restorationism service: Not on file     Active member of club or organization: Not on file     Attends meetings of clubs or organizations: Not on file     Relationship status: Not on file     Intimate partner violence     Fear of current or ex partner: Not on file     Emotionally abused: Not on file     Physically abused: Not on file     Forced sexual activity: Not on file   Other Topics Concern     Parent/sibling w/ CABG, MI or angioplasty before 65F 55M? Yes   Social History Narrative     Not on file     Review of Systems:  Skin:  Negative     Eyes:  Positive for glasses  ENT:  Negative    Respiratory:  Positive for sleep apnea;CPAP  Cardiovascular:  Negative    Gastroenterology: Negative    Genitourinary:  Negative    Musculoskeletal:  Positive for back pain  Neurologic:   "Negative    Psychiatric:  Negative    Heme/Lymph/Imm:  Negative    Endocrine:  Negative       Physical Exam:  Vitals: BP (!) 144/89 (BP Location: Right arm, Patient Position: Sitting, Cuff Size: Adult Large)   Pulse 67   Ht 1.702 m (5' 7\")   Wt 128.9 kg (284 lb 1.6 oz)   BMI 44.50 kg/m     Wt Readings from Last 4 Encounters:   10/08/20 128.9 kg (284 lb 1.6 oz)   08/28/19 116 kg (255 lb 11.2 oz)   10/22/18 123.2 kg (271 lb 8 oz)   08/06/18 118.9 kg (262 lb 3.2 oz)     CONSTITUTIONAL: Pleasant, obese gentleman in no acute distress.  HEENT: Pupils equal, round. Sclerae nonicteric.    NECK: Supple, thick.  Unable to assess JVP due to body habitus.  C/V:  Regular rate and rhythm, normal S1 and S2, no S3 or S4, no murmur, rub or gallop.  RESP: Respirations are unlabored. Lungs are clear to auscultation bilaterally without wheezing, rales, or rhonchi.  EXTREM: No clubbing, cyanosis, or lower extremity edema bilaterally.   NEURO: Alert and oriented, cooperative. Gait steady. No gross focal deficits.   PSYCH: Affect appropriate. Mentation normal. Responds to questions appropriately.  SKIN: Warm and dry.    Recent Lab Results:  LIPID RESULTS:  Lab Results   Component Value Date    CHOL 134 08/28/2019    HDL 43 08/28/2019    LDL 50 08/28/2019    TRIG 204 (H) 08/28/2019    CHOLHDLRATIO 2.6 08/11/2009     LIVER ENZYME RESULTS:  Lab Results   Component Value Date    AST 27 10/17/2017    ALT 32 08/28/2019     CBC RESULTS:  Lab Results   Component Value Date    WBC 5.4 08/06/2018    RBC 4.42 08/06/2018    HGB 13.2 (L) 08/06/2018    HCT 38.8 (L) 08/06/2018    MCV 88 08/06/2018    MCH 29.9 08/06/2018    MCHC 34.0 08/06/2018    RDW 12.7 08/06/2018     08/06/2018     BMP RESULTS:  Lab Results   Component Value Date     08/28/2019    POTASSIUM 4.1 08/28/2019    CHLORIDE 113 (H) 08/28/2019    CO2 23 08/28/2019    ANIONGAP 4 08/28/2019     (H) 08/28/2019    BUN 21 08/28/2019    CR 0.75 08/28/2019    GFRESTIMATED " >90 08/28/2019    GFRESTBLACK >90 08/28/2019    DYANA 9.2 08/28/2019      A1C RESULTS:  Lab Results   Component Value Date    A1C 5.8 06/14/2017     INR RESULTS:  Lab Results   Component Value Date    INR 1.09 07/25/2018    INR Canceled, Test credited 07/25/2018     CC  Oseas Correa MD  [facility]  [address] [place] [address]    This note was completed in part using Dragon voice recognition software. Although reviewed after completion, some word and grammatical errors may occur.

## 2020-10-08 ENCOUNTER — OFFICE VISIT (OUTPATIENT)
Dept: CARDIOLOGY | Facility: CLINIC | Age: 60
End: 2020-10-08
Payer: COMMERCIAL

## 2020-10-08 VITALS
WEIGHT: 284.1 LBS | HEART RATE: 67 BPM | DIASTOLIC BLOOD PRESSURE: 89 MMHG | BODY MASS INDEX: 44.59 KG/M2 | SYSTOLIC BLOOD PRESSURE: 144 MMHG | HEIGHT: 67 IN

## 2020-10-08 DIAGNOSIS — I10 BENIGN ESSENTIAL HYPERTENSION: ICD-10-CM

## 2020-10-08 DIAGNOSIS — E78.2 MIXED HYPERLIPIDEMIA: ICD-10-CM

## 2020-10-08 DIAGNOSIS — I25.10 CORONARY ARTERY DISEASE INVOLVING NATIVE CORONARY ARTERY OF NATIVE HEART WITHOUT ANGINA PECTORIS: Primary | ICD-10-CM

## 2020-10-08 DIAGNOSIS — E66.01 MORBID OBESITY (H): ICD-10-CM

## 2020-10-08 DIAGNOSIS — I21.4 NSTEMI (NON-ST ELEVATED MYOCARDIAL INFARCTION) (H): ICD-10-CM

## 2020-10-08 PROCEDURE — 99214 OFFICE O/P EST MOD 30 MIN: CPT | Performed by: NURSE PRACTITIONER

## 2020-10-08 RX ORDER — LISINOPRIL 40 MG/1
40 TABLET ORAL AT BEDTIME
Qty: 90 TABLET | Refills: 3 | Status: SHIPPED | OUTPATIENT
Start: 2020-10-08

## 2020-10-08 RX ORDER — ROSUVASTATIN CALCIUM 40 MG/1
40 TABLET, COATED ORAL DAILY
Qty: 90 TABLET | Refills: 3 | Status: SHIPPED | OUTPATIENT
Start: 2020-10-08

## 2020-10-08 RX ORDER — METOPROLOL TARTRATE 25 MG/1
25 TABLET, FILM COATED ORAL 2 TIMES DAILY
Qty: 180 TABLET | Refills: 3 | Status: SHIPPED | OUTPATIENT
Start: 2020-10-08

## 2020-10-08 ASSESSMENT — MIFFLIN-ST. JEOR: SCORE: 2057.3

## 2020-10-08 NOTE — LETTER
10/8/2020    Oseas Correa MD  [facility] [address]  [facility] [address]    RE: Darryl Uribe       Dear Colleague,    I had the pleasure of seeing Darryl Uribe in the [facility].    Cardiology Clinic Progress Note    Service Date: October 8, 2020    PRIMARY CARDIOLOGIST: Dr. Amaya    seeing  REASON FOR VISIT: Annual follow up    HPI:   I had the pleasure of meeting Mr. Darryl Uribe in the clinic today.  He is a very pleasant 60 year old male with a past medical history notable for hypertension, hyperlipidemia, and a very strong family history of coronary artery disease with both his mother and father having heart attacks in their 60s.  He is a lifelong nonsmoker. He has morbid obesity and known coronary artery disease with stenting of his right coronary in 2016 after a non-ST segment elevation myocardial infarction. He received a second stent in his left anterior descending artery in 08/2018 again for an acute coronary syndrome. Darryl has followed with Dr. Amaya for a number of years for his cardiology care.    He last met with Dr. Amaya in August 2019. He was doing quite well from a cardiac standpoint at that time. He had been working on exercising more regularly and had lost around 15 pounds.    Today, he presents to the clinic for a routine annual follow up.  He tells me that he has struggled with low back pain and some depression intermittently over the past year which has really set him back as far as exercising.  As a result, he has unfortunately gained a lot of weight back. He is up to 284 pounds on our scale today with a BMI of 44, up around 30 pounds from last year.  He is quite ashamed about this and is motivated to try to get things back on track.  He has been seeing a chiropractor of late and feels that he finally has his back pain under better control. He has started trying to do a walking routine more regularly up to around 3 miles a  few times a week. He has been tolerating this well without exertional symptoms. He specifically denies any issues with chest pain, palpitations, dizziness, shortness of breath, orthopnea, or significant lower extremity edema.    He has been checking his blood pressures fairly regularly at home getting readings consistently around the 120s/80s. His blood pressure is mildly elevated in the clinic today at 144/89, although he states that this is likely because he was running a bit late and was admittedly somewhat anxious to discuss his weight gain over the past year. I personally rechecked his blood pressure with result of 135/77.  He recently had labs checked at his primary care physician's office with his LDL cholesterol unsurprisingly trending up slightly to 78.  He has been on atorvastatin 40 mg daily and has been tolerating this well. We talked about trying to push his LDL cholesterol lower at least under 70 or further given his history and risk factors.    ASSESSMENT AND PLAN:  1. Coronary artery disease  - Status post stenting of the RCA in 2016 and LAD in 2018.  - Stable without symptoms concerning for arrhythmia, heart failure, or ischemia.  - On a good regimen of aspirin, statin, beta-blocker, and ACE inhibitor. Counseled on lifestyle modifications including trying to get more regular exercise and stick to a heart healthy diet.    2. Hypertension  - Blood pressure is mildly elevated in the clinic today but reportedly well controlled and his checks at home. I made no changes to his regimen today but encouraged him to continue to monitor his blood pressures at home and call if they begin to trend higher than 130/85.    3. Hyperlipidemia  - LDL cholesterol has trended up to 78 on a recent check at his PCP, unsurprisingly with his weight gain over the past year. He was agreeable to stopping atorvastatin and switching to rosuvastatin 40 mg once daily to try to push his LDL at least under 70 or further. We will  plan to repeat a fasting lipid profile ALT in the next 1 to 2 months.    4. Morbid obesity   - As described above, the majority of our conversation today was spent on counseling as the patient was quite disappointed in himself for the setback that he has had with significant weight gain of around 30 pounds over the past year. We discussed the option of enrolling in the WEL program to help with goalsetting and accountability. When the colder weather comes, he does have a gym that he is planning to join and he is hoping to continue make progress with his walking routine until then.    Thank you for the opportunity to participate in this pleasant patient's care.  We will follow-up with him by phone on the results of his repeat lipid profile and any further recommendations pending these results. He will otherwise see Dr. Amaya in follow up in 1 year. We would be happy to see him sooner if needed for any concerns in the meantime.      ZACHERY Rowland, CNP   Text Page  (8am - 5pm, M-F)    Orders this Visit:  No orders of the defined types were placed in this encounter.    No orders of the defined types were placed in this encounter.    There are no discontinued medications.  Encounter Diagnoses   Name Primary?     Coronary artery disease involving native coronary artery of native heart without angina pectoris Yes     Benign essential hypertension      Mixed hyperlipidemia      Morbid obesity (H)      CURRENT MEDICATIONS:  Current Outpatient Medications   Medication Sig Dispense Refill     ASPIRIN PO Take 81 mg by mouth every morning        atorvastatin (LIPITOR) 40 MG tablet Take 1 tablet (40 mg) by mouth daily 30 tablet 0     lisinopril (PRINIVIL/ZESTRIL) 40 MG tablet Take 1 tablet (40 mg) by mouth At Bedtime 30 tablet 0     metoprolol tartrate (LOPRESSOR) 25 MG tablet Take 1 tablet (25 mg) by mouth 2 times daily 180 tablet 3     nitroGLYcerin (NITROSTAT) 0.4 MG sublingual tablet For chest pain place 1 tablet under  the tongue every 5 minutes for 3 doses. If symptoms persist 5 minutes after 1st dose call 911. 95 tablet 1     omeprazole (PRILOSEC) 20 MG CR capsule Take 20 mg by mouth 2 times daily       venlafaxine (EFFEXOR) 37.5 MG tablet Take 37.5 mg by mouth 2 times daily       VITAMIN D, CHOLECALCIFEROL, PO Take 400 Units by mouth daily       ALLERGIES  No Known Allergies    PAST MEDICAL, SURGICAL, FAMILY HISTORY:  History was reviewed and updated as needed, see medical record.    SOCIAL HISTORY:  Social History     Socioeconomic History     Marital status:      Spouse name: Not on file     Number of children: Not on file     Years of education: Not on file     Highest education level: Not on file   Occupational History     Not on file   Social Needs     Financial resource strain: Not on file     Food insecurity     Worry: Not on file     Inability: Not on file     Transportation needs     Medical: Not on file     Non-medical: Not on file   Tobacco Use     Smoking status: Never Smoker     Smokeless tobacco: Never Used   Substance and Sexual Activity     Alcohol use: No     Comment: 2 drinks per year if that     Drug use: No     Sexual activity: Never   Lifestyle     Physical activity     Days per week: Not on file     Minutes per session: Not on file     Stress: Not on file   Relationships     Social connections     Talks on phone: Not on file     Gets together: Not on file     Attends Yazidism service: Not on file     Active member of club or organization: Not on file     Attends meetings of clubs or organizations: Not on file     Relationship status: Not on file     Intimate partner violence     Fear of current or ex partner: Not on file     Emotionally abused: Not on file     Physically abused: Not on file     Forced sexual activity: Not on file   Other Topics Concern     Parent/sibling w/ CABG, MI or angioplasty before 65F 55M? Yes   Social History Narrative     Not on file     Review of Systems:  Skin:  Negative  "    Eyes:  Positive for glasses  ENT:  Negative    Respiratory:  Positive for sleep apnea;CPAP  Cardiovascular:  Negative    Gastroenterology: Negative    Genitourinary:  Negative    Musculoskeletal:  Positive for back pain  Neurologic:  Negative    Psychiatric:  Negative    Heme/Lymph/Imm:  Negative    Endocrine:  Negative       Physical Exam:  Vitals: BP (!) 144/89 (BP Location: Right arm, Patient Position: Sitting, Cuff Size: Adult Large)   Pulse 67   Ht 1.702 m (5' 7\")   Wt 128.9 kg (284 lb 1.6 oz)   BMI 44.50 kg/m     Wt Readings from Last 4 Encounters:   10/08/20 128.9 kg (284 lb 1.6 oz)   08/28/19 116 kg (255 lb 11.2 oz)   10/22/18 123.2 kg (271 lb 8 oz)   08/06/18 118.9 kg (262 lb 3.2 oz)     CONSTITUTIONAL: Pleasant, obese gentleman in no acute distress.  HEENT: Pupils equal, round. Sclerae nonicteric.    NECK: Supple, thick.  Unable to assess JVP due to body habitus.  C/V:  Regular rate and rhythm, normal S1 and S2, no S3 or S4, no murmur, rub or gallop.  RESP: Respirations are unlabored. Lungs are clear to auscultation bilaterally without wheezing, rales, or rhonchi.  EXTREM: No clubbing, cyanosis, or lower extremity edema bilaterally.   NEURO: Alert and oriented, cooperative. Gait steady. No gross focal deficits.   PSYCH: Affect appropriate. Mentation normal. Responds to questions appropriately.  SKIN: Warm and dry.    Recent Lab Results:  LIPID RESULTS:  Lab Results   Component Value Date    CHOL 134 08/28/2019    HDL 43 08/28/2019    LDL 50 08/28/2019    TRIG 204 (H) 08/28/2019    CHOLHDLRATIO 2.6 08/11/2009     LIVER ENZYME RESULTS:  Lab Results   Component Value Date    AST 27 10/17/2017    ALT 32 08/28/2019     CBC RESULTS:  Lab Results   Component Value Date    WBC 5.4 08/06/2018    RBC 4.42 08/06/2018    HGB 13.2 (L) 08/06/2018    HCT 38.8 (L) 08/06/2018    MCV 88 08/06/2018    MCH 29.9 08/06/2018    MCHC 34.0 08/06/2018    RDW 12.7 08/06/2018     08/06/2018     BMP RESULTS:  Lab " Results   Component Value Date     08/28/2019    POTASSIUM 4.1 08/28/2019    CHLORIDE 113 (H) 08/28/2019    CO2 23 08/28/2019    ANIONGAP 4 08/28/2019     (H) 08/28/2019    BUN 21 08/28/2019    CR 0.75 08/28/2019    GFRESTIMATED >90 08/28/2019    GFRESTBLACK >90 08/28/2019    DYANA 9.2 08/28/2019      A1C RESULTS:  Lab Results   Component Value Date    A1C 5.8 06/14/2017     INR RESULTS:  Lab Results   Component Value Date    INR 1.09 07/25/2018    INR Canceled, Test credited 07/25/2018       This note was completed in part using Dragon voice recognition software. Although reviewed after completion, some word and grammatical errors may occur.      Thank you for allowing me to participate in the care of your patient.    Sincerely,     Devin Cruz NP     [facility]

## 2020-11-04 DIAGNOSIS — I25.10 CORONARY ARTERY DISEASE INVOLVING NATIVE CORONARY ARTERY OF NATIVE HEART WITHOUT ANGINA PECTORIS: ICD-10-CM

## 2020-11-04 DIAGNOSIS — E66.01 MORBID OBESITY (H): ICD-10-CM

## 2020-11-04 DIAGNOSIS — E78.2 MIXED HYPERLIPIDEMIA: ICD-10-CM

## 2020-11-04 LAB
ALT SERPL W P-5'-P-CCNC: 48 U/L (ref 0–70)
CHOLEST SERPL-MCNC: 165 MG/DL
HDLC SERPL-MCNC: 49 MG/DL
LDLC SERPL CALC-MCNC: 66 MG/DL
NONHDLC SERPL-MCNC: 116 MG/DL
TRIGL SERPL-MCNC: 251 MG/DL

## 2020-11-04 PROCEDURE — 84460 ALANINE AMINO (ALT) (SGPT): CPT | Performed by: NURSE PRACTITIONER

## 2020-11-04 PROCEDURE — 36415 COLL VENOUS BLD VENIPUNCTURE: CPT | Performed by: NURSE PRACTITIONER

## 2020-11-04 PROCEDURE — 80061 LIPID PANEL: CPT | Performed by: NURSE PRACTITIONER

## 2020-11-12 ENCOUNTER — TELEPHONE (OUTPATIENT)
Dept: CARDIOLOGY | Facility: CLINIC | Age: 60
End: 2020-11-12

## 2020-11-12 DIAGNOSIS — E78.2 MIXED HYPERLIPIDEMIA: Primary | ICD-10-CM

## 2020-11-12 RX ORDER — ICOSAPENT ETHYL 1 G/1
2 CAPSULE ORAL 2 TIMES DAILY WITH MEALS
Qty: 180 CAPSULE | Refills: 1 | Status: SHIPPED | OUTPATIENT
Start: 2020-11-12 | End: 2020-11-16

## 2020-11-12 NOTE — TELEPHONE ENCOUNTER
Patient notified of result of lipid profile and recommendation to start Vascepa 2g twice daily for elevated Triglycerides. Follow up lipid profile in 1-2 months.  Orders placed in Epic         Attempted to reach patient to update him on the results of his labs from last week. I was unable to reach him but left a message requesting that he call back for an update. His cholesterol levels overall look improved with the switch to rosuvastatin 40 mg daily with his LDL now at goal below 70. His triglycerides are elevated.  Given his history of CAD and prior PCI, would recommend starting icosapent ethyl 2 g twice daily with meals to help manage his triglyceride levels and reduce his risk for CV events. He should continue to work on sticking to a heart healthy diet and trying to get regular exercise to lose weight and help with this as well. Repeat a fasting lipid panel in 1-2 months after starting icosapent ethyl and follow up as planned with Dr. Amaya for an annual visit around October 2021.  Thank you!

## 2020-11-12 NOTE — TELEPHONE ENCOUNTER
Patient reached out to his [facility] pharmacy and his insurance recommends a PA for Vascepa.  Reached out to Sveta [phone] and recommend a contact the Prior Auth and if denied she would write an appeal.  Contacted patient with update of the PA process. May use [facility] pharmacy on [address] in [place]

## 2020-11-13 ENCOUNTER — TELEPHONE (OUTPATIENT)
Dept: CARDIOLOGY | Facility: CLINIC | Age: 60
End: 2020-11-13

## 2020-11-13 DIAGNOSIS — E78.2 MIXED HYPERLIPIDEMIA: ICD-10-CM

## 2020-11-13 NOTE — TELEPHONE ENCOUNTER
Prior Authorization Approval    Authorization Effective Date: 11/13/2020  Authorization Expiration Date: 11/13/2021  Medication: Icosapent Ethyl (VASCEPA)1 g CAPS-PA approved  Approved Dose/Quantity:   Reference #:     Insurance Company: [employer] - Phone [phone] Fax [phone]  Expected CoPay:       CoPay Card Available:      Foundation Assistance Needed:    Which Pharmacy is filling the prescription (Not needed for infusion/clinic administered): [facility] - [place], [place] - [facility] [address] AT PORTAL TO REGISTERED [facility]  Pharmacy Notified: Yes  Patient Notified: No-Pharmacy will contact

## 2020-11-13 NOTE — TELEPHONE ENCOUNTER
Request was put in chart under Results encounter    Routing comment       Reyna Song, RN Yesterday (1:43 PM)        Patient reached out to his [facility] pharmacy and his insurance recommends a PA for Vascepa.  Reached out to Sveta [phone] and recommend a contact the Prior Auth and if denied she would write an appeal.  Contacted patient with update of the PA process. May use [facility] pharmacy on [address] in [place]

## 2020-11-13 NOTE — TELEPHONE ENCOUNTER
Central Prior Authorization Team   Phone: [phone]      PA Initiation    Medication: Icosapent Ethyl (VASCEPA)1 g CAPS-PA initiated  Insurance Company: [employer] - Phone [phone] Fax [phone]  Pharmacy Filling the Rx: [facility] - [place], [place] - [address] AT PORTAL TO REGISTERED [facility] SITES  Filling Pharmacy Phone: [phone]  Filling Pharmacy Fax:    Start Date: 11/13/2020

## 2020-11-16 RX ORDER — ICOSAPENT ETHYL 1 G/1
2 CAPSULE ORAL 2 TIMES DAILY WITH MEALS
Qty: 360 CAPSULE | Refills: 3 | Status: SHIPPED | OUTPATIENT
Start: 2020-11-16 | End: 2024-01-03

## 2020-11-16 NOTE — TELEPHONE ENCOUNTER
Received a call from, [facility] moments after leaving patient voicemail.  They told me that the generic for Vascepa is out of stock. The brand copay would be 150.00 for 3 months and the generic is 50.00 for 3 months.  I called patient back and left  Him a voicemail with this information and asked what he would like for us to do in terms of filling this prescription.  The reference number is # [id number].

## 2020-11-16 NOTE — TELEPHONE ENCOUNTER
Left voicemail with patient telling him that his Vascepa was approved and that a prescription was sent to [facility] for this on 11/11.  I asked that he call us if he has any questions.

## 2020-11-16 NOTE — TELEPHONE ENCOUNTER
Patient returned call and I explained the situation. His preference is to go with generic at 50.00 for 3 months. He would like this sent to [facility] in [place]. I will send this in now.

## 2020-12-09 ENCOUNTER — TELEPHONE (OUTPATIENT)
Dept: CARDIOLOGY | Facility: CLINIC | Age: 60
End: 2020-12-09

## 2020-12-14 NOTE — TELEPHONE ENCOUNTER
Voice message received from Darryl, stating he did stop taking the Icosapent Ethyl last week Wednesday, and the smell from his ileostomy bag did go away. Would like to stay off the medication.    Will update Javier Cruz for any recommendations.

## 2020-12-22 NOTE — TELEPHONE ENCOUNTER
Voice message received. 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 Would like it fax to Josiah Simon at fax # [phone].    History of NSTEMI. Primary cardiologist is Dr. Amaya (last seen 8/28/2019, and Javier Cruz, RIVERA (last seen 10/8/20).     Message sent to Javier Cruz for recommendations.

## 2020-12-22 NOTE — TELEPHONE ENCOUNTER
He could consider trying lovaza, although this is also contains omega-3 fatty acids and may unfortunately have the same effect with the foul smell with his ileostomy bag. For now I would just recommend continuing with rosuvastatin and trying to stick to a heart healthy diet with regular exercise for management of his triglycerides.    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    Thank you!    Javier

## 2020-12-23 ENCOUNTER — TELEPHONE (OUTPATIENT)
Dept: CARDIOLOGY | Facility: CLINIC | Age: 60
End: 2020-12-23

## 2020-12-23 NOTE — TELEPHONE ENCOUNTER
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 Would like it fax to Josiah Simon at fax # [phone].

## 2020-12-23 NOTE — LETTER
[facility]  [id number]  [facility] [address]  Phone: [phone]  Fax: [phone]         December 23, 2020        Darryl Uribe  [address] [facility] [address]        To Whom It May Concern:      Mr. Uribe is under my care at the [facility]. I saw him last on 10-8-2020 at the [facility].      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  If you have any questions or concerns relating to his cardiac status I would be happy to talk with you.  I can be reached at [phone].      Respectfully,      ZACHERY Rowland, CNP  Cardiology Nurse Practitioner  [facility]

## 2021-01-05 NOTE — TELEPHONE ENCOUNTER
Patient called in to say that the [employer] physician had not received the letter from Javier that was faxed on 12/23-We verified the fax numbers. I re faxed the letter to [phone].

## 2021-01-15 ENCOUNTER — HEALTH MAINTENANCE LETTER (OUTPATIENT)
Age: 61
End: 2021-01-15

## 2021-01-21 DIAGNOSIS — E78.2 MIXED HYPERLIPIDEMIA: ICD-10-CM

## 2021-01-21 LAB
ALT SERPL W P-5'-P-CCNC: 53 U/L (ref 0–70)
CHOLEST SERPL-MCNC: 154 MG/DL
HDLC SERPL-MCNC: 56 MG/DL
LDLC SERPL CALC-MCNC: 71 MG/DL
NONHDLC SERPL-MCNC: 98 MG/DL
TRIGL SERPL-MCNC: 135 MG/DL

## 2021-01-21 PROCEDURE — 36415 COLL VENOUS BLD VENIPUNCTURE: CPT | Performed by: NURSE PRACTITIONER

## 2021-01-21 PROCEDURE — 84460 ALANINE AMINO (ALT) (SGPT): CPT | Performed by: NURSE PRACTITIONER

## 2021-01-21 PROCEDURE — 80061 LIPID PANEL: CPT | Performed by: NURSE PRACTITIONER

## 2021-01-22 ENCOUNTER — TELEPHONE (OUTPATIENT)
Dept: CARDIOLOGY | Facility: CLINIC | Age: 61
End: 2021-01-22

## 2021-01-22 NOTE — TELEPHONE ENCOUNTER
----- Message from Devin Cruz NP sent at 1/22/2021  1:20 PM CST -----  Please update Mr. Uribe that his labs yesterday look good with improvement in his triglycerides. He should continue with his current medications and trying to stick to a heart healthy diet and get regular exercise. He can follow-up with Dr. Amaya for an annual visit next fall in October 2021 or sooner if needed for concerns in the meantime.  Thank you! Javier Cruz, APRN, CNP

## 2021-01-22 NOTE — TELEPHONE ENCOUNTER
Left message with patient that his TG have improved significantly probably to his lifestyle measures. I told him he could call us to discuss further and that I would release the results to [facility].    I told him to continue with Crestor 40 MG daily.

## 2021-01-22 NOTE — TELEPHONE ENCOUNTER
Patient returned call and I reviewed his lipids pointing out that he had a nice drop in his TG down to 135. He states that he just got a new job and is doing more walking. Hasn't made any dietary changes.    Advised him to continue with Crestor 40 MG. He asked if his PCP can refill his statin along the way and I told him that this is fine with us.    He has a new mail order pharmacy: [facility] at [phone].    I congratulated him on his efforts. He had no other questions for me.

## 2021-09-04 ENCOUNTER — HEALTH MAINTENANCE LETTER (OUTPATIENT)
Age: 61
End: 2021-09-04

## 2022-02-19 ENCOUNTER — HEALTH MAINTENANCE LETTER (OUTPATIENT)
Age: 62
End: 2022-02-19

## 2022-10-16 ENCOUNTER — HEALTH MAINTENANCE LETTER (OUTPATIENT)
Age: 62
End: 2022-10-16

## 2023-03-26 ENCOUNTER — HEALTH MAINTENANCE LETTER (OUTPATIENT)
Age: 63
End: 2023-03-26

## 2024-01-03 ENCOUNTER — OFFICE VISIT (OUTPATIENT)
Dept: CARDIOLOGY | Facility: CLINIC | Age: 64
End: 2024-01-03
Payer: COMMERCIAL

## 2024-01-03 ENCOUNTER — TELEPHONE (OUTPATIENT)
Dept: CARDIOLOGY | Facility: CLINIC | Age: 64
End: 2024-01-03

## 2024-01-03 VITALS
DIASTOLIC BLOOD PRESSURE: 76 MMHG | OXYGEN SATURATION: 95 % | BODY MASS INDEX: 48.18 KG/M2 | HEART RATE: 60 BPM | SYSTOLIC BLOOD PRESSURE: 138 MMHG | WEIGHT: 307 LBS | HEIGHT: 67 IN

## 2024-01-03 DIAGNOSIS — I25.10 CORONARY ARTERY DISEASE INVOLVING NATIVE CORONARY ARTERY OF NATIVE HEART WITHOUT ANGINA PECTORIS: ICD-10-CM

## 2024-01-03 DIAGNOSIS — I21.4 NSTEMI (NON-ST ELEVATED MYOCARDIAL INFARCTION) (H): ICD-10-CM

## 2024-01-03 DIAGNOSIS — I20.0 UNSTABLE ANGINA (H): Primary | ICD-10-CM

## 2024-01-03 PROCEDURE — 93000 ELECTROCARDIOGRAM COMPLETE: CPT | Performed by: INTERNAL MEDICINE

## 2024-01-03 PROCEDURE — 99204 OFFICE O/P NEW MOD 45 MIN: CPT | Performed by: INTERNAL MEDICINE

## 2024-01-03 RX ORDER — ATORVASTATIN CALCIUM 10 MG/1
1 TABLET, FILM COATED ORAL EVERY 24 HOURS
COMMUNITY
End: 2024-01-03

## 2024-01-03 RX ORDER — TRAZODONE HYDROCHLORIDE 50 MG/1
1 TABLET, FILM COATED ORAL PRN
COMMUNITY
Start: 2023-08-08

## 2024-01-03 RX ORDER — SODIUM CHLORIDE 9 MG/ML
INJECTION, SOLUTION INTRAVENOUS CONTINUOUS
Status: CANCELLED | OUTPATIENT
Start: 2024-01-03

## 2024-01-03 RX ORDER — ESCITALOPRAM OXALATE 20 MG/1
1 TABLET ORAL EVERY MORNING
COMMUNITY
Start: 2022-01-01

## 2024-01-03 RX ORDER — ASPIRIN 81 MG/1
243 TABLET, CHEWABLE ORAL ONCE
Status: CANCELLED | OUTPATIENT
Start: 2024-01-03

## 2024-01-03 RX ORDER — LIDOCAINE 40 MG/G
CREAM TOPICAL
Status: CANCELLED | OUTPATIENT
Start: 2024-01-03

## 2024-01-03 RX ORDER — POTASSIUM CHLORIDE 1500 MG/1
20 TABLET, EXTENDED RELEASE ORAL
Status: CANCELLED | OUTPATIENT
Start: 2024-01-03

## 2024-01-03 RX ORDER — ISOSORBIDE MONONITRATE 30 MG/1
30 TABLET, EXTENDED RELEASE ORAL DAILY
Qty: 30 TABLET | Refills: 3 | Status: SHIPPED | OUTPATIENT
Start: 2024-01-03

## 2024-01-03 RX ORDER — MULTIVITAMIN
1 TABLET ORAL DAILY
COMMUNITY
Start: 2023-02-28

## 2024-01-03 RX ORDER — ASPIRIN 325 MG
325 TABLET ORAL ONCE
Status: CANCELLED | OUTPATIENT
Start: 2024-01-03 | End: 2024-01-03

## 2024-01-03 NOTE — PROGRESS NOTES
HPI and Plan:   This is a very nice 63-year-old gentleman returns for cardiac evaluation today as he has been having chest pains.    He has known coronary artery disease and his excellent medical records written by my colleagues were reviewed.    He is a very pleasant 63year old male with a past medical history notable for hypertension, hyperlipidemia, and a very strong family history of coronary artery disease with both his mother and father having heart attacks in their 60s.  He is a lifelong nonsmoker. He has morbid obesity and known coronary artery disease with stenting of his right coronary in 2016 after a non-ST segment elevation myocardial infarction. He received a second stent in his left anterior descending artery in 08/2018 again for an acute coronary syndrome. Darryl has followed with Dr. Amaya for a number of years for his cardiology care.     We have not seen him since 2020 and she has been feeling fine.  For the past year he has been having occasional episodes of short duration chest discomfort which did not bother him.  However about 2 weeks ago he was walking in the mall when he experienced sudden onset of severe substernal chest discomfort which stopped him in his tracks.  He says it was like somebody putting a band around his chest.  He went back to his car and sat for 2 hours and then the pain subsided.  He did not seek medical attention.  Since that time he has had chest pains on exertion though not as long-lasting.  He has not had chest pain at rest.    Has been compliant with his medications.    His cardiac examination is unremarkable.  He does have colostomy bag in place as he has had colectomy for ulcerative colitis    I am happy to see that his EKG today is unremarkable I was afraid that with his 2 hours of chest discomfort 2 weeks ago he might have had a significant infarct.    This gentleman with known coronary artery disease, on high-dose Crestor metoprolol aspirin and 40 mg of  lisinopril has clear history of unstable angina.    Coronary angiography with a view towards revascularization would certainly be most appropriate.    The risks of the procedure, including but not limited to MI, CVA, peripheral vascular injury, aortic dissection, renal injury were explained in detail to the patient.  He understands that should be fine lesion suitable for percutaneous intervention we will do it at the same setting.    Hopefully we can do the procedure this week.  In the meantime I prescribed him oral nitrates to take.  I advised him to seek medical attention immediately should he experience the symptoms he had that 2 weeks ago.    It is my pleasure to be involved in this very nice gentleman's care.    Orders Placed This Encounter   Procedures    EKG 12-lead complete w/read - Clinics (performed today)    Case Request Cath Lab: Coronary Angiogram, Percutaneous Coronary Intervention       Orders Placed This Encounter   Medications    DISCONTD: atorvastatin (LIPITOR) 10 MG tablet     Sig: Take 1 tablet by mouth every 24 hours    escitalopram (LEXAPRO) 20 MG tablet     Sig: Take 1 tablet by mouth every morning    Multiple Vitamin (ONE-A-DAY ESSENTIAL) TABS     Sig: Take 1 tablet by mouth daily    traZODone (DESYREL) 50 MG tablet     Sig: Take 1 tablet by mouth as needed    isosorbide mononitrate (IMDUR) 30 MG 24 hr tablet     Sig: Take 1 tablet (30 mg) by mouth daily     Dispense:  30 tablet     Refill:  3       Encounter Diagnosis   Name Primary?    Unstable angina (H) Yes       CURRENT MEDICATIONS:  Current Outpatient Medications   Medication Sig Dispense Refill    ASPIRIN PO Take 81 mg by mouth every morning       escitalopram (LEXAPRO) 20 MG tablet Take 1 tablet by mouth every morning      isosorbide mononitrate (IMDUR) 30 MG 24 hr tablet Take 1 tablet (30 mg) by mouth daily 30 tablet 3    lisinopril (ZESTRIL) 40 MG tablet Take 1 tablet (40 mg) by mouth At Bedtime 90 tablet 3    metoprolol tartrate  (LOPRESSOR) 25 MG tablet Take 1 tablet (25 mg) by mouth 2 times daily 180 tablet 3    Multiple Vitamin (ONE-A-DAY ESSENTIAL) TABS Take 1 tablet by mouth daily      nitroGLYcerin (NITROSTAT) 0.4 MG sublingual tablet For chest pain place 1 tablet under the tongue every 5 minutes for 3 doses. If symptoms persist 5 minutes after 1st dose call 911. 25 tablet 1    omeprazole (PRILOSEC) 20 MG CR capsule Take 20 mg by mouth 2 times daily      rosuvastatin (CRESTOR) 40 MG tablet Take 1 tablet (40 mg) by mouth daily 90 tablet 3    traZODone (DESYREL) 50 MG tablet Take 1 tablet by mouth as needed         ALLERGIES   No Known Allergies    PAST MEDICAL HISTORY:  Past Medical History:   Diagnosis Date    Anxiety     CAD (coronary artery disease)     cardiac cath 7/2017: ADELFO to LAD; NonSTEMI + cath with ADELFO to RCA 6/2017    Hyperlipemia     Hypertension     Ulcerative colitis (H)        PAST SURGICAL HISTORY:  Past Surgical History:   Procedure Laterality Date    OTHER SURGICAL HISTORY  06/15/2017    cardiac cath: ADELFO to RCA    OTHER SURGICAL HISTORY  07/27/2018    cardiac cath: ADELFO to LAD       FAMILY HISTORY:  Family History   Problem Relation Age of Onset    Myocardial Infarction Mother        SOCIAL HISTORY:  Social History     Socioeconomic History    Marital status:      Spouse name: None    Number of children: None    Years of education: None    Highest education level: None   Tobacco Use    Smoking status: Never    Smokeless tobacco: Never   Substance and Sexual Activity    Alcohol use: No     Comment: 2 drinks per year if that    Drug use: No    Sexual activity: Never   Other Topics Concern    Parent/sibling w/ CABG, MI or angioplasty before 65F 55M? Yes       Review of Systems:  Skin:        Eyes:       ENT:       Respiratory:  Positive for shortness of breath;dyspnea on exertion;sleep apnea;CPAP  Cardiovascular:    Positive for;chest pain;dizziness  Gastroenterology:      Genitourinary:       Musculoskeletal:      "  Neurologic:       Psychiatric:       Heme/Lymph/Imm:       Endocrine:         Physical Exam:  Vitals: /76 (BP Location: Right arm, Patient Position: Sitting, Cuff Size: Adult Large)   Pulse 60   Ht 1.702 m (5' 7\")   Wt 139.3 kg (307 lb)   SpO2 95%   BMI 48.08 kg/m      Constitutional:  cooperative, alert and oriented, well developed, well nourished, in no acute distress obese      Skin:  warm and dry to the touch, no apparent skin lesions or masses noted          Head:  normocephalic, no masses or lesions        Eyes:  conjunctivae and lids unremarkable        Lymph:No Cervical lymphadenopathy present     ENT:  no pallor or cyanosis, dentition good        Neck:  carotid pulses are full and equal bilaterally;no carotid bruit        Respiratory:  normal breath sounds, clear to auscultation, normal A-P diameter, normal symmetry, normal respiratory excursion, no use of accessory muscles         Cardiac: regular rhythm;normal S1 and S2;no S3 or S4;no murmurs, gallops or rubs detected                pulses full and equal                                        GI:  abdomen soft obese      Extremities and Muscular Skeletal:  no edema;no spinal abnormalities noted;normal muscle strength and tone              Neurological:  no gross motor deficits        Psych:  affect appropriate, oriented to time, person and place        Recent Lab Results:  LIPID RESULTS:  Lab Results   Component Value Date    CHOL 154 01/21/2021    HDL 56 01/21/2021    LDL 71 01/21/2021    TRIG 135 01/21/2021    CHOLHDLRATIO 2.6 08/11/2009       LIVER ENZYME RESULTS:  Lab Results   Component Value Date    AST 27 10/17/2017    ALT 53 01/21/2021       CBC RESULTS:  Lab Results   Component Value Date    WBC 5.4 08/06/2018    RBC 4.42 08/06/2018    HGB 13.2 (L) 08/06/2018    HCT 38.8 (L) 08/06/2018    MCV 88 08/06/2018    MCH 29.9 08/06/2018    MCHC 34.0 08/06/2018    RDW 12.7 08/06/2018     08/06/2018       BMP RESULTS:  Lab Results "   Component Value Date     08/28/2019    POTASSIUM 4.1 08/28/2019    CHLORIDE 113 (H) 08/28/2019    CO2 23 08/28/2019    ANIONGAP 4 08/28/2019     (H) 08/28/2019    BUN 21 08/28/2019    CR 0.75 08/28/2019    GFRESTIMATED >90 08/28/2019    GFRESTBLACK >90 08/28/2019    DYANA 9.2 08/28/2019        A1C RESULTS:  Lab Results   Component Value Date    A1C 5.8 06/14/2017       INR RESULTS:  Lab Results   Component Value Date    INR 1.09 07/25/2018    INR Canceled, Test credited 07/25/2018           CC  No referring provider defined for this encounter.

## 2024-01-03 NOTE — TELEPHONE ENCOUNTER
Team received phone call from Darryl, asking about Isosorbide prescription that he received today.    Darryl wonders if this is a long term medication, or if he will just be on it temporarily.  Explained that we won't really know until he has the angiogram.  He definitely needs to take if now, and he understands that.      We mutually agreed to revisit this after the angiogram.  Darryl wishes to be on the very least medications that are needed, but is agreeable to taking this if he must.    Writer informed him that I will be calling him a couple days in advance of angiogram to review final checklist.  Darryl verbalized understanding that he can call us or send a MyChart anytime he has questions\concerns.    Soraida Lujan RN on 1/3/2024 at 11:23 AM

## 2024-01-03 NOTE — LETTER
1/3/2024    Oseas Correa MD  [facility] [address]  [facility] [address]    RE: Darryl KERRI Uribe       Dear Colleague,     I had the pleasure of seeing Darryl Uribe in the [facility].  HPI and Plan:   This is a very nice 63-year-old gentleman returns for cardiac evaluation today as he has been having chest pains.    He has known coronary artery disease and his excellent medical records written by my colleagues were reviewed.    He is a very pleasant 63year old male with a past medical history notable for hypertension, hyperlipidemia, and a very strong family history of coronary artery disease with both his mother and father having heart attacks in their 60s.  He is a lifelong nonsmoker. He has morbid obesity and known coronary artery disease with stenting of his right coronary in 2016 after a non-ST segment elevation myocardial infarction. He received a second stent in his left anterior descending artery in 08/2018 again for an acute coronary syndrome. Darryl has followed with Dr. Amaya for a number of years for his cardiology care.     We have not seen him since 2020 and she has been feeling fine.  For the past year he has been having occasional episodes of short duration chest discomfort which did not bother him.  However about 2 weeks ago he was walking in the mall when he experienced sudden onset of severe substernal chest discomfort which stopped him in his tracks.  He says it was like somebody putting a band around his chest.  He went back to his car and sat for 2 hours and then the pain subsided.  He did not seek medical attention.  Since that time he has had chest pains on exertion though not as long-lasting.  He has not had chest pain at rest.    Has been compliant with his medications.    His cardiac examination is unremarkable.  He does have colostomy bag in place as he has had colectomy for ulcerative colitis    I am happy to see that his EKG today is  unremarkable I was afraid that with his 2 hours of chest discomfort 2 weeks ago he might have had a significant infarct.    This gentleman with known coronary artery disease, on high-dose Crestor metoprolol aspirin and 40 mg of lisinopril has clear history of unstable angina.    Coronary angiography with a view towards revascularization would certainly be most appropriate.    The risks of the procedure, including but not limited to MI, CVA, peripheral vascular injury, aortic dissection, renal injury were explained in detail to the patient.  He understands that should be fine lesion suitable for percutaneous intervention we will do it at the same setting.    Hopefully we can do the procedure this week.  In the meantime I prescribed him oral nitrates to take.  I advised him to seek medical attention immediately should he experience the symptoms he had that 2 weeks ago.    It is my pleasure to be involved in this very nice gentleman's care.    Orders Placed This Encounter   Procedures    EKG 12-lead complete w/read - Clinics (performed today)    Case Request Cath Lab: Coronary Angiogram, Percutaneous Coronary Intervention       Orders Placed This Encounter   Medications    DISCONTD: atorvastatin (LIPITOR) 10 MG tablet     Sig: Take 1 tablet by mouth every 24 hours    escitalopram (LEXAPRO) 20 MG tablet     Sig: Take 1 tablet by mouth every morning    Multiple Vitamin (ONE-A-DAY ESSENTIAL) TABS     Sig: Take 1 tablet by mouth daily    traZODone (DESYREL) 50 MG tablet     Sig: Take 1 tablet by mouth as needed    isosorbide mononitrate (IMDUR) 30 MG 24 hr tablet     Sig: Take 1 tablet (30 mg) by mouth daily     Dispense:  30 tablet     Refill:  3       Encounter Diagnosis   Name Primary?    Unstable angina (H) Yes       CURRENT MEDICATIONS:  Current Outpatient Medications   Medication Sig Dispense Refill    ASPIRIN PO Take 81 mg by mouth every morning       escitalopram (LEXAPRO) 20 MG tablet Take 1 tablet by mouth every  morning      isosorbide mononitrate (IMDUR) 30 MG 24 hr tablet Take 1 tablet (30 mg) by mouth daily 30 tablet 3    lisinopril (ZESTRIL) 40 MG tablet Take 1 tablet (40 mg) by mouth At Bedtime 90 tablet 3    metoprolol tartrate (LOPRESSOR) 25 MG tablet Take 1 tablet (25 mg) by mouth 2 times daily 180 tablet 3    Multiple Vitamin (ONE-A-DAY ESSENTIAL) TABS Take 1 tablet by mouth daily      nitroGLYcerin (NITROSTAT) 0.4 MG sublingual tablet For chest pain place 1 tablet under the tongue every 5 minutes for 3 doses. If symptoms persist 5 minutes after 1st dose call 911. 25 tablet 1    omeprazole (PRILOSEC) 20 MG CR capsule Take 20 mg by mouth 2 times daily      rosuvastatin (CRESTOR) 40 MG tablet Take 1 tablet (40 mg) by mouth daily 90 tablet 3    traZODone (DESYREL) 50 MG tablet Take 1 tablet by mouth as needed         ALLERGIES   No Known Allergies    PAST MEDICAL HISTORY:  Past Medical History:   Diagnosis Date    Anxiety     CAD (coronary artery disease)     cardiac cath 7/2017: ADELFO to LAD; NonSTEMI + cath with ADELFO to RCA 6/2017    Hyperlipemia     Hypertension     Ulcerative colitis (H)        PAST SURGICAL HISTORY:  Past Surgical History:   Procedure Laterality Date    OTHER SURGICAL HISTORY  06/15/2017    cardiac cath: ADELFO to RCA    OTHER SURGICAL HISTORY  07/27/2018    cardiac cath: ADELFO to LAD       FAMILY HISTORY:  Family History   Problem Relation Age of Onset    Myocardial Infarction Mother        SOCIAL HISTORY:  Social History     Socioeconomic History    Marital status:      Spouse name: None    Number of children: None    Years of education: None    Highest education level: None   Tobacco Use    Smoking status: Never    Smokeless tobacco: Never   Substance and Sexual Activity    Alcohol use: No     Comment: 2 drinks per year if that    Drug use: No    Sexual activity: Never   Other Topics Concern    Parent/sibling w/ CABG, MI or angioplasty before 65F 55M? Yes       Review of Systems:  Skin:       "  Eyes:       ENT:       Respiratory:  Positive for shortness of breath;dyspnea on exertion;sleep apnea;CPAP  Cardiovascular:    Positive for;chest pain;dizziness  Gastroenterology:      Genitourinary:       Musculoskeletal:       Neurologic:       Psychiatric:       Heme/Lymph/Imm:       Endocrine:         Physical Exam:  Vitals: /76 (BP Location: Right arm, Patient Position: Sitting, Cuff Size: Adult Large)   Pulse 60   Ht 1.702 m (5' 7\")   Wt 139.3 kg (307 lb)   SpO2 95%   BMI 48.08 kg/m      Constitutional:  cooperative, alert and oriented, well developed, well nourished, in no acute distress obese      Skin:  warm and dry to the touch, no apparent skin lesions or masses noted          Head:  normocephalic, no masses or lesions        Eyes:  conjunctivae and lids unremarkable        Lymph:No Cervical lymphadenopathy present     ENT:  no pallor or cyanosis, dentition good        Neck:  carotid pulses are full and equal bilaterally;no carotid bruit        Respiratory:  normal breath sounds, clear to auscultation, normal A-P diameter, normal symmetry, normal respiratory excursion, no use of accessory muscles         Cardiac: regular rhythm;normal S1 and S2;no S3 or S4;no murmurs, gallops or rubs detected                pulses full and equal                                        GI:  abdomen soft obese      Extremities and Muscular Skeletal:  no edema;no spinal abnormalities noted;normal muscle strength and tone              Neurological:  no gross motor deficits        Psych:  affect appropriate, oriented to time, person and place        Recent Lab Results:  LIPID RESULTS:  Lab Results   Component Value Date    CHOL 154 01/21/2021    HDL 56 01/21/2021    LDL 71 01/21/2021    TRIG 135 01/21/2021    CHOLHDLRATIO 2.6 08/11/2009       LIVER ENZYME RESULTS:  Lab Results   Component Value Date    AST 27 10/17/2017    ALT 53 01/21/2021       CBC RESULTS:  Lab Results   Component Value Date    WBC 5.4 " 08/06/2018    RBC 4.42 08/06/2018    HGB 13.2 (L) 08/06/2018    HCT 38.8 (L) 08/06/2018    MCV 88 08/06/2018    MCH 29.9 08/06/2018    MCHC 34.0 08/06/2018    RDW 12.7 08/06/2018     08/06/2018       BMP RESULTS:  Lab Results   Component Value Date     08/28/2019    POTASSIUM 4.1 08/28/2019    CHLORIDE 113 (H) 08/28/2019    CO2 23 08/28/2019    ANIONGAP 4 08/28/2019     (H) 08/28/2019    BUN 21 08/28/2019    CR 0.75 08/28/2019    GFRESTIMATED >90 08/28/2019    GFRESTBLACK >90 08/28/2019    DYANA 9.2 08/28/2019        A1C RESULTS:  Lab Results   Component Value Date    A1C 5.8 06/14/2017       INR RESULTS:  Lab Results   Component Value Date    INR 1.09 07/25/2018    INR Canceled, Test credited 07/25/2018           CC  No referring provider defined for this encounter.      Thank you for allowing me to participate in the care of your patient.      Sincerely,     DR GLENNY SIEGEL MD     [facility]

## 2024-01-04 ENCOUNTER — TELEPHONE (OUTPATIENT)
Dept: CARDIOLOGY | Facility: CLINIC | Age: 64
End: 2024-01-04
Payer: COMMERCIAL

## 2024-01-04 NOTE — TELEPHONE ENCOUNTER
Patient called in to report that after a short time taking his Imdur 30 MG he developed a severe headache.    He took 4 advil and its finally subsiding this moring.    I suggested that he could take I/2 or 15 MG and to take 200-400 MG of Tylenol 1 hour before he takes Imdur.    If he wished to try this strategy I told him to wait for a few days until he is feeling better. I also advised him to not take Advil with his aspirin as this will increase bleeding risk.    I asked him to keep us informed.

## 2024-01-18 ENCOUNTER — TELEPHONE (OUTPATIENT)
Dept: CARDIOLOGY | Facility: CLINIC | Age: 64
End: 2024-01-18
Payer: COMMERCIAL

## 2024-01-18 NOTE — TELEPHONE ENCOUNTER
Coronary angiogram/PCI/Right Heart Cath prep instructions.     Patient is scheduled for a Coronary Angio w/possible PCI [facility] [place] [address] - Main Entrance of the Hospital on 1/19.  Check in time is at 10:00 and procedure to follow.    Patient instructed to remain NPO for solid foods 8 hours prior to arrival and may have clear liquids up to 2 hours prior to arrival.    Patient Patient does not require extra fluids prior to procedure.    Patient is not diabetic.    Patient is not on anticoagulation.    Patient is not on diuretics.         Patient is taking ASA 81mg daily and will take 4 tabs (324mg) the morning of the procedure.    Pt is not on a SGLT2 inhibitor.    Pt is not on a GLP-1 Agonist    Patient advised to take their other daily medications the morning of the procedure with small sips of water.     Verified patient does not have a contrast allergy.    Verified patient has someone available to drive them home from the hospital and can stay with them for 24 hours after the procedure.     Patient advised to notify care team with any new COVID like symptoms prior to procedure.    Patient will check their temperature the morning of procedure and call [facility] at [phone] if temp is >100.0.    Patient is aware of visitor policy.    Patient expresses understanding of above instructions and denies further questions at this time.  His partner will be  and care giver-Imdur 15 and 30 MG gave him bad headaches so he has been off of nitrates for about two weeks. Chest pain is stable      [facility]

## 2024-01-19 ENCOUNTER — APPOINTMENT (OUTPATIENT)
Dept: CARDIOLOGY | Facility: CLINIC | Age: 64
End: 2024-01-19
Attending: INTERNAL MEDICINE
Payer: COMMERCIAL

## 2024-01-19 ENCOUNTER — HOSPITAL ENCOUNTER (OUTPATIENT)
Facility: CLINIC | Age: 64
Discharge: HOME | End: 2024-01-19
Admitting: INTERNAL MEDICINE
Payer: COMMERCIAL

## 2024-01-19 VITALS
OXYGEN SATURATION: 96 % | HEART RATE: 66 BPM | SYSTOLIC BLOOD PRESSURE: 141 MMHG | DIASTOLIC BLOOD PRESSURE: 46 MMHG | RESPIRATION RATE: 22 BRPM | TEMPERATURE: 97.8 F

## 2024-01-19 DIAGNOSIS — I21.4 NSTEMI (NON-ST ELEVATED MYOCARDIAL INFARCTION) (H): ICD-10-CM

## 2024-01-19 DIAGNOSIS — I20.0 UNSTABLE ANGINA (H): ICD-10-CM

## 2024-01-19 DIAGNOSIS — I25.10 CORONARY ARTERY DISEASE INVOLVING NATIVE CORONARY ARTERY OF NATIVE HEART WITHOUT ANGINA PECTORIS: ICD-10-CM

## 2024-01-19 PROBLEM — Z98.890 STATUS POST CORONARY ANGIOGRAM: Status: ACTIVE | Noted: 2024-01-19

## 2024-01-19 LAB
ANION GAP SERPL CALCULATED.3IONS-SCNC: 11 MMOL/L (ref 7–15)
APTT PPP: 24 SECONDS (ref 22–38)
ATRIAL RATE - MUSE: 63 BPM
BUN SERPL-MCNC: 22.2 MG/DL (ref 8–23)
CALCIUM SERPL-MCNC: 9.3 MG/DL (ref 8.8–10.2)
CHLORIDE SERPL-SCNC: 105 MMOL/L (ref 98–107)
CREAT SERPL-MCNC: 0.95 MG/DL (ref 0.67–1.17)
DEPRECATED HCO3 PLAS-SCNC: 22 MMOL/L (ref 22–29)
DIASTOLIC BLOOD PRESSURE - MUSE: NORMAL MMHG
EGFRCR SERPLBLD CKD-EPI 2021: 90 ML/MIN/1.73M2
ERYTHROCYTE [DISTWIDTH] IN BLOOD BY AUTOMATED COUNT: 13.2 % (ref 10–15)
GLUCOSE SERPL-MCNC: 111 MG/DL (ref 70–99)
HCT VFR BLD AUTO: 40 % (ref 40–53)
HGB BLD-MCNC: 13.3 G/DL (ref 13.3–17.7)
INR PPP: 1 (ref 0.85–1.15)
INTERPRETATION ECG - MUSE: NORMAL
LVEF ECHO: NORMAL
MCH RBC QN AUTO: 30.2 PG (ref 26.5–33)
MCHC RBC AUTO-ENTMCNC: 33.3 G/DL (ref 31.5–36.5)
MCV RBC AUTO: 91 FL (ref 78–100)
P AXIS - MUSE: NORMAL DEGREES
PLATELET # BLD AUTO: 179 10E3/UL (ref 150–450)
POTASSIUM SERPL-SCNC: 4.3 MMOL/L (ref 3.4–5.3)
PR INTERVAL - MUSE: NORMAL MS
QRS DURATION - MUSE: 98 MS
QT - MUSE: 410 MS
QTC - MUSE: 419 MS
R AXIS - MUSE: 27 DEGREES
RBC # BLD AUTO: 4.41 10E6/UL (ref 4.4–5.9)
SODIUM SERPL-SCNC: 138 MMOL/L (ref 135–145)
SYSTOLIC BLOOD PRESSURE - MUSE: NORMAL MMHG
T AXIS - MUSE: 7 DEGREES
VENTRICULAR RATE- MUSE: 63 BPM
WBC # BLD AUTO: 5.1 10E3/UL (ref 4–11)

## 2024-01-19 PROCEDURE — 255N000002 HC RX 255 OP 636

## 2024-01-19 PROCEDURE — 80048 BASIC METABOLIC PNL TOTAL CA: CPT | Performed by: INTERNAL MEDICINE

## 2024-01-19 PROCEDURE — 99152 MOD SED SAME PHYS/QHP 5/>YRS: CPT | Performed by: INTERNAL MEDICINE

## 2024-01-19 PROCEDURE — 250N000011 HC RX IP 250 OP 636: Performed by: INTERNAL MEDICINE

## 2024-01-19 PROCEDURE — C1887 CATHETER, GUIDING: HCPCS | Performed by: INTERNAL MEDICINE

## 2024-01-19 PROCEDURE — 93306 TTE W/DOPPLER COMPLETE: CPT | Mod: 26 | Performed by: INTERNAL MEDICINE

## 2024-01-19 PROCEDURE — 99153 MOD SED SAME PHYS/QHP EA: CPT | Performed by: INTERNAL MEDICINE

## 2024-01-19 PROCEDURE — 36415 COLL VENOUS BLD VENIPUNCTURE: CPT | Performed by: INTERNAL MEDICINE

## 2024-01-19 PROCEDURE — 93799 UNLISTED CV SVC/PROCEDURE: CPT | Performed by: INTERNAL MEDICINE

## 2024-01-19 PROCEDURE — C1894 INTRO/SHEATH, NON-LASER: HCPCS | Performed by: INTERNAL MEDICINE

## 2024-01-19 PROCEDURE — 85027 COMPLETE CBC AUTOMATED: CPT | Performed by: INTERNAL MEDICINE

## 2024-01-19 PROCEDURE — 93458 L HRT ARTERY/VENTRICLE ANGIO: CPT | Performed by: INTERNAL MEDICINE

## 2024-01-19 PROCEDURE — C8929 TTE W OR WO FOL WCON,DOPPLER: HCPCS

## 2024-01-19 PROCEDURE — 258N000003 HC RX IP 258 OP 636: Performed by: INTERNAL MEDICINE

## 2024-01-19 PROCEDURE — 93571 IV DOP VEL&/PRESS C FLO 1ST: CPT | Mod: 26 | Performed by: INTERNAL MEDICINE

## 2024-01-19 PROCEDURE — 272N000001 HC OR GENERAL SUPPLY STERILE: Performed by: INTERNAL MEDICINE

## 2024-01-19 PROCEDURE — 93458 L HRT ARTERY/VENTRICLE ANGIO: CPT | Mod: 26 | Performed by: INTERNAL MEDICINE

## 2024-01-19 PROCEDURE — 85610 PROTHROMBIN TIME: CPT | Performed by: INTERNAL MEDICINE

## 2024-01-19 PROCEDURE — 250N000009 HC RX 250: Performed by: INTERNAL MEDICINE

## 2024-01-19 PROCEDURE — C1769 GUIDE WIRE: HCPCS | Performed by: INTERNAL MEDICINE

## 2024-01-19 PROCEDURE — 999N000208 ECHOCARDIOGRAM COMPLETE

## 2024-01-19 PROCEDURE — 85730 THROMBOPLASTIN TIME PARTIAL: CPT | Performed by: INTERNAL MEDICINE

## 2024-01-19 RX ORDER — NALOXONE HYDROCHLORIDE 0.4 MG/ML
0.4 INJECTION, SOLUTION INTRAMUSCULAR; INTRAVENOUS; SUBCUTANEOUS
Status: DISCONTINUED | OUTPATIENT
Start: 2024-01-19 | End: 2024-01-19 | Stop reason: HOSPADM

## 2024-01-19 RX ORDER — FLUMAZENIL 0.1 MG/ML
0.2 INJECTION, SOLUTION INTRAVENOUS
Status: DISCONTINUED | OUTPATIENT
Start: 2024-01-19 | End: 2024-01-19 | Stop reason: HOSPADM

## 2024-01-19 RX ORDER — FENTANYL CITRATE 50 UG/ML
INJECTION, SOLUTION INTRAMUSCULAR; INTRAVENOUS
Status: DISCONTINUED | OUTPATIENT
Start: 2024-01-19 | End: 2024-01-19 | Stop reason: HOSPADM

## 2024-01-19 RX ORDER — ASPIRIN 325 MG
325 TABLET ORAL ONCE
Status: COMPLETED | OUTPATIENT
Start: 2024-01-19 | End: 2024-01-19

## 2024-01-19 RX ORDER — FENTANYL CITRATE 50 UG/ML
25 INJECTION, SOLUTION INTRAMUSCULAR; INTRAVENOUS
Status: DISCONTINUED | OUTPATIENT
Start: 2024-01-19 | End: 2024-01-19 | Stop reason: HOSPADM

## 2024-01-19 RX ORDER — IOPAMIDOL 755 MG/ML
INJECTION, SOLUTION INTRAVASCULAR
Status: DISCONTINUED | OUTPATIENT
Start: 2024-01-19 | End: 2024-01-19 | Stop reason: HOSPADM

## 2024-01-19 RX ORDER — ACETAMINOPHEN 325 MG/1
650 TABLET ORAL EVERY 4 HOURS PRN
Status: DISCONTINUED | OUTPATIENT
Start: 2024-01-19 | End: 2024-01-19 | Stop reason: HOSPADM

## 2024-01-19 RX ORDER — NALOXONE HYDROCHLORIDE 0.4 MG/ML
0.2 INJECTION, SOLUTION INTRAMUSCULAR; INTRAVENOUS; SUBCUTANEOUS
Status: DISCONTINUED | OUTPATIENT
Start: 2024-01-19 | End: 2024-01-19 | Stop reason: HOSPADM

## 2024-01-19 RX ORDER — ATROPINE SULFATE 0.1 MG/ML
0.5 INJECTION INTRAVENOUS
Status: DISCONTINUED | OUTPATIENT
Start: 2024-01-19 | End: 2024-01-19 | Stop reason: HOSPADM

## 2024-01-19 RX ORDER — ASPIRIN 81 MG/1
243 TABLET, CHEWABLE ORAL ONCE
Status: COMPLETED | OUTPATIENT
Start: 2024-01-19 | End: 2024-01-19

## 2024-01-19 RX ORDER — POTASSIUM CHLORIDE 1500 MG/1
20 TABLET, EXTENDED RELEASE ORAL
Status: DISCONTINUED | OUTPATIENT
Start: 2024-01-19 | End: 2024-01-19 | Stop reason: HOSPADM

## 2024-01-19 RX ORDER — LIDOCAINE 40 MG/G
CREAM TOPICAL
Status: DISCONTINUED | OUTPATIENT
Start: 2024-01-19 | End: 2024-01-19 | Stop reason: HOSPADM

## 2024-01-19 RX ORDER — NITROGLYCERIN 5 MG/ML
VIAL (ML) INTRAVENOUS
Status: DISCONTINUED | OUTPATIENT
Start: 2024-01-19 | End: 2024-01-19 | Stop reason: HOSPADM

## 2024-01-19 RX ORDER — HEPARIN SODIUM 1000 [USP'U]/ML
INJECTION, SOLUTION INTRAVENOUS; SUBCUTANEOUS
Status: DISCONTINUED | OUTPATIENT
Start: 2024-01-19 | End: 2024-01-19 | Stop reason: HOSPADM

## 2024-01-19 RX ORDER — VERAPAMIL HYDROCHLORIDE 2.5 MG/ML
INJECTION, SOLUTION INTRAVENOUS
Status: DISCONTINUED | OUTPATIENT
Start: 2024-01-19 | End: 2024-01-19 | Stop reason: HOSPADM

## 2024-01-19 RX ORDER — SODIUM CHLORIDE 9 MG/ML
INJECTION, SOLUTION INTRAVENOUS CONTINUOUS
Status: DISCONTINUED | OUTPATIENT
Start: 2024-01-19 | End: 2024-01-19 | Stop reason: HOSPADM

## 2024-01-19 RX ADMIN — HUMAN ALBUMIN MICROSPHERES AND PERFLUTREN 6 ML: 10; .22 INJECTION, SOLUTION INTRAVENOUS at 13:43

## 2024-01-19 RX ADMIN — SODIUM CHLORIDE: 9 INJECTION, SOLUTION INTRAVENOUS at 10:30

## 2024-01-19 ASSESSMENT — ACTIVITIES OF DAILY LIVING (ADL)
ADLS_ACUITY_SCORE: 35

## 2024-01-19 NOTE — PROGRESS NOTES
Patient called nurse to look at radial site, check if bleeding as we had just started initiation of Vasc Band removal.  There was some blood present, but no pulsatile blood from puncture site.  Appears that balloon was not fully inflated (wilted) and band not in good position to assess and monitor therefore band removed.  Right radial access site c/d/I No bleeding No hematoma.  Dressing applied, arm elevated on pillow for easy observation.  Dr Ho updated about situation, band removed about 20 minutes earlier than intended but no issues with site.  Dr Ho OK to let patient go home per protocol if site remains unchanged.  Emily Johnston, RN

## 2024-01-19 NOTE — DISCHARGE INSTRUCTIONS
Coronary Angiogram: What to Expect at Home  Your Recovery     A coronary angiogram is a test to examine the large blood vessels of your heart (coronary arteries). The doctor inserted a thin, flexible tube (catheter) into a blood vessel in your groin or wrist.  Your groin or wrist may have a bruise and feel sore for a few days after the procedure. You can do light activities around the house. But do not do anything strenuous until your doctor says it is okay. This may be for several days.  This care sheet gives you a general idea about how long it will take for you to recover. But each person recovers at a different pace. Follow the steps below to feel better as quickly as possible.  How can you care for yourself at home?  Activity    If the doctor gave you a sedative:  For 24 hours, don't do anything that requires attention to detail, such as going to work, making important decisions, or signing any legal documents. It takes time for the medicine's effects to completely wear off.  For your safety, do not drive or operate any machinery that could be dangerous. Wait until the medicine wears off and you can think clearly and react easily.     Do not do strenuous exercise and do not lift, pull, or push anything heavy until your doctor says it is okay. This may be for several days. You can walk around the house and do light activity, such as cooking.     If the catheter was placed in your groin, try not to walk up stairs for the first couple of days.     If the catheter was placed in your wrist, do not bend your wrist deeply for the first couple of days. Be careful using your hand to get into and out of a chair or bed.     Get regular exercise. Walking may be a good choice. Try for at least 30 minutes on most days of the week.   Diet    Drink plenty of fluids to help your body flush out the dye. If you have kidney, heart, or liver disease and have to limit fluids, talk with your doctor before you increase the amount of  fluids you drink.     Keep eating a heart-healthy diet that has lots of fruits, vegetables, and whole grains. If you have not been eating this way, talk to your doctor. You also may want to talk to a dietitian. This expert can help you to learn about healthy foods and plan meals.   Medicines    Your doctor will tell you if and when you can restart your medicines. You will also be given instructions about taking any new medicines.     If you stopped taking aspirin or some other blood thinner, your doctor will tell you when to start taking it again.     Your doctor may prescribe a blood-thinning medicine like aspirin or clopidogrel (Plavix). It is very important that you take these medicines exactly as directed in order to keep the coronary artery open and reduce your risk of a heart attack. Be safe with medicines. Call your doctor if you think you are having a problem with your medicine.   Care of the catheter site    For 1 or 2 days, keep a bandage over the spot where the catheter was inserted. The bandage probably will fall off in this time.     Put ice or a cold pack on the area for 10 to 20 minutes at a time to help with soreness or swelling. Put a thin cloth between the ice and your skin.     You may shower 24 to 48 hours after the procedure, if your doctor okays it. Pat the incision dry.     Do not soak the catheter site until it is healed. Don't take a bath for 1 week, or until your doctor tells you it is okay.     Watch for bleeding from the site. A small amount of blood (up to the size of a quarter) on the bandage can be normal.     If you are bleeding, lie down and press on the area for 15 minutes to try to make it stop. If the bleeding does not stop, call your doctor or seek immediate medical care.   Follow-up care is a key part of your treatment and safety. Be sure to make and go to all appointments, and call your doctor if you are having problems. It's also a good idea to know your test results and keep  "a list of the medicines you take.  When should you call for help?   Call 911 anytime you think you may need emergency care. For example, call if:    You passed out (lost consciousness).     You have severe trouble breathing.     You have sudden chest pain and shortness of breath, or you cough up blood.     You have symptoms of a heart attack. These may include:  Chest pain or pressure, or a strange feeling in the chest.  Sweating.  Shortness of breath.  Nausea or vomiting.  Pain, pressure, or a strange feeling in the back, neck, jaw, or upper belly, or in one or both shoulders or arms.  Lightheadedness or sudden weakness.  A fast or irregular heartbeat.     After you call 911, the  may tell you to chew 1 adult-strength or 2 to 4 low-dose aspirin. Wait for an ambulance. Do not try to drive yourself.     You have been diagnosed with angina, and you have symptoms that do not go away with rest or are not getting better within 5 minutes after you take a dose of nitroglycerin.   Call your doctor now or seek immediate medical care if:    You are bleeding from the area where the catheter was put in your artery.     You have a fast-growing, painful lump at the catheter site.     You have signs of infection, such as:  Increased pain, swelling, warmth, or redness.  Red streaks leading from the catheter site.  Pus draining from the catheter site.  A fever.     Your leg or hand is painful, looks blue, or feels cold, numb, or tingly.   Watch closely for changes in your health, and be sure to contact your doctor if you have any problems.  Where can you learn more?  Go to https://www.[employer].net/patiented  Enter D192 in the search box to learn more about \"Coronary Angiogram: What to Expect at Home.\"  Current as of: June 25, 2023               Content Version: 13.8    8422-4573 [employer].   Care instructions adapted under license by your healthcare professional. If you have questions about a medical " condition or this instruction, always ask your healthcare professional. [employer] disclaims any warranty or liability for your use of this information.

## 2024-01-19 NOTE — PRE-PROCEDURE
GENERAL PRE-PROCEDURE:   Procedure:  Coronary angiogram possible PCI  Date/Time:  1/19/2024 12:01 PM    Written consent obtained?: Yes    Risks and benefits: Risks, benefits and alternatives were discussed    Consent given by:  Patient  Patient states understanding of procedure being performed: Yes    Patient's understanding of procedure matches consent: Yes    Procedure consent matches procedure scheduled: Yes    Expected level of sedation:  Moderate  Appropriately NPO:  Yes  ASA Class:  4  Mallampati  :  Grade 1- soft palate, uvula, tonsillar pillars, and posterior pharyngeal wall visible  Lungs:  Lungs clear with good breath sounds bilaterally  Heart:  Normal heart sounds and rate  History & Physical reviewed:  History and physical reviewed and no updates needed  Statement of review:  I have reviewed the lab findings, diagnostic data, medications, and the plan for sedation

## 2024-01-19 NOTE — PROGRESS NOTES
Patient discharged home accompanied by friend, Severiano.  Discharge instructions reviewed and patient stated understanding.  Right radial access site remains c/d/I  No bleeding, no bruising, no hematoma.  Emily Johnston RN

## 2024-01-22 ENCOUNTER — TELEPHONE (OUTPATIENT)
Dept: CARDIOLOGY | Facility: CLINIC | Age: 64
End: 2024-01-22
Payer: COMMERCIAL

## 2024-01-22 NOTE — TELEPHONE ENCOUNTER
Patient was admitted to [facility] on 1/19/24 with history of stent to proximal RCA in 2017 and proximal LAD in 2018 presented to clinic due to concern regarding an episode of intense chest pain which lasted for two hours in Dec 2023. He was referred for coronary angiogram with possible PCI.     1/19/24: Coronary angiogram via RRA showed:      Prox RCA lesion is 10% stenosed.    Prox RCA to Mid RCA lesion is 50% stenosed.    Left ventricular filling pressures are normal.     Moderate stenosis of the midRCA which is not hemodynamically significant with an iFR of 0.97.  Patent proximal LAD and proximal RCA stents.  Normal LVEDP 8 mmHg.     Continued medical management. No medication changes made.    Called patient to discuss any post hospital d/c questions he may have and confirm f/u appts.     Patient denied any SOB, chest pain or lightheadedness.     RRA cardiac cath site is without bleeding, swelling, redness or signs of infection.     RN confirmed with patient that he is scheduled for an OV on 2/16/24 at 0855 with GURPREET Echo Nunez at our [place] Office.    Patient advised to call clinic with any cardiac related questions or concerns prior to this gurpreet't. Patient verbalized understanding and agreed with plan. NEY Arroyo RN.

## 2024-02-16 ENCOUNTER — OFFICE VISIT (OUTPATIENT)
Dept: CARDIOLOGY | Facility: CLINIC | Age: 64
End: 2024-02-16
Payer: COMMERCIAL

## 2024-02-16 VITALS
WEIGHT: 303 LBS | BODY MASS INDEX: 47.56 KG/M2 | HEIGHT: 67 IN | OXYGEN SATURATION: 95 % | HEART RATE: 65 BPM | SYSTOLIC BLOOD PRESSURE: 122 MMHG | DIASTOLIC BLOOD PRESSURE: 70 MMHG

## 2024-02-16 DIAGNOSIS — Z98.61 POSTSURGICAL PERCUTANEOUS TRANSLUMINAL CORONARY ANGIOPLASTY STATUS: ICD-10-CM

## 2024-02-16 DIAGNOSIS — E78.2 MIXED HYPERLIPIDEMIA: ICD-10-CM

## 2024-02-16 DIAGNOSIS — I21.4 NSTEMI (NON-ST ELEVATED MYOCARDIAL INFARCTION) (H): ICD-10-CM

## 2024-02-16 PROCEDURE — 99214 OFFICE O/P EST MOD 30 MIN: CPT | Performed by: PHYSICIAN ASSISTANT

## 2024-02-16 RX ORDER — NITROGLYCERIN 0.4 MG/1
TABLET SUBLINGUAL
Qty: 25 TABLET | Refills: 1 | Status: SHIPPED | OUTPATIENT
Start: 2024-02-16

## 2024-02-16 NOTE — LETTER
2024    [place]  [address]  [facility] [address]    RE: Darryl Uribe       Dear Colleague,     I had the [facility] the [facility].      CARDIOLOGY CLINIC PROGRESS NOTE    DOS: 2024      Darryl Uribe  : 1960, 63 year old  MRN: [id number]      Primary cardiologist: Dr. Amaya      History: I am meeting Darryl Uribe today for follow up.     Darryl Uribe is a 63 year old male with a past medical history notable for hypertension, hyperlipidemia, and a very strong family history of coronary artery disease with both his mother and father having heart attacks in their 60s.  He is a lifelong nonsmoker. He has morbid obesity and known coronary artery disease with stenting of his right coronary in 2016 after a non-ST segment elevation myocardial infarction. He received a second stent in his left anterior descending artery in 2018 again for an acute coronary syndrome.  He does have colostomy bag in place as he has had colectomy for ulcerative colitis     He saw Dr. Harris 1/3/24 for worsening chest discomfort.   Cardiac catheterization was scheduled.     He underwent cardiac cath 24.   Conclusion    Prox RCA lesion is 10% stenosed.    Prox RCA to Mid RCA lesion is 50% stenosed.    Left ventricular filling pressures are normal.     Moderate stenosis of the midRCA which is not hemodynamically significant with an iFR of 0.97.  Patent proximal LAD and proximal RCA stents.  Normal LVEDP 8mmHg.          Plan   Follow bedrest per protocol   Continued medical management and lifestyle modifications for cardiovascular risk factor optimizations.   Arterial sheath removed from radial artery with TR band placement.    Continue medical management. Evaluate for noncardiac causes of chest discomfort if clinically appropriate.         Echo 24:  Interpretation Summary  Left ventricular systolic function is normal. The visual ejection fraction is  60-65%.  The right ventricle is mildly dilated.  The right ventricular systolic function is normal.  No significant valvular abnormality. Mild aortic sclerosis.  Mildly dilated aortic root, 4.3cm. Borderline dilation of ascending aorta 4.1cm.     No significant change in comparison to the echo from 06/15/2017.      He presents today for follow up.   No recurrence of chest pain.   Radial site is ok.    Mild LH when he stands quickly. No passing out.   GOMEZ.   BP controlled.   Last FLP was in 2023 at [facility] through [facility]. LDL was a little elevated at 80.         ROS:  Skin:        Eyes:       ENT:       Respiratory:  Positive for shortness of breath;dyspnea on exertion;sleep apnea;CPAP  Cardiovascular:    Positive for;dizziness  Gastroenterology:      Genitourinary:       Musculoskeletal:       Neurologic:       Psychiatric:       Heme/Lymph/Imm:       Endocrine:         PAST MEDICAL HISTORY:  Past Medical History:   Diagnosis Date    Anxiety     CAD (coronary artery disease)     cardiac cath 7/2017: ADELFO to LAD; NonSTEMI + cath with ADELFO to RCA 6/2017    Hyperlipemia     Hypertension     Ulcerative colitis (H)        PAST SURGICAL HISTORY:  Past Surgical History:   Procedure Laterality Date    CV CORONARY ANGIOGRAM N/A 1/19/2024    Procedure: Coronary Angiogram;  Surgeon: Yadira Ho MD;  Location: [facility]    CV INSTANTANEOUS WAVE-FREE RATIO N/A 1/19/2024    Procedure: Instantaneous Wave-Free Ratio;  Surgeon: Yadira oH MD;  Location:  HEART CARDIAC CATH LAB    CV LEFT HEART CATH N/A 1/19/2024    Procedure: Left Heart Catheterization;  Surgeon: Yadira Ho MD;  Location: [facility]ARD[facility] C[facility]    OTHER SURGICAL HISTORY  06/15/2017    cardiac cath: ADELFO to RCA    OTHER SURGICAL HISTORY  07/27/2018    cardiac cath: ADELFO to LAD       SOCIAL HISTORY:  Social History     Socioeconomic History    Marital status:      Spouse name: None    Number of children: None    Years of  "education: None    Highest education level: None   Tobacco Use    Smoking status: Never    Smokeless tobacco: Never   Substance and Sexual Activity    Alcohol use: No     Comment: 2 drinks per year if that    Drug use: No    Sexual activity: Never   Other Topics Concern    Parent/sibling w/ CABG, MI or angioplasty before 65F 55M? Yes       FAMILY HISTORY:  Family History   Problem Relation Age of Onset    Myocardial Infarction Mother        MEDS: ASPIRIN PO, Take 81 mg by mouth every morning   escitalopram (LEXAPRO) 20 MG tablet, Take 1 tablet by mouth every morning  isosorbide mononitrate (IMDUR) 30 MG 24 hr tablet, Take 1 tablet (30 mg) by mouth daily  lisinopril (ZESTRIL) 40 MG tablet, Take 1 tablet (40 mg) by mouth At Bedtime  metoprolol tartrate (LOPRESSOR) 25 MG tablet, Take 1 tablet (25 mg) by mouth 2 times daily  Multiple Vitamin (ONE-A-DAY ESSENTIAL) TABS, Take 1 tablet by mouth daily  omeprazole (PRILOSEC) 20 MG CR capsule, Take 20 mg by mouth 2 times daily  rosuvastatin (CRESTOR) 40 MG tablet, Take 1 tablet (40 mg) by mouth daily  traZODone (DESYREL) 50 MG tablet, Take 1 tablet by mouth as needed    No current facility-administered medications on file prior to visit.      ALLERGIES: No Known Allergies    PHYSICAL EXAM:  Vitals: /70 (BP Location: Right arm, Patient Position: Sitting, Cuff Size: Adult Large)   Pulse 65   Ht 1.702 m (5' 7\")   Wt 137.4 kg (303 lb)   SpO2 95%   BMI 47.46 kg/m    Constitutional:  cooperative, alert and oriented, well developed, well nourished, in no acute distress obese      Skin:  warm and dry to the touch, no apparent skin lesions or masses noted        Head:  normocephalic, no masses or lesions        Eyes:  conjunctivae and lids unremarkable;pupils equal and round;sclera white        ENT:  no pallor or cyanosis        Neck:      difficult JVP assessment    Respiratory:  normal breath sounds, clear to auscultation, normal A-P diameter, normal symmetry, normal " respiratory excursion, no use of accessory muscles        Cardiac: regular rhythm;normal S1 and S2;no murmurs, gallops or rubs detected                  GI:  abdomen soft;BS normoactive obese      Vascular:       right radial artery;1+                                Extremities and Musculoskeletal:  no edema;no spinal abnormalities noted;normal muscle strength and tone        Neurological:  no gross motor deficits;affect appropriate              LABS/DATA:  I reviewed the following:  Cardiac cath 1/19/24:  Conclusion    Prox RCA lesion is 10% stenosed.    Prox RCA to Mid RCA lesion is 50% stenosed.    Left ventricular filling pressures are normal.     Moderate stenosis of the midRCA which is not hemodynamically significant with an iFR of 0.97.  Patent proximal LAD and proximal RCA stents.  Normal LVEDP 8mmHg.          Plan   Follow bedrest per protocol   Continued medical management and lifestyle modifications for cardiovascular risk factor optimizations.   Arterial sheath removed from radial artery with TR band placement.    Continue medical management. Evaluate for noncardiac causes of chest discomfort if clinically appropriate.         Echo 1/19/24:  Interpretation Summary  Left ventricular systolic function is normal. The visual ejection fraction is  60-65%.  The right ventricle is mildly dilated.  The right ventricular systolic function is normal.  No significant valvular abnormality. Mild aortic sclerosis.  Mildly dilated aortic root, 4.3cm. Borderline dilation of ascending aorta  4.1cm.     No significant change in comparison to the echo from 06/15/2017.        FLP 2/2023 through [facility]: LDL 80,             ASSESSMENT/PLAN:  CAD  HTN  Dyslipidemia  Morbid obesity      - history of stent to proximal RCA in 2017 and proximal LAD in 2018   - Seen for sxs consistent with [place]  - Underwent cardiac cath 1/19/24 that showed prior stents are patent. Prox RCA to Mid RCA lesion is 50% stenosed. Moderate stenosis  of the midRCA which is not hemodynamically significant with an iFR of 0.97.  LVEDP was normal at 8 mmHg  - no further episodes of chest discomfort  - he has some GOMEZ but BP controlled and LVEDP normal. Suspect it is more deconditioning and obesity related  - Continue ASA, statin, BB, Imdur  - BP controlled, continue regimen  - Last FLP 2/2023 through [facility] with LDL 80 on Crestor 40 mg. Due for repeat in March with PCP he reports.  If still above 50-70, would add Zetia 10 mg  - Weight loss, increased activity   - I did give him a new Rx for SL nitroglycerin prn      Follow up:  Dr. Amaya in 6 months, sooner if concerns      Echo Nunez PA-C  Thank you for allowing me to participate in the care of your patient.      Sincerely,     Echo Nunez PA-C     [facility]  cc:   No referring provider defined for this encounter.

## 2024-02-16 NOTE — PROGRESS NOTES
CARDIOLOGY CLINIC PROGRESS NOTE    DOS: 2024      Darryl Uribe  : 1960, 63 year old  MRN: [id number]      Primary cardiologist: Dr. Amaya      History: I am meeting Darryl Uribe today for follow up.     Darryl Uribe is a 63 year old male with a past medical history notable for hypertension, hyperlipidemia, and a very strong family history of coronary artery disease with both his mother and father having heart attacks in their 60s.  He is a lifelong nonsmoker. He has morbid obesity and known coronary artery disease with stenting of his right coronary in 2016 after a non-ST segment elevation myocardial infarction. He received a second stent in his left anterior descending artery in 2018 again for an acute coronary syndrome.  He does have colostomy bag in place as he has had colectomy for ulcerative colitis     He saw Dr. Harris 1/3/24 for worsening chest discomfort.   Cardiac catheterization was scheduled.     He underwent cardiac cath 24.   Conclusion    Prox RCA lesion is 10% stenosed.    Prox RCA to Mid RCA lesion is 50% stenosed.    Left ventricular filling pressures are normal.     Moderate stenosis of the midRCA which is not hemodynamically significant with an iFR of 0.97.  Patent proximal LAD and proximal RCA stents.  Normal LVEDP 8mmHg.          Plan   Follow bedrest per protocol   Continued medical management and lifestyle modifications for cardiovascular risk factor optimizations.   Arterial sheath removed from radial artery with TR band placement.    Continue medical management. Evaluate for noncardiac causes of chest discomfort if clinically appropriate.         Echo 24:  Interpretation Summary  Left ventricular systolic function is normal. The visual ejection fraction is 60-65%.  The right ventricle is mildly dilated.  The right ventricular systolic function is normal.  No significant valvular abnormality. Mild aortic sclerosis.  Mildly dilated aortic root, 4.3cm. Borderline  dilation of ascending aorta 4.1cm.     No significant change in comparison to the echo from 06/15/2017.      He presents today for follow up.   No recurrence of chest pain.   Radial site is ok.    Mild LH when he stands quickly. No passing out.   GOMEZ.   BP controlled.   Last FLP was in 2023 at PCP through [facility]. LDL was a little elevated at 80.         ROS:  Skin:        Eyes:       ENT:       Respiratory:  Positive for shortness of breath;dyspnea on exertion;sleep apnea;CPAP  Cardiovascular:    Positive for;dizziness  Gastroenterology:      Genitourinary:       Musculoskeletal:       Neurologic:       Psychiatric:       Heme/Lymph/Imm:       Endocrine:         PAST MEDICAL HISTORY:  Past Medical History:   Diagnosis Date    Anxiety     CAD (coronary artery disease)     cardiac cath 7/2017: ADELFO to LAD; NonSTEMI + cath with ADELFO to RCA 6/2017    Hyperlipemia     Hypertension     Ulcerative colitis (H)        PAST SURGICAL HISTORY:  Past Surgical History:   Procedure Laterality Date    CV CORONARY ANGIOGRAM N/A 1/19/2024    Procedure: Coronary Angiogram;  Surgeon: Yadira Ho MD;  Location: [facility]    CV INSTANTANEOUS WAVE-FREE RATIO N/A 1/19/2024    Procedure: Instantaneous Wave-Free Ratio;  Surgeon: Yadira Ho MD;  Location:  [facility]    CV LEFT HEART CATH N/A 1/19/2024    Procedure: Left Heart Catheterization;  Surgeon: Yadira Ho MD;  Location: [facility]    OTHER SURGICAL HISTORY  06/15/2017    cardiac cath: ADELFO to RCA    OTHER SURGICAL HISTORY  07/27/2018    cardiac cath: ADELFO to LAD       SOCIAL HISTORY:  Social History     Socioeconomic History    Marital status:      Spouse name: None    Number of children: None    Years of education: None    Highest education level: None   Tobacco Use    Smoking status: Never    Smokeless tobacco: Never   Substance and Sexual Activity    Alcohol use: No     Comment: 2 drinks per year if that    Drug  "use: No    Sexual activity: Never   Other Topics Concern    Parent/sibling w/ CABG, MI or angioplasty before 65F 55M? Yes       FAMILY HISTORY:  Family History   Problem Relation Age of Onset    Myocardial Infarction Mother        MEDS: ASPIRIN PO, Take 81 mg by mouth every morning   escitalopram (LEXAPRO) 20 MG tablet, Take 1 tablet by mouth every morning  isosorbide mononitrate (IMDUR) 30 MG 24 hr tablet, Take 1 tablet (30 mg) by mouth daily  lisinopril (ZESTRIL) 40 MG tablet, Take 1 tablet (40 mg) by mouth At Bedtime  metoprolol tartrate (LOPRESSOR) 25 MG tablet, Take 1 tablet (25 mg) by mouth 2 times daily  Multiple Vitamin (ONE-A-DAY ESSENTIAL) TABS, Take 1 tablet by mouth daily  omeprazole (PRILOSEC) 20 MG CR capsule, Take 20 mg by mouth 2 times daily  rosuvastatin (CRESTOR) 40 MG tablet, Take 1 tablet (40 mg) by mouth daily  traZODone (DESYREL) 50 MG tablet, Take 1 tablet by mouth as needed    No current facility-administered medications on file prior to visit.      ALLERGIES: No Known Allergies    PHYSICAL EXAM:  Vitals: /70 (BP Location: Right arm, Patient Position: Sitting, Cuff Size: Adult Large)   Pulse 65   Ht 1.702 m (5' 7\")   Wt 137.4 kg (303 lb)   SpO2 95%   BMI 47.46 kg/m    Constitutional:  cooperative, alert and oriented, well developed, well nourished, in no acute distress obese      Skin:  warm and dry to the touch, no apparent skin lesions or masses noted        Head:  normocephalic, no masses or lesions        Eyes:  conjunctivae and lids unremarkable;pupils equal and round;sclera white        ENT:  no pallor or cyanosis        Neck:      difficult JVP assessment    Respiratory:  normal breath sounds, clear to auscultation, normal A-P diameter, normal symmetry, normal respiratory excursion, no use of accessory muscles        Cardiac: regular rhythm;normal S1 and S2;no murmurs, gallops or rubs detected                  GI:  abdomen soft;BS normoactive obese      Vascular:       right " radial artery;1+                                Extremities and Musculoskeletal:  no edema;no spinal abnormalities noted;normal muscle strength and tone        Neurological:  no gross motor deficits;affect appropriate              LABS/DATA:  I reviewed the following:  Cardiac cath 1/19/24:  Conclusion    Prox RCA lesion is 10% stenosed.    Prox RCA to Mid RCA lesion is 50% stenosed.    Left ventricular filling pressures are normal.     Moderate stenosis of the midRCA which is not hemodynamically significant with an iFR of 0.97.  Patent proximal LAD and proximal RCA stents.  Normal LVEDP 8mmHg.          Plan   Follow bedrest per protocol   Continued medical management and lifestyle modifications for cardiovascular risk factor optimizations.   Arterial sheath removed from radial artery with TR band placement.    Continue medical management. Evaluate for noncardiac causes of chest discomfort if clinically appropriate.         Echo 1/19/24:  Interpretation Summary  Left ventricular systolic function is normal. The visual ejection fraction is  60-65%.  The right ventricle is mildly dilated.  The right ventricular systolic function is normal.  No significant valvular abnormality. Mild aortic sclerosis.  Mildly dilated aortic root, 4.3cm. Borderline dilation of ascending aorta  4.1cm.     No significant change in comparison to the echo from 06/15/2017.        FLP 2/2023 through [facility]: LDL 80,             ASSESSMENT/PLAN:  CAD  HTN  Dyslipidemia  Morbid obesity      - history of stent to proximal RCA in 2017 and proximal LAD in 2018   - Seen for sxs consistent with [place]  - Underwent cardiac cath 1/19/24 that showed prior stents are patent. Prox RCA to Mid RCA lesion is 50% stenosed. Moderate stenosis of the midRCA which is not hemodynamically significant with an iFR of 0.97.  LVEDP was normal at 8 mmHg  - no further episodes of chest discomfort  - he has some GOMEZ but BP controlled and LVEDP normal. Suspect it is more  deconditioning and obesity related  - Continue ASA, statin, BB, Imdur  - BP controlled, continue regimen  - Last FLP 2/2023 through [facility] with LDL 80 on Crestor 40 mg. Due for repeat in March with PCP he reports.  If still above 50-70, would add Zetia 10 mg  - Weight loss, increased activity   - I did give him a new Rx for SL nitroglycerin prn      Follow up:  Dr. Amaya in 6 months, sooner if concerns      NARENDRA VallejoC

## 2024-06-01 ENCOUNTER — HEALTH MAINTENANCE LETTER (OUTPATIENT)
Age: 64
End: 2024-06-01

## 2025-04-12 ENCOUNTER — HEALTH MAINTENANCE LETTER (OUTPATIENT)
Age: 65
End: 2025-04-12

## (undated) DEVICE — GUIDEWIRE J TIP 1.5MM 210CM

## (undated) DEVICE — CATH LAUNCHER 6FR JR 4.0 LA6JR40

## (undated) DEVICE — CATH ANGIO INFINITI JR4 4FRX100CM 538421

## (undated) DEVICE — GW VASC OMNIWIRE J L185CM PRESSURE 89185J

## (undated) DEVICE — KIT HAND CONTROL ANGIOTOUCH ACIST 65CM AT-P65

## (undated) DEVICE — HEMOSTAT COMPRESSION VASC XLONG 29CM 3529

## (undated) DEVICE — DEFIB PRO-PADZ LVP LQD GEL ADULT 8900-2105-01

## (undated) DEVICE — MANIFOLD KIT ANGIO AUTOMATED 014613

## (undated) DEVICE — INTRO GLIDESHEATH SLENDER 6FR 10X45CM 60-1060

## (undated) DEVICE — KIT LG BORE TOUHY ACCESS PLUS MAP152

## (undated) DEVICE — CATH ANGIO 100CM 5FR INFNT JL3.5 CRV COR FEM 534518T

## (undated) RX ORDER — LIDOCAINE HYDROCHLORIDE 10 MG/ML
INJECTION, SOLUTION EPIDURAL; INFILTRATION; INTRACAUDAL; PERINEURAL
Status: DISPENSED
Start: 2018-07-26

## (undated) RX ORDER — FENTANYL CITRATE 50 UG/ML
INJECTION, SOLUTION INTRAMUSCULAR; INTRAVENOUS
Status: DISPENSED
Start: 2018-07-27

## (undated) RX ORDER — HEPARIN SODIUM 1000 [USP'U]/ML
INJECTION, SOLUTION INTRAVENOUS; SUBCUTANEOUS
Status: DISPENSED
Start: 2018-07-27

## (undated) RX ORDER — FENTANYL CITRATE 50 UG/ML
INJECTION, SOLUTION INTRAMUSCULAR; INTRAVENOUS
Status: DISPENSED
Start: 2024-01-19

## (undated) RX ORDER — LIDOCAINE HYDROCHLORIDE 10 MG/ML
INJECTION, SOLUTION EPIDURAL; INFILTRATION; INTRACAUDAL; PERINEURAL
Status: DISPENSED
Start: 2018-07-27

## (undated) RX ORDER — NITROGLYCERIN 5 MG/ML
VIAL (ML) INTRAVENOUS
Status: DISPENSED
Start: 2018-07-27

## (undated) RX ORDER — CLOPIDOGREL 300 MG/1
TABLET, FILM COATED ORAL
Status: DISPENSED
Start: 2018-07-27

## (undated) RX ORDER — FENTANYL CITRATE 50 UG/ML
INJECTION, SOLUTION INTRAMUSCULAR; INTRAVENOUS
Status: DISPENSED
Start: 2018-07-26

## (undated) RX ORDER — VERAPAMIL HYDROCHLORIDE 2.5 MG/ML
INJECTION, SOLUTION INTRAVENOUS
Status: DISPENSED
Start: 2024-01-19

## (undated) RX ORDER — HEPARIN SODIUM 1000 [USP'U]/ML
INJECTION, SOLUTION INTRAVENOUS; SUBCUTANEOUS
Status: DISPENSED
Start: 2024-01-19

## (undated) RX ORDER — NITROGLYCERIN 5 MG/ML
VIAL (ML) INTRAVENOUS
Status: DISPENSED
Start: 2024-01-19

## (undated) RX ORDER — HEPARIN SODIUM 1000 [USP'U]/ML
INJECTION, SOLUTION INTRAVENOUS; SUBCUTANEOUS
Status: DISPENSED
Start: 2018-07-26

## (undated) RX ORDER — LIDOCAINE HYDROCHLORIDE 10 MG/ML
INJECTION, SOLUTION EPIDURAL; INFILTRATION; INTRACAUDAL; PERINEURAL
Status: DISPENSED
Start: 2024-01-19

## (undated) RX ORDER — VERAPAMIL HYDROCHLORIDE 2.5 MG/ML
INJECTION, SOLUTION INTRAVENOUS
Status: DISPENSED
Start: 2018-07-27